# Patient Record
Sex: MALE | Race: WHITE | NOT HISPANIC OR LATINO | Employment: OTHER | URBAN - METROPOLITAN AREA
[De-identification: names, ages, dates, MRNs, and addresses within clinical notes are randomized per-mention and may not be internally consistent; named-entity substitution may affect disease eponyms.]

---

## 2017-01-24 DIAGNOSIS — Z79.01 LONG TERM (CURRENT) USE OF ANTICOAGULANTS: Primary | ICD-10-CM

## 2017-01-24 RX ORDER — WARFARIN SODIUM 2.5 MG/1
TABLET ORAL
Qty: 180 TABLET | Refills: 1 | Status: SHIPPED | OUTPATIENT
Start: 2017-01-24 | End: 2017-04-06

## 2017-01-24 NOTE — TELEPHONE ENCOUNTER
Anticoagulation Monitoring INR Value INR Value INR Goal Range Therapeutic?   Latest Ref Rng 2.0 - 3.0 0.86 - 1.14     12/29/2016 3.2 (A)  2.0-3.0 SUPRA     Anticoagulation Monitoring Last Week Total Next Week Total Weekly Dose   Latest Ref Rng      12/29/2016 25 mg 25 mg      Anticoagulation Monitoring Return Date Recheck   Latest Ref Rng     12/29/2016 1/26/2017 4 WEEKS     Anticoagulation Monitoring Instructions Instructions   Latest Ref Rng     12/29/2016 5 mg on Mon, Wed, Fri; 2.5 mg all other days      Anticoagulation Monitoring INR location Visit Report   Latest Ref Rng     12/29/2016 Clinic

## 2017-01-24 NOTE — TELEPHONE ENCOUNTER
warfarin (COUMADIN) 2.5 MG tablet    Last Written Prescription Date: 1/11/16  Last Fill Qty: 180, # refills: 3  Last Office Visit with G, P or [facility] prescribing provider: 4/6/16       Date and Result of Last PT/INR:   INR      3.2   12/29/2016  INR      2.1   11/17/2016  INR     2.30   12/28/2015  INR     1.90   10/16/2014

## 2017-01-26 ENCOUNTER — ANTICOAGULATION THERAPY VISIT (OUTPATIENT)
Dept: NURSING | Facility: CLINIC | Age: 82
End: 2017-01-26
Payer: COMMERCIAL

## 2017-01-26 DIAGNOSIS — Z79.01 LONG TERM CURRENT USE OF ANTICOAGULANT THERAPY: Primary | ICD-10-CM

## 2017-01-26 DIAGNOSIS — I48.91 ATRIAL FIBRILLATION, UNSPECIFIED TYPE (H): ICD-10-CM

## 2017-01-26 LAB — INR POINT OF CARE: 2.4 (ref 2–3)

## 2017-01-26 PROCEDURE — 36416 COLLJ CAPILLARY BLOOD SPEC: CPT

## 2017-01-26 PROCEDURE — 85610 PROTHROMBIN TIME: CPT | Mod: QW

## 2017-01-26 PROCEDURE — 99207 ZZC NO CHARGE NURSE ONLY: CPT

## 2017-01-26 NOTE — MR AVS SNAPSHOT
Israel Moyer   1/26/2017 9:15 AM   Anticoagulation Therapy Visit    Description:  81 year old male   Provider:  NE ANTI EDDY   Department:  Ne Nurse           INR as of 1/26/2017     Selected INR 2.4 (1/26/2017)      Anticoagulation Summary as of 1/26/2017     INR goal 2.0-3.0   Selected INR 2.4 (1/26/2017)   Full instructions 5 mg on Mon, Wed, Fri; 2.5 mg all other days   Next INR check 3/9/2017    Indications   Long term current use of anticoagulant therapy [Z79.01]  ATRIAL FIBRILLATION [I48.91]         Your next Anticoagulation Clinic appointment(s)     Mar 09, 2017  9:15 AM   Anticoagulation Visit with NE ANTI COAG   [facility] ([facility])    [address] [place] [id number] [phone]              Contact Numbers     Please call [phone] to cancel and/or reschedule your appointment.  Please call [phone] with any problems or questions regarding your therapy          January 2017 Details    Sun Mon Tue Wed Thu Fri Sat     1               2               3               4               5               6               7                 8               9               10               11               12               13               14                 15               16               17               18               19               20               21                 22               23               24               25               26      2.5 mg   See details      27      5 mg         28      2.5 mg           29      2.5 mg         30      5 mg         31      2.5 mg              Date Details   01/26 This INR check               How to take your warfarin dose     To take:  2.5 mg Take 1 of the 2.5 mg tablets.    To take:  5 mg Take 2 of the 2.5 mg tablets.           February 2017 Details    Sun Mon Tue Wed u Fri Sat        1      5 mg         2      2.5 mg         3      5 mg         4      2.5 mg           5      2.5 mg         6       5 mg         7      2.5 mg         8      5 mg         9      2.5 mg         10      5 mg         11      2.5 mg           12      2.5 mg         13      5 mg         14      2.5 mg         15      5 mg         16      2.5 mg         17      5 mg         18      2.5 mg           19      2.5 mg         20      5 mg         21      2.5 mg         22      5 mg         23      2.5 mg         24      5 mg         25      2.5 mg           26      2.5 mg         27      5 mg         28      2.5 mg              Date Details   No additional details            How to take your warfarin dose     To take:  2.5 mg Take 1 of the 2.5 mg tablets.    To take:  5 mg Take 2 of the 2.5 mg tablets.           March 2017 Details    Sun Mon Tue Wed Thu Fri Sat        1      5 mg         2      2.5 mg         3      5 mg         4      2.5 mg           5      2.5 mg         6      5 mg         7      2.5 mg         8      5 mg         9            10               11                 12               13               14               15               16               17               18                 19               20               21               22               23               24               25                 26               27               28               29               30               31                 Date Details   No additional details    Date of next INR:  3/9/2017         How to take your warfarin dose     To take:  2.5 mg Take 1 of the 2.5 mg tablets.    To take:  5 mg Take 2 of the 2.5 mg tablets.

## 2017-01-26 NOTE — PROGRESS NOTES
ANTICOAGULATION FOLLOW-UP CLINIC VISIT    Patient Name:  Israel Moyer  Date:  1/26/2017  Contact Type:  Face to Face    SUBJECTIVE:     Patient Findings     Positives No Problem Findings           OBJECTIVE    INR PROTIME   Date Value Ref Range Status   01/26/2017 2.4 2.0 - 3.0 Final       ASSESSMENT / PLAN  INR assessment THER    Recheck INR In: 6 WEEKS    INR Location Clinic      Anticoagulation Summary as of 1/26/2017     INR goal 2.0-3.0   Selected INR 2.4 (1/26/2017)   Maintenance plan 5 mg (2.5 mg x 2) on Mon, Wed, Fri; 2.5 mg (2.5 mg x 1) all other days   Full instructions 5 mg on Mon, Wed, Fri; 2.5 mg all other days   Weekly total 25 mg   No change documented Sarah Velasquez, SIXTO   Plan last modified Jamaica Christensen RN (8/6/2015)   Next INR check 3/9/2017   Target end date Indefinite    Indications   Long term current use of anticoagulant therapy [Z79.01]  ATRIAL FIBRILLATION [I48.91]         Anticoagulation Episode Summary     INR check location [facility]    Preferred lab     Send INR reminders to [facility]    Comments             See the Encounter Report to view Anticoagulation Flowsheet and Dosing Calendar (Go to Encounters tab in chart review, and find the Anticoagulation Therapy Visit)        Sarah Velasquez, RN

## 2017-03-09 ENCOUNTER — ANTICOAGULATION THERAPY VISIT (OUTPATIENT)
Dept: NURSING | Facility: CLINIC | Age: 82
End: 2017-03-09
Payer: COMMERCIAL

## 2017-03-09 DIAGNOSIS — I48.91 ATRIAL FIBRILLATION, UNSPECIFIED TYPE (H): ICD-10-CM

## 2017-03-09 DIAGNOSIS — Z79.01 LONG TERM CURRENT USE OF ANTICOAGULANT THERAPY: ICD-10-CM

## 2017-03-09 LAB — INR POINT OF CARE: 1.9 (ref 2–3)

## 2017-03-09 PROCEDURE — 85610 PROTHROMBIN TIME: CPT | Mod: QW

## 2017-03-09 PROCEDURE — 99207 ZZC NO CHARGE NURSE ONLY: CPT

## 2017-03-09 PROCEDURE — 36416 COLLJ CAPILLARY BLOOD SPEC: CPT

## 2017-03-09 NOTE — PROGRESS NOTES
ANTICOAGULATION FOLLOW-UP CLINIC VISIT    Patient Name:  Israel Moyer  Date:  3/9/2017  Contact Type:  Face to Face    SUBJECTIVE:     Patient Findings     Positives No Problem Findings           OBJECTIVE    INR Protime   Date Value Ref Range Status   03/09/2017 1.9 (A) 2.0 - 3.0 Final       ASSESSMENT / PLAN  INR assessment THER    Recheck INR In: 4 WEEKS    INR Location Clinic      Anticoagulation Summary as of 3/9/2017     INR goal 2.0-3.0   Today's INR 1.9!   Maintenance plan 5 mg (2.5 mg x 2) on Mon, Wed, Fri; 2.5 mg (2.5 mg x 1) all other days   Full instructions 3/9: 5 mg; Otherwise 5 mg on Mon, Wed, Fri; 2.5 mg all other days   Weekly total 25 mg   Plan last modified Jamaica Christensen RN (8/6/2015)   Next INR check 4/6/2017   Target end date Indefinite    Indications   Long term current use of anticoagulant therapy [Z79.01]  ATRIAL FIBRILLATION [I48.91]         Anticoagulation Episode Summary     INR check location [facility]    Preferred lab     Send INR reminders to [facility]    Comments             See the Encounter Report to view Anticoagulation Flowsheet and Dosing Calendar (Go to Encounters tab in chart review, and find the Anticoagulation Therapy Visit)    Dosage adjustment made based on physician directed care plan.    Sarah Velasquez RN

## 2017-03-09 NOTE — MR AVS SNAPSHOT
Israel Moyer   3/9/2017 9:15 AM   Anticoagulation Therapy Visit    Description:  81 year old male   Provider:  NE ANTI COAG   Department:  Ne Nurse           INR as of 3/9/2017     Today's INR 1.9!      Anticoagulation Summary as of 3/9/2017     INR goal 2.0-3.0   Today's INR 1.9!   Full instructions 3/9: 5 mg; Otherwise 5 mg on Mon, Wed, Fri; 2.5 mg all other days   Next INR check 4/6/2017    Indications   Long term current use of anticoagulant therapy [Z79.01]  ATRIAL FIBRILLATION [I48.91]         Your next Anticoagulation Clinic appointment(s)     Apr 06, 2017  9:15 AM CDT   [facility]    [address] [place] [id number] [phone]              Contact Numbers     Please call [phone] to cancel and/or reschedule your appointment.  Please call [phone] with any problems or questions regarding your therapy          March 2017 Details    Sun Mon Tue Wed Thu Fri Sat        1               2               3               4                 5               6               7               8               9      5 mg   See details      10      5 mg         11      2.5 mg           12      2.5 mg         13      5 mg         14      2.5 mg         15      5 mg         16      2.5 mg         17      5 mg         18      2.5 mg           19      2.5 mg         20      5 mg         21      2.5 mg         22      5 mg         23      2.5 mg         24      5 mg         25      2.5 mg           26      2.5 mg         27      5 mg         28      2.5 mg         29      5 mg         30      2.5 mg         31      5 mg           Date Details   03/09 This INR check               How to take your warfarin dose     To take:  2.5 mg Take 1 of the 2.5 mg tablets.    To take:  5 mg Take 2 of the 2.5 mg tablets.           April 2017 Details    Sun Mon Tue Wed Thu Fri Sat           1      2.5 mg           2       2.5 mg         3      5 mg         4      2.5 mg         5      5 mg         6            7               8                 9               10               11               12               13               14               15                 16               17               18               19               20               21               22                 23               24               25               26               27               28               29                 30                      Date Details   No additional details    Date of next INR:  4/6/2017         How to take your warfarin dose     To take:  2.5 mg Take 1 of the 2.5 mg tablets.    To take:  5 mg Take 2 of the 2.5 mg tablets.

## 2017-04-06 ENCOUNTER — ANTICOAGULATION THERAPY VISIT (OUTPATIENT)
Dept: NURSING | Facility: CLINIC | Age: 82
End: 2017-04-06
Payer: COMMERCIAL

## 2017-04-06 ENCOUNTER — OFFICE VISIT (OUTPATIENT)
Dept: FAMILY MEDICINE | Facility: CLINIC | Age: 82
End: 2017-04-06
Payer: COMMERCIAL

## 2017-04-06 VITALS
SYSTOLIC BLOOD PRESSURE: 128 MMHG | HEIGHT: 69 IN | TEMPERATURE: 97.9 F | DIASTOLIC BLOOD PRESSURE: 72 MMHG | BODY MASS INDEX: 35.55 KG/M2 | WEIGHT: 240 LBS | HEART RATE: 72 BPM

## 2017-04-06 DIAGNOSIS — Z23 NEED FOR DIPHTHERIA-TETANUS-PERTUSSIS (TDAP) VACCINE: ICD-10-CM

## 2017-04-06 DIAGNOSIS — M1A.0790 IDIOPATHIC CHRONIC GOUT OF FOOT WITHOUT TOPHUS, UNSPECIFIED LATERALITY: ICD-10-CM

## 2017-04-06 DIAGNOSIS — N18.30 CKD (CHRONIC KIDNEY DISEASE) STAGE 3, GFR 30-59 ML/MIN (H): ICD-10-CM

## 2017-04-06 DIAGNOSIS — I50.42 CHRONIC COMBINED SYSTOLIC AND DIASTOLIC CONGESTIVE HEART FAILURE (H): ICD-10-CM

## 2017-04-06 DIAGNOSIS — E78.5 HYPERLIPIDEMIA LDL GOAL <100: ICD-10-CM

## 2017-04-06 DIAGNOSIS — Z79.01 LONG TERM (CURRENT) USE OF ANTICOAGULANTS: ICD-10-CM

## 2017-04-06 DIAGNOSIS — I48.20 CHRONIC ATRIAL FIBRILLATION (H): Primary | ICD-10-CM

## 2017-04-06 DIAGNOSIS — Z79.01 LONG TERM CURRENT USE OF ANTICOAGULANT THERAPY: ICD-10-CM

## 2017-04-06 DIAGNOSIS — I48.91 ATRIAL FIBRILLATION, UNSPECIFIED TYPE (H): ICD-10-CM

## 2017-04-06 DIAGNOSIS — M1A.09X1 IDIOPATHIC CHRONIC GOUT OF MULTIPLE SITES WITH TOPHUS: ICD-10-CM

## 2017-04-06 DIAGNOSIS — E03.4 HYPOTHYROIDISM DUE TO ACQUIRED ATROPHY OF THYROID: ICD-10-CM

## 2017-04-06 DIAGNOSIS — I10 HYPERTENSION GOAL BP (BLOOD PRESSURE) < 140/90: ICD-10-CM

## 2017-04-06 LAB
ERYTHROCYTE [DISTWIDTH] IN BLOOD BY AUTOMATED COUNT: 13.9 % (ref 10–15)
HCT VFR BLD AUTO: 38.3 % (ref 40–53)
HGB BLD-MCNC: 12.4 G/DL (ref 13.3–17.7)
INR POINT OF CARE: 4.1 (ref 0.86–1.14)
MCH RBC QN AUTO: 31.6 PG (ref 26.5–33)
MCHC RBC AUTO-ENTMCNC: 32.4 G/DL (ref 31.5–36.5)
MCV RBC AUTO: 98 FL (ref 78–100)
PLATELET # BLD AUTO: 184 10E9/L (ref 150–450)
RBC # BLD AUTO: 3.93 10E12/L (ref 4.4–5.9)
WBC # BLD AUTO: 5.5 10E9/L (ref 4–11)

## 2017-04-06 PROCEDURE — 99207 ZZC NO CHARGE NURSE ONLY: CPT

## 2017-04-06 PROCEDURE — 80061 LIPID PANEL: CPT | Performed by: FAMILY MEDICINE

## 2017-04-06 PROCEDURE — 82043 UR ALBUMIN QUANTITATIVE: CPT | Performed by: FAMILY MEDICINE

## 2017-04-06 PROCEDURE — 99214 OFFICE O/P EST MOD 30 MIN: CPT | Mod: 25 | Performed by: FAMILY MEDICINE

## 2017-04-06 PROCEDURE — 80048 BASIC METABOLIC PNL TOTAL CA: CPT | Performed by: FAMILY MEDICINE

## 2017-04-06 PROCEDURE — 85610 PROTHROMBIN TIME: CPT | Mod: QW

## 2017-04-06 PROCEDURE — 90471 IMMUNIZATION ADMIN: CPT | Performed by: FAMILY MEDICINE

## 2017-04-06 PROCEDURE — 84460 ALANINE AMINO (ALT) (SGPT): CPT | Performed by: FAMILY MEDICINE

## 2017-04-06 PROCEDURE — 90715 TDAP VACCINE 7 YRS/> IM: CPT | Performed by: FAMILY MEDICINE

## 2017-04-06 PROCEDURE — 84443 ASSAY THYROID STIM HORMONE: CPT | Performed by: FAMILY MEDICINE

## 2017-04-06 PROCEDURE — 85027 COMPLETE CBC AUTOMATED: CPT | Performed by: FAMILY MEDICINE

## 2017-04-06 PROCEDURE — 36416 COLLJ CAPILLARY BLOOD SPEC: CPT

## 2017-04-06 RX ORDER — FUROSEMIDE 40 MG
40 TABLET ORAL DAILY
Qty: 90 TABLET | Refills: 3 | Status: SHIPPED | OUTPATIENT
Start: 2017-04-06 | End: 2018-04-16

## 2017-04-06 RX ORDER — PRAVASTATIN SODIUM 80 MG/1
80 TABLET ORAL DAILY
Qty: 90 TABLET | Refills: 3 | Status: SHIPPED | OUTPATIENT
Start: 2017-04-06 | End: 2018-04-28

## 2017-04-06 RX ORDER — INDOMETHACIN 25 MG/1
25 CAPSULE ORAL 2 TIMES DAILY WITH MEALS
Qty: 42 CAPSULE | Refills: 1 | Status: SHIPPED | OUTPATIENT
Start: 2017-04-06 | End: 2018-05-03

## 2017-04-06 RX ORDER — METOPROLOL SUCCINATE 100 MG/1
100 TABLET, EXTENDED RELEASE ORAL DAILY
Qty: 90 TABLET | Refills: 3 | Status: SHIPPED | OUTPATIENT
Start: 2017-04-06 | End: 2018-04-10

## 2017-04-06 RX ORDER — WARFARIN SODIUM 2.5 MG/1
TABLET ORAL
Qty: 180 TABLET | Refills: 1 | Status: SHIPPED | OUTPATIENT
Start: 2017-04-06 | End: 2018-01-25

## 2017-04-06 RX ORDER — LEVOTHYROXINE SODIUM 50 UG/1
50 TABLET ORAL DAILY
Qty: 90 TABLET | Refills: 3 | Status: SHIPPED | OUTPATIENT
Start: 2017-04-06 | End: 2018-05-03

## 2017-04-06 RX ORDER — ALLOPURINOL 300 MG/1
300 TABLET ORAL DAILY
Qty: 90 TABLET | Refills: 3 | Status: SHIPPED | OUTPATIENT
Start: 2017-04-06 | End: 2018-04-10

## 2017-04-06 RX ORDER — LISINOPRIL 10 MG/1
10 TABLET ORAL DAILY
Qty: 90 TABLET | Refills: 3 | Status: SHIPPED | OUTPATIENT
Start: 2017-04-06 | End: 2018-04-28

## 2017-04-06 NOTE — NURSING NOTE
Prior to injection verified patient identity using patient's name and date of birth.    Screening Questionnaire for Adult Immunization    Are you sick today?   No   Do you have allergies to medications, food, a vaccine component or latex?   No   Have you ever had a serious reaction after receiving a vaccination?   No   Do you have a long-term health problem with heart disease, lung disease, asthma, kidney disease, metabolic disease (e.g. diabetes), anemia, or other blood disorder?   No   Do you have cancer, leukemia, HIV/AIDS, or any other immune system problem?   No   In the past 3 months, have you taken medications that affect  your immune system, such as prednisone, other steroids, or anticancer drugs; drugs for the treatment of rheumatoid arthritis, Crohn s disease, or psoriasis; or have you had radiation treatments?   No   Have you had a seizure, or a brain or other nervous system problem?   No   During the past year, have you received a transfusion of blood or blood     products, or been given immune (gamma) globulin or antiviral drug?   No   For women: Are you pregnant or is there a chance you could become        pregnant during the next month?   No   Have you received any vaccinations in the past 4 weeks?   No     Immunization questionnaire answers were all negative.      MNVFC doesn't apply on this patient    Per orders of Dr. Shukla, injection of Tdap given by Ann Alas. Patient instructed to remain in clinic for 20 minutes afterwards, and to report any adverse reaction to me immediately.       Screening performed by Ann Alas on 4/6/2017 at 12:17 PM.

## 2017-04-06 NOTE — MR AVS SNAPSHOT
Israel Moyer   4/6/2017 11:15 AM   Anticoagulation Therapy Visit    Description:  81 year old male   Provider:  WESLY KAM   Department:  Ne Nurse           INR as of 4/6/2017     Today's INR 4.1!      Anticoagulation Summary as of 4/6/2017     INR goal 2.0-3.0   Today's INR 4.1!   Full instructions 4/7: Hold; Otherwise 5 mg on Mon, Wed; 2.5 mg all other days   Next INR check 4/20/2017    Indications   Long term current use of anticoagulant therapy [Z79.01]  ATRIAL FIBRILLATION [I48.91]         Your next Anticoagulation Clinic appointment(s)     Apr 20, 2017  8:30 AM CDT   Anticoagulation Visit with NE ANTI COAG   [facility]    [address] [place] [id number] [phone]              Contact Numbers     Please call [phone] to cancel and/or reschedule your appointment.  Please call [phone] with any problems or questions regarding your therapy          April 2017 Details    Sun Mon Tue Wed Thu Fri Sat           1                 2               3               4               5               6      2.5 mg   See details      7      Hold         8      2.5 mg           9      2.5 mg         10      5 mg         11      2.5 mg         12      5 mg         13      2.5 mg         14      2.5 mg         15      2.5 mg           16      2.5 mg         17      5 mg         18      2.5 mg         19      5 mg         20            21               22                 23               24               25               26               27               28               29                 30                      Date Details   04/06 This INR check       Date of next INR:  4/20/2017         How to take your warfarin dose     To take:  2.5 mg Take 1 of the 2.5 mg tablets.    To take:  5 mg Take 2 of the 2.5 mg tablets.    Hold Do not take your warfarin dose. See the Details table to the right for additional instructions.

## 2017-04-06 NOTE — PROGRESS NOTES
"  SUBJECTIVE:                                                    Israel Moyer is a 81 year old male who presents to clinic today for the following health issues:    CHF. Patient was diagnosed with CHF at the time of diagnosis of his AFib. He hasn't had any issues with this since then. Denies sudden weight gain or swollen feet.  He does report that he has been hearing \"gurgling\" in his epigastrium when he is on his side the last couple of nights. No dyspnea on exertion.     Labs. Reviewed blood workup done on 4/6/2016. TSH levels noted to be mildly high (4.79), as well as creatine levels (1.29).     Past/recent records reviewed and discussed for -- medications and immunizations.      Hyperlipidemia Follow-Up      Rate your low fat/cholesterol diet?: not monitoring fat    Taking statin?  Yes, no muscle aches from statin    Other lipid medications/supplements?:  none     Hypertension Follow-up      Outpatient blood pressures are not being checked.    Low Salt Diet: not monitoring salt       Amount of exercise or physical activity: Everyday- walking his dog    Problems taking medications regularly: No    Medication side effects: none    Diet: regular (no restrictions)      Problem list and histories reviewed & adjusted, as indicated.  Additional history: as documented    Labs reviewed in EPIC    Reviewed and updated as needed this visit by clinical staff       Reviewed and updated as needed this visit by Provider         ROS:  Constitutional, HEENT, cardiovascular, pulmonary, GI, , musculoskeletal, neuro, skin, endocrine and psych systems are negative, except as otherwise noted.    This document serves as a record of the services and decisions personally performed and made by Zechariah Shukla MD. It was created on their behalf by Titus Pritchett, a trained medical scribe. The creation of this document is based the provider's statements to the medical scribe.  Titus Pritchett April 6, 2017 11:52 AM         OBJECTIVE:            " "                                        /72 (BP Location: Right arm, Cuff Size: Adult Large)  Pulse 72  Temp 97.9  F (36.6  C) (Oral)  Ht 1.759 m (5' 9.25\")  Wt 108.9 kg (240 lb)  BMI 35.19 kg/m2  Body mass index is 35.19 kg/(m^2).  GENERAL: healthy, alert and no distress  HENT: ear canals and TM's normal, nose and mouth without ulcers or lesions  NECK: no adenopathy, no asymmetry, masses, or scars and thyroid normal to palpation  RESP: lungs clear to auscultation - no rales, rhonchi or wheezes  CV: regular rate and rhythm, normal S1 S2, no S3 or S4, no murmur, click or rub, no peripheral edema   ABDOMEN: soft, nontender, no hepatosplenomegaly, no masses and bowel sounds normal  SKIN: no suspicious lesions or rashes  PSYCH: mentation appears normal, affect normal/bright    Diagnostic Test Results:  Results for orders placed or performed in visit on 04/06/17 (from the past 24 hour(s))   INR point of care   Result Value Ref Range    INR Protime 4.1 (A) 0.86 - 1.14        ASSESSMENT/PLAN:                                                    (I48.2) Chronic atrial fibrillation (H)  (primary encounter diagnosis)  Comment:stable. INR was 4.1 today.   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 CHF diagnosed at the time his AFib was diagnosed. \"gurgling\" discomfort probably secondary to seasonal allergies and not pulmonary edema.   Plan: HEART FAILURE ACTION PLAN ORDER, ALT        Monitor for acute weight changes or dependent edema    (I10) Hypertension goal BP (blood pressure) < 140/90  Comment: BP was 128/72. Controlled. Tolerating medications well.   Plan: metoprolol (TOPROL XL) 100 MG 24 hr tablet,         furosemide (LASIX) 40 MG tablet, Basic         metabolic panel        Medications refilled, " continue present medications.     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  Comment: controlled  Plan: allopurinol (ZYLOPRIM) 300 MG tablet, Basic         metabolic panel        Medications refilled, continue present medications    (N18.3) CKD (chronic kidney disease) stage 3, GFR 30-59 ml/min  Comment: stable  Plan: lisinopril (PRINIVIL/ZESTRIL) 10 MG tablet,         Albumin Random Urine Quantitative, CBC with         platelets        Medications refilled, continue present medications        Follow up, pending results    (E78.5) Hyperlipidemia LDL goal <100  Comment: stable  Plan: pravastatin (PRAVACHOL) 80 MG tablet, Lipid         Profile with reflex to direct LDL        Medications refilled, continue present medications        Follow up, pending results    (M1A.0790) Idiopathic chronic gout of foot without tophus, unspecified laterality  Comment: patient using indomethacin occasionally.   Plan: indomethacin (INDOCIN) 25 MG capsule        Medications refilled, continue present medications    (Z79.01) Long term (current) use of anticoagulants, Chronic  Comment: INR was 4.1  Plan: warfarin (COUMADIN) 2.5 MG tablet        Continue present medications, medications refilled    (Z23) Need for diphtheria-tetanus-pertussis (Tdap) vaccine  Comment:   Plan: TDAP VACCINE (ADACEL)            Patient Instructions   -Continue present medications         Ry Shukla MD, MD  [facility]

## 2017-04-06 NOTE — LETTER
My Heart Failure Action Plan   Name: Israel Moyer    YOB: 1935   Date: 4/6/2017    My doctor: Ry Shukla     [address] [address] [phone]  My Diagnosis: Combined Heart Failure   My Ejection Fraction: Over 50%    My Exercise Goal: 30 minutes daily  .     My Weight Goal: 240    Wt Readings from Last 2 Encounters:   04/06/17 240 lb (108.9 kg)   04/06/16 239 lb 2 oz (108.5 kg)     Weigh yourself daily using the same scale. If you gain more than 2 pounds in 24 hours or 5 pounds in a week call the clinic    My Diet Goal: No added salt    Emergency Room Visits:    Our goal is to improve your quality of life and help you avoid a visit to the emergency room or hospital.  If we work together, we can achieve this goal. But, if you feel you need to call 911 or go to the emergency room, please do so.  If you go to the emergency room, please bring your list of medicines and your daily weight chart with you.       GREEN ZONE     Doing well today    Weight gained is no more than 2 pounds a day or 5 pounds a week.    No swelling in feet, ankles, legs or stomach.    No more swelling than usual.    No more trouble breathing than usual.    No change in my sleep.    No other problems. Actions:    I am doing fine.  I will take my medicine, follow my diet, see my doctor, exercise, and watch for symptoms.           YELLOW ZONE         Having a bad day or flare up    Weight gain of more than 2 pounds in one day or 5 pounds in one week.    New swelling in ankle, leg, knee or thigh.    Bloating in belly, pants feel tighter.    Swelling in hands or face.    Coughing or trouble breathing while walking or talking.    Harder to breathe last night.    Have trouble sleeping, wake up short of breath.    Much more tired than usual.    Not eating.    Pain in my chest or bad leg cramps.    Feel weak or dizzy. Actions:    I need to take action and call my  doctor or nurse today.                 RED ZONE         Need medical care now    Weight gain of 5 pounds overnight.    Chest pain or pressure that does not go away.    Feel less alert.    Wheezing or have trouble breathing when at rest.    Cannot sleep lying down.    Cannot take my water pill.    Pass out or faint. Actions:    I need to call my doctor or nurse now!    Call 911 if I have chest pain or cannot breathe.        Electronically signed by: Ry Shukla MD, April 6, 2017

## 2017-04-06 NOTE — MR AVS SNAPSHOT
After Visit Summary   4/6/2017    Israel Moyer    MRN: [id number]           Patient Information     Date Of Birth          1935        Visit Information        Provider Department      4/6/2017 12:00 PM Ry Shukla MD [facility]        Today's Diagnoses     Chronic atrial fibrillation (H)    -  1    Chronic combined systolic and diastolic congestive heart failure (H)        Hypertension goal BP (blood pressure) < 140/90        Hypothyroidism due to acquired atrophy of thyroid        Idiopathic chronic gout of multiple sites with tophus        CKD (chronic kidney disease) stage 3, GFR 30-59 ml/min        Hyperlipidemia LDL goal <100        Idiopathic chronic gout of foot without tophus, unspecified laterality        Long term (current) use of anticoagulants        Need for diphtheria-tetanus-pertussis (Tdap) vaccine          Care Instructions    -Continue present medications     -Monitor for weight changes or swollen feet         Follow-ups after your visit        Follow-up notes from your care team     Return in about 6 months (around 10/6/2017).      Your next 10 appointments already scheduled     Apr 20, 2017  8:30 AM CDT   Anticoagulation Visit with NE ANTI COAG   [facility]    [address] [place] [id number] [phone]              Who to contact     If you have questions or need follow up information about today's clinic visit or your schedule please contact [facility] directly at [phone].  Normal or non-critical lab and imaging results will be communicated to you by MyChart, letter or phone within 4 business days after the clinic has received the results. If you do not hear from us within 7 days, please contact the clinic through MyChart or phone. If you have a critical or abnormal lab result, we will notify you by phone as soon as possible.  Submit refill  "requests through [employer] or call your pharmacy and they will forward the refill request to us. Please allow 3 business days for your refill to be completed.          Additional Information About Your Visit        Health Benefits [employer] Information     [employer] gives you secure access to your electronic health record. If you see a primary care provider, you can also send messages to your care team and make appointments. If you have questions, please call your primary care clinic.  If you do not have a primary care provider, please call [phone] and they will assist you.        Care EveryWhere ID     This is your Care EveryWhere ID. This could be used by other organizations to access your [facility] medical records  [id number]        Your Vitals Were     Pulse Temperature Height BMI (Body Mass Index)          72 97.9  F (36.6  C) (Oral) 5' 9.25\" (1.759 m) 35.19 kg/m2         Blood Pressure from Last 3 Encounters:   04/06/17 128/72   04/06/16 110/62   12/28/15 102/60    Weight from Last 3 Encounters:   04/06/17 240 lb (108.9 kg)   04/06/16 239 lb 2 oz (108.5 kg)   04/29/15 231 lb (104.8 kg)              We Performed the Following     Albumin Random Urine Quantitative     ALT     Basic metabolic panel     CBC with platelets     HEART FAILURE ACTION PLAN ORDER     Lipid Profile with reflex to direct LDL     TDAP VACCINE (ADACEL)     TSH with free T4 reflex          Where to get your medicines      These medications were sent to [facility] [place] - [address] [facility]  [address] [facility] [place] [address]     Phone:  [phone]     allopurinol 300 MG tablet    furosemide 40 MG tablet    indomethacin 25 MG capsule    levothyroxine 50 MCG tablet    lisinopril 10 MG tablet    metoprolol 100 MG 24 hr tablet    pravastatin 80 MG tablet    warfarin 2.5 MG tablet          Primary Care Provider Office Phone # Fax #    yR Shukla [phone]       [place] [address] " [place] [facility] [address]        Thank you!     Thank you for choosing [facility]  for your care. Our goal is always to provide you with excellent care. Hearing back from our patients is one way we can continue to improve our services. Please take a few minutes to complete the written survey that you may receive in the mail after your visit with us. Thank you!             Your Updated Medication List - Protect others around you: Learn how to safely use, store and throw away your medicines at www.disposemymeds.org.          This list is accurate as of: 4/6/17 12:12 PM.  Always use your most recent med list.                   Brand Name Dispense Instructions for use    allopurinol 300 MG tablet    ZYLOPRIM    90 tablet    Take 1 tablet (300 mg) by mouth daily       furosemide 40 MG tablet    LASIX    90 tablet    Take 1 tablet (40 mg) by mouth daily       indomethacin 25 MG capsule    INDOCIN    42 capsule    Take 1 capsule (25 mg) by mouth 2 times daily (with meals) Use sparingly for gout pain- 1-2 days       levothyroxine 50 MCG tablet    SYNTHROID/LEVOTHROID    90 tablet    Take 1 tablet (50 mcg) by mouth daily Overdue for office visit and labs       lisinopril 10 MG tablet    PRINIVIL/ZESTRIL    90 tablet    Take 1 tablet (10 mg) by mouth daily       metoprolol 100 MG 24 hr tablet    TOPROL XL    90 tablet    Take 1 tablet (100 mg) by mouth daily       pravastatin 80 MG tablet    PRAVACHOL    90 tablet    Take 1 tablet (80 mg) by mouth daily       warfarin 2.5 MG tablet    COUMADIN    180 tablet    Take 2 tablets (5mg) by mouth MWF & 1 tablet (2.5mg) all other days

## 2017-04-06 NOTE — NURSING NOTE
"Chief Complaint   Patient presents with     Chronic Disease Management       Initial /72 (BP Location: Right arm, Cuff Size: Adult Large)  Pulse 72  Temp 97.9  F (36.6  C) (Oral)  Ht 5' 9.25\" (1.759 m)  Wt 240 lb (108.9 kg)  BMI 35.19 kg/m2 Estimated body mass index is 35.19 kg/(m^2) as calculated from the following:    Height as of this encounter: 5' 9.25\" (1.759 m).    Weight as of this encounter: 240 lb (108.9 kg).  Medication Reconciliation: complete     Ann Alas MA     "

## 2017-04-06 NOTE — PROGRESS NOTES
ANTICOAGULATION FOLLOW-UP CLINIC VISIT    Patient Name:  Israel Moyer  Date:  4/6/2017  Contact Type:  Face to Face    SUBJECTIVE:     Patient Findings     Positives Unexplained INR or factor level change (hasn't been eating green veg. Has a drink every evening and 8 beers once a week.)           OBJECTIVE    INR Protime   Date Value Ref Range Status   04/06/2017 4.1 (A) 0.86 - 1.14 Final       ASSESSMENT / PLAN  INR assessment SUPRA    Recheck INR In: 2 WEEKS    INR Location Clinic      Anticoagulation Summary as of 4/6/2017     INR goal 2.0-3.0   Today's INR 4.1!   Maintenance plan 5 mg (2.5 mg x 2) on Mon, Wed; 2.5 mg (2.5 mg x 1) all other days   Full instructions 4/7: Hold; Otherwise 5 mg on Mon, Wed; 2.5 mg all other days   Weekly total 22.5 mg   Plan last modified Jamaica Christensen RN (4/6/2017)   Next INR check 4/20/2017   Target end date Indefinite    Indications   Long term current use of anticoagulant therapy [Z79.01]  ATRIAL FIBRILLATION [I48.91]         Anticoagulation Episode Summary     [facility]    Preferred lab     [facility]    Comments             See the Encounter Report to view Anticoagulation Flowsheet and Dosing Calendar (Go to Encounters tab in chart review, and find the Anticoagulation Therapy Visit)    Dosage adjustment made based on physician directed care plan.    Jamaica Christensen, RN

## 2017-04-07 LAB
ALT SERPL W P-5'-P-CCNC: 21 U/L (ref 0–70)
ANION GAP SERPL CALCULATED.3IONS-SCNC: 7 MMOL/L (ref 3–14)
BUN SERPL-MCNC: 24 MG/DL (ref 7–30)
CALCIUM SERPL-MCNC: 8.6 MG/DL (ref 8.5–10.1)
CHLORIDE SERPL-SCNC: 107 MMOL/L (ref 94–109)
CHOLEST SERPL-MCNC: 142 MG/DL
CO2 SERPL-SCNC: 28 MMOL/L (ref 20–32)
CREAT SERPL-MCNC: 1.12 MG/DL (ref 0.66–1.25)
CREAT UR-MCNC: 42 MG/DL
GFR SERPL CREATININE-BSD FRML MDRD: 63 ML/MIN/1.7M2
GLUCOSE SERPL-MCNC: 101 MG/DL (ref 70–99)
HDLC SERPL-MCNC: 36 MG/DL
LDLC SERPL CALC-MCNC: 70 MG/DL
MICROALBUMIN UR-MCNC: <5 MG/L
MICROALBUMIN/CREAT UR: NORMAL MG/G CR (ref 0–17)
NONHDLC SERPL-MCNC: 106 MG/DL
POTASSIUM SERPL-SCNC: 3.8 MMOL/L (ref 3.4–5.3)
SODIUM SERPL-SCNC: 142 MMOL/L (ref 133–144)
TRIGL SERPL-MCNC: 179 MG/DL
TSH SERPL DL<=0.005 MIU/L-ACNC: 3.01 MU/L (ref 0.4–4)

## 2017-04-20 ENCOUNTER — ANTICOAGULATION THERAPY VISIT (OUTPATIENT)
Dept: NURSING | Facility: CLINIC | Age: 82
End: 2017-04-20
Payer: COMMERCIAL

## 2017-04-20 DIAGNOSIS — Z79.01 LONG TERM CURRENT USE OF ANTICOAGULANT THERAPY: ICD-10-CM

## 2017-04-20 DIAGNOSIS — I48.91 ATRIAL FIBRILLATION, UNSPECIFIED TYPE (H): ICD-10-CM

## 2017-04-20 LAB — INR POINT OF CARE: 3.8 (ref 0.86–1.14)

## 2017-04-20 PROCEDURE — 36416 COLLJ CAPILLARY BLOOD SPEC: CPT

## 2017-04-20 PROCEDURE — 99207 ZZC NO CHARGE NURSE ONLY: CPT

## 2017-04-20 PROCEDURE — 85610 PROTHROMBIN TIME: CPT | Mod: QW

## 2017-04-20 NOTE — PROGRESS NOTES
ANTICOAGULATION FOLLOW-UP CLINIC VISIT    Patient Name:  Israel Moyer  Date:  4/20/2017  Contact Type:  Face to Face    SUBJECTIVE:     Patient Findings     Positives Unexplained INR or factor level change           OBJECTIVE    INR Protime   Date Value Ref Range Status   04/20/2017 3.8 (A) 0.86 - 1.14 Final       ASSESSMENT / PLAN  INR assessment THER    Recheck INR In: 2 WEEKS    INR Location Clinic      Anticoagulation Summary as of 4/20/2017     INR goal 2.0-3.0   Today's INR 3.8!   Maintenance plan 5 mg (2.5 mg x 2) on Mon, Wed; 2.5 mg (2.5 mg x 1) all other days   Full instructions 5 mg on Mon, Wed; 2.5 mg all other days   Weekly total 22.5 mg   No change documented Jamaica Christensen RN   Plan last modified Jamaica Christensen RN (4/6/2017)   Next INR check 5/4/2017   Target end date Indefinite    Indications   Long term current use of anticoagulant therapy [Z79.01]  ATRIAL FIBRILLATION [I48.91]         Anticoagulation Episode Summary     [facility]    Preferred lab     [facility]ers [facility]    Comments             See the Encounter Report to view Anticoagulation Flowsheet and Dosing Calendar (Go to Encounters tab in chart review, and find the Anticoagulation Therapy Visit)        Jamaica Christensen, SIXTO

## 2017-05-04 ENCOUNTER — ANTICOAGULATION THERAPY VISIT (OUTPATIENT)
Dept: NURSING | Facility: CLINIC | Age: 82
End: 2017-05-04
Payer: COMMERCIAL

## 2017-05-04 DIAGNOSIS — I48.91 ATRIAL FIBRILLATION, UNSPECIFIED TYPE (H): ICD-10-CM

## 2017-05-04 DIAGNOSIS — Z79.01 LONG TERM CURRENT USE OF ANTICOAGULANT THERAPY: ICD-10-CM

## 2017-05-04 LAB — INR POINT OF CARE: 2.7 (ref 0.86–1.14)

## 2017-05-04 PROCEDURE — 99207 ZZC NO CHARGE NURSE ONLY: CPT

## 2017-05-04 PROCEDURE — 85610 PROTHROMBIN TIME: CPT | Mod: QW

## 2017-05-04 PROCEDURE — 36416 COLLJ CAPILLARY BLOOD SPEC: CPT

## 2017-05-04 NOTE — MR AVS SNAPSHOT
Israel Moyer   5/4/2017 9:15 AM   Anticoagulation Therapy Visit    Description:  81 year old male   Provider:  NE ANTI COAG   Department:  Ne Nurse           INR as of 5/4/2017     Today's INR 2.7      Anticoagulation Summary as of 5/4/2017     INR goal 2.0-3.0   Today's INR 2.7   Full instructions 5 mg on Mon, Wed; 2.5 mg all other days   Next INR check 5/18/2017    Indications   Long term current use of anticoagulant therapy [Z79.01]  ATRIAL FIBRILLATION [I48.91]         Your next Anticoagulation Clinic appointment(s)     May 04, 2017  9:15 AM CDT   Anticoagulation Visit with NE ANTI COAG   [facility]    [address] [place] [id number] [phone]            May 18, 2017  9:30 AM CDT   Anticoagulation Visit with NE ANTI COAG   [facility] ([address] [id number] [phone]              Contact Numbers     Please call [phone] to cancel and/or reschedule your appointment.  Please call [phone] with any problems or questions regarding your therapy          May 2017 Details    Sun Mon Tue Wed Thu Fri Sat      1               2               3               4      2.5 mg   See details      5      2.5 mg         6      2.5 mg           7      2.5 mg         8      5 mg         9      2.5 mg         10      5 mg         11      2.5 mg         12      2.5 mg         13      2.5 mg           14      2.5 mg         15      5 mg         16      2.5 mg         17      5 mg         18            19               20                 21               22               23               24               25               26               27                 28               29               30               31                   Date Details   05/04 This INR check       Date of next INR:  5/18/2017         How to take your warfarin dose     To take:  2.5 mg Take 1 of  the 2.5 mg tablets.    To take:  5 mg Take 2 of the 2.5 mg tablets.

## 2017-05-04 NOTE — PROGRESS NOTES
ANTICOAGULATION FOLLOW-UP CLINIC VISIT    Patient Name:  Israel Moyer  Date:  5/4/2017  Contact Type:  Face to Face    SUBJECTIVE:     Patient Findings     Positives No Problem Findings           OBJECTIVE    INR Protime   Date Value Ref Range Status   05/04/2017 2.7 (A) 0.86 - 1.14 Final       ASSESSMENT / PLAN  INR assessment THER    Recheck INR In: 2 WEEKS    INR Location Clinic      Anticoagulation Summary as of 5/4/2017     INR goal 2.0-3.0   Today's INR 2.7   Maintenance plan 5 mg (2.5 mg x 2) on Mon, Wed; 2.5 mg (2.5 mg x 1) all other days   Full instructions 5 mg on Mon, Wed; 2.5 mg all other days   Weekly total 22.5 mg   No change documented Jamaica Christensen RN   Plan last modified Jamaica Christensen RN (4/6/2017)   Next INR check 5/18/2017   Target end date Indefinite    Indications   Long term current use of anticoagulant therapy [Z79.01]  ATRIAL FIBRILLATION [I48.91]         Anticoagulation Episode Summary     [facility]    Preferred lab     Send INR reminders to [facility]    Comments             See the Encounter Report to view Anticoagulation Flowsheet and Dosing Calendar (Go to Encounters tab in chart review, and find the Anticoagulation Therapy Visit)        Jamaica Christensen, SIXTO

## 2017-05-18 ENCOUNTER — ANTICOAGULATION THERAPY VISIT (OUTPATIENT)
Dept: NURSING | Facility: CLINIC | Age: 82
End: 2017-05-18
Payer: COMMERCIAL

## 2017-05-18 DIAGNOSIS — I48.91 ATRIAL FIBRILLATION, UNSPECIFIED TYPE (H): ICD-10-CM

## 2017-05-18 DIAGNOSIS — Z79.01 LONG TERM CURRENT USE OF ANTICOAGULANT THERAPY: ICD-10-CM

## 2017-05-18 LAB — INR POINT OF CARE: 3 (ref 0.86–1.14)

## 2017-05-18 PROCEDURE — 85610 PROTHROMBIN TIME: CPT | Mod: QW

## 2017-05-18 PROCEDURE — 36416 COLLJ CAPILLARY BLOOD SPEC: CPT

## 2017-05-18 PROCEDURE — 99207 ZZC NO CHARGE NURSE ONLY: CPT

## 2017-05-18 NOTE — MR AVS SNAPSHOT
Israel Moyer   5/18/2017 9:30 AM   Anticoagulation Therapy Visit    Description:  81 year old male   Provider:  NE ANTI EDDY   Department:  Ne Nurse           INR as of 5/18/2017     Today's INR 3.0      Anticoagulation Summary as of 5/18/2017     INR goal 2.0-3.0   Today's INR 3.0   Full instructions 5 mg on Mon, Wed; 2.5 mg all other days   Next INR check 6/15/2017    Indications   Long term current use of anticoagulant therapy [Z79.01]  ATRIAL FIBRILLATION [I48.91]         Your next Anticoagulation Clinic appointment(s)     Mark 15, 2017  9:45 AM CDT   Anticoagulation [facility] with NE ANTI COAG   [facility]    [address] [place] [id number] [phone]              Contact Numbers     Please call [phone] to cancel and/or reschedule your appointment.  Please call [phone] with any problems or questions regarding your therapy          May 2017 Details    Sun Mon Tue Wed Thu Fri Sat      1               2               3               4               5               6                 7               8               9               10               11               12               13                 14               15               16               17               18      2.5 mg   See details      19      2.5 mg         20      2.5 mg           21      2.5 mg         22      5 mg         23      2.5 mg         24      5 mg         25      2.5 mg         26      2.5 mg         27      2.5 mg           28      2.5 mg         29      5 mg         30      2.5 mg         31      5 mg             Date Details   05/18 This INR check               How to take your warfarin dose     To take:  2.5 mg Take 1 of the 2.5 mg tablets.    To take:  5 mg Take 2 of the 2.5 mg tablets.           June 2017 Details    Sun Mon Tue Wed Thu Fri Sat         1      2.5 mg         2      2.5 mg         3      2.5 mg           4      2.5 mg         5      5  mg         6      2.5 mg         7      5 mg         8      2.5 mg         9      2.5 mg         10      2.5 mg           11      2.5 mg         12      5 mg         13      2.5 mg         14      5 mg         15            16               17                 18               19               20               21               22               23               24                 25               26               27               28               29               30                 Date Details   No additional details    Date of next INR:  6/15/2017         How to take your warfarin dose     To take:  2.5 mg Take 1 of the 2.5 mg tablets.    To take:  5 mg Take 2 of the 2.5 mg tablets.

## 2017-05-18 NOTE — PROGRESS NOTES
ANTICOAGULATION FOLLOW-UP CLINIC VISIT    Patient Name:  Israel Moyer  Date:  5/18/2017  Contact Type:  Face to Face    SUBJECTIVE:     Patient Findings     Positives No Problem Findings           OBJECTIVE    INR Protime   Date Value Ref Range Status   05/18/2017 3.0 (A) 0.86 - 1.14 Final       ASSESSMENT / PLAN  INR assessment THER    Recheck INR In: 4 WEEKS    INR Location Clinic      Anticoagulation Summary as of 5/18/2017     INR goal 2.0-3.0   Today's INR 3.0   Maintenance plan 5 mg (2.5 mg x 2) on Mon, Wed; 2.5 mg (2.5 mg x 1) all other days   Full instructions 5 mg on Mon, Wed; 2.5 mg all other days   Weekly total 22.5 mg   No change documented Jamaica Christensen RN   Plan last modified Jamaica Christensen RN (4/6/2017)   Next INR check 6/15/2017   Target end date Indefinite    Indications   Long term current use of anticoagulant therapy [Z79.01]  ATRIAL FIBRILLATION [I48.91]         Anticoagulation Episode Summary     [facility]    Preferred lab     Send INR reminders to [facility]    Comments             See the Encounter Report to view Anticoagulation Flowsheet and Dosing Calendar (Go to Encounters tab in chart review, and find the Anticoagulation Therapy Visit)        Jamaica Christensen, SIXTO

## 2017-06-15 ENCOUNTER — ANTICOAGULATION THERAPY VISIT (OUTPATIENT)
Dept: NURSING | Facility: CLINIC | Age: 82
End: 2017-06-15
Payer: COMMERCIAL

## 2017-06-15 DIAGNOSIS — Z79.01 LONG TERM CURRENT USE OF ANTICOAGULANT THERAPY: ICD-10-CM

## 2017-06-15 DIAGNOSIS — I48.91 ATRIAL FIBRILLATION, UNSPECIFIED TYPE (H): ICD-10-CM

## 2017-06-15 LAB — INR POINT OF CARE: 3.2 (ref 0.86–1.14)

## 2017-06-15 PROCEDURE — 85610 PROTHROMBIN TIME: CPT | Mod: QW

## 2017-06-15 PROCEDURE — 36416 COLLJ CAPILLARY BLOOD SPEC: CPT

## 2017-06-15 PROCEDURE — 99207 ZZC NO CHARGE NURSE ONLY: CPT

## 2017-06-15 NOTE — MR AVS SNAPSHOT
Israel Moyer   6/15/2017 9:45 AM   Anticoagulation Therapy Visit    Description:  81 year old male   Provider:  NE ANTI EDDY   Department:  Ne Nurse           INR as of 6/15/2017     Today's INR 3.2!      Anticoagulation Summary as of 6/15/2017     INR goal 2.0-3.0   Today's INR 3.2!   Full instructions 5 mg on Mon, Wed; 2.5 mg all other days   Next INR check 7/13/2017    Indications   Long term current use of anticoagulant therapy [Z79.01]  ATRIAL FIBRILLATION [I48.91]         Instructions    Eat a few more green vegetables.         Your next Anticoagulation Clinic appointment(s)     Jul 13, 2017  9:30 AM CDT   [facility]agulation [facility] with NE ANTI COAG   [facility]    [address] [place] [id number] [phone]              Contact Numbers     Please call [phone] to cancel and/or reschedule your appointment.  Please call [phone] with any problems or questions regarding your therapy          June 2017 Details    Sun Mon Tue Wed Thu Fri Sat         1               2               3                 4               5               6               7               8               9               10                 11               12               13               14               15      2.5 mg   See details      16      2.5 mg         17      2.5 mg           18      2.5 mg         19      5 mg         20      2.5 mg         21      5 mg         22      2.5 mg         23      2.5 mg         24      2.5 mg           25      2.5 mg         26      5 mg         27      2.5 mg         28      5 mg         29      2.5 mg         30      2.5 mg           Date Details   06/15 This INR check               How to take your warfarin dose     To take:  2.5 mg Take 1 of the 2.5 mg tablets.    To take:  5 mg Take 2 of the 2.5 mg tablets.           July 2017 Details    Sun Mon Tue Wed Thu Fri Sat           1      2.5 mg           2      2.5 mg          3      5 mg         4      2.5 mg         5      5 mg         6      2.5 mg         7      2.5 mg         8      2.5 mg           9      2.5 mg         10      5 mg         11      2.5 mg         12      5 mg         13            14               15                 16               17               18               19               20               21               22                 23               24               25               26               27               28               29                 30               31                     Date Details   No additional details    Date of next INR:  7/13/2017         How to take your warfarin dose     To take:  2.5 mg Take 1 of the 2.5 mg tablets.    To take:  5 mg Take 2 of the 2.5 mg tablets.

## 2017-06-15 NOTE — PROGRESS NOTES
ANTICOAGULATION FOLLOW-UP CLINIC VISIT    Patient Name:  Israel Moyer  Date:  6/15/2017  Contact Type:  Face to Face    SUBJECTIVE:     Patient Findings     Positives No Problem Findings           OBJECTIVE    INR Protime   Date Value Ref Range Status   06/15/2017 3.2 (A) 0.86 - 1.14 Final       ASSESSMENT / PLAN  INR assessment THER    Recheck INR In: 4 WEEKS    INR Location Clinic      Anticoagulation Summary as of 6/15/2017     INR goal 2.0-3.0   Today's INR 3.2!   Maintenance plan 5 mg (2.5 mg x 2) on Mon, Wed; 2.5 mg (2.5 mg x 1) all other days   Full instructions 5 mg on Mon, Wed; 2.5 mg all other days   Weekly total 22.5 mg   No change documented Jamaica Christensen RN   Plan last modified Jamaica Christensen RN (4/6/2017)   Next INR check 7/13/2017   Target end date Indefinite    Indications   Long term current use of anticoagulant therapy [Z79.01]  ATRIAL FIBRILLATION [I48.91]         Anticoagulation Episode Summary     [facility]    Preferred lab     [facility]    Comments             See the Encounter Report to view Anticoagulation Flowsheet and Dosing Calendar (Go to Encounters tab in chart review, and find the Anticoagulation Therapy Visit)        Jamaica Christensen, SIXTO

## 2017-07-13 ENCOUNTER — ANTICOAGULATION THERAPY VISIT (OUTPATIENT)
Dept: NURSING | Facility: CLINIC | Age: 82
End: 2017-07-13
Payer: COMMERCIAL

## 2017-07-13 DIAGNOSIS — I48.91 ATRIAL FIBRILLATION, UNSPECIFIED TYPE (H): ICD-10-CM

## 2017-07-13 DIAGNOSIS — Z79.01 LONG TERM CURRENT USE OF ANTICOAGULANT THERAPY: ICD-10-CM

## 2017-07-13 LAB — INR POINT OF CARE: 2.1 (ref 0.86–1.14)

## 2017-07-13 PROCEDURE — 36416 COLLJ CAPILLARY BLOOD SPEC: CPT

## 2017-07-13 PROCEDURE — 99207 ZZC NO CHARGE NURSE ONLY: CPT

## 2017-07-13 PROCEDURE — 85610 PROTHROMBIN TIME: CPT | Mod: QW

## 2017-07-13 NOTE — MR AVS SNAPSHOT
Israel Moyer   7/13/2017 9:30 AM   Anticoagulation Therapy Visit    Description:  81 year old male   Provider:  NE ANTI EDDY   Department:  Ne Nurse           INR as of 7/13/2017     Today's INR 2.1      Anticoagulation Summary as of 7/13/2017     INR goal 2.0-3.0   Today's INR 2.1   Full instructions 5 mg on Mon, Wed; 2.5 mg all other days   Next INR check 8/10/2017    Indications   Long term current use of anticoagulant therapy [Z79.01]  ATRIAL FIBRILLATION [I48.91]         Your next Anticoagulation Clinic appointment(s)     Aug 10, 2017  9:30 AM CDT   Anticoagulation Visit with NE ANTI COAG   [facility]    [address] [place] [id number] [phone]              Contact Numbers     Please call [phone] to cancel and/or reschedule your appointment.  Please call [phone] with any problems or questions regarding your therapy          July 2017 Details    Sun Mon Tue Wed Thu Fri Sat           1                 2               3               4               5               6               7               8                 9               10               11               12               13      2.5 mg   See details      14      2.5 mg         15      2.5 mg           16      2.5 mg         17      5 mg         18      2.5 mg         19      5 mg         20      2.5 mg         21      2.5 mg         22      2.5 mg           23      2.5 mg         24      5 mg         25      2.5 mg         26      5 mg         27      2.5 mg         28      2.5 mg         29      2.5 mg           30      2.5 mg         31      5 mg               Date Details   07/13 This INR check               How to take your warfarin dose     To take:  2.5 mg Take 1 of the 2.5 mg tablets.    To take:  5 mg Take 2 of the 2.5 mg tablets.           August 2017 Details    Sun Mon Tue Wed Thu Fri Sat       1      2.5 mg         2      5 mg         3      2.5 mg          4      2.5 mg         5      2.5 mg           6      2.5 mg         7      5 mg         8      2.5 mg         9      5 mg         10            11               12                 13               14               15               16               17               18               19                 20               21               22               23               24               25               26                 27               28               29               30               31                  Date Details   No additional details    Date of next INR:  8/10/2017         How to take your warfarin dose     To take:  2.5 mg Take 1 of the 2.5 mg tablets.    To take:  5 mg Take 2 of the 2.5 mg tablets.

## 2017-07-13 NOTE — PROGRESS NOTES
ANTICOAGULATION FOLLOW-UP CLINIC VISIT    Patient Name:  Israel Moyer  Date:  7/13/2017  Contact Type:  Face to Face    SUBJECTIVE:     Patient Findings     Positives No Problem Findings           OBJECTIVE    INR Protime   Date Value Ref Range Status   07/13/2017 2.1 (A) 0.86 - 1.14 Final       ASSESSMENT / PLAN  INR assessment THER    Recheck INR In: 4 WEEKS    INR Location Clinic      Anticoagulation Summary as of 7/13/2017     INR goal 2.0-3.0   Today's INR 2.1   Maintenance plan 5 mg (2.5 mg x 2) on Mon, Wed; 2.5 mg (2.5 mg x 1) all other days   Full instructions 5 mg on Mon, Wed; 2.5 mg all other days   Weekly total 22.5 mg   No change documented Jamaica Christensen RN   Plan last modified Jamaica Christensen RN (4/6/2017)   Next INR check 8/10/2017   Target end date Indefinite    Indications   Long term current use of anticoagulant therapy [Z79.01]  ATRIAL FIBRILLATION [I48.91]         Anticoagulation Episode Summary     [facility] check [facility]    Preferred lab     Send INR reminders to [facility]    Comments             See the Encounter Report to view Anticoagulation Flowsheet and Dosing Calendar (Go to Encounters tab in chart review, and find the Anticoagulation Therapy Visit)        Jamaica Christensen, SIXTO

## 2017-08-10 ENCOUNTER — ANTICOAGULATION THERAPY VISIT (OUTPATIENT)
Dept: NURSING | Facility: CLINIC | Age: 82
End: 2017-08-10
Payer: COMMERCIAL

## 2017-08-10 DIAGNOSIS — I48.91 ATRIAL FIBRILLATION, UNSPECIFIED TYPE (H): ICD-10-CM

## 2017-08-10 DIAGNOSIS — Z79.01 LONG TERM CURRENT USE OF ANTICOAGULANT THERAPY: ICD-10-CM

## 2017-08-10 LAB — INR POINT OF CARE: 2.8 (ref 0.86–1.14)

## 2017-08-10 PROCEDURE — 85610 PROTHROMBIN TIME: CPT | Mod: QW

## 2017-08-10 PROCEDURE — 99207 ZZC NO CHARGE NURSE ONLY: CPT

## 2017-08-10 PROCEDURE — 36416 COLLJ CAPILLARY BLOOD SPEC: CPT

## 2017-08-10 NOTE — PROGRESS NOTES
ANTICOAGULATION FOLLOW-UP CLINIC VISIT    Patient Name:  Israel Moyer  Date:  8/10/2017  Contact Type:  Face to Face    SUBJECTIVE:     Patient Findings     Positives No Problem Findings           OBJECTIVE    INR Protime   Date Value Ref Range Status   08/10/2017 2.8 (A) 0.86 - 1.14 Final       ASSESSMENT / PLAN  INR assessment THER    Recheck INR In: 6 WEEKS    INR Location Clinic      Anticoagulation Summary as of 8/10/2017     INR goal 2.0-3.0   Today's INR 2.8   Maintenance plan 5 mg (2.5 mg x 2) on Mon, Wed; 2.5 mg (2.5 mg x 1) all other days   Full instructions 5 mg on Mon, Wed; 2.5 mg all other days   Weekly total 22.5 mg   No change documented Jamaica Christensen RN   Plan last modified Jamaica Christensen RN (4/6/2017)   Next INR check 9/21/2017   Target end date Indefinite    Indications   Long term current use of anticoagulant therapy [Z79.01]  ATRIAL FIBRILLATION [I48.91]         Anticoagulation Episode Summary     [facility]    Preferred lab     Send INR reminders to [facility]    Comments             See the Encounter Report to view Anticoagulation Flowsheet and Dosing Calendar (Go to Encounters tab in chart review, and find the Anticoagulation Therapy Visit)        Jamaica Christensen, SIXTO

## 2017-08-10 NOTE — MR AVS SNAPSHOT
Israel Moyer   8/10/2017 9:30 AM   Anticoagulation Therapy Visit    Description:  81 year old male   Provider:  NE ANTI COAG   Department:  Ne Nurse           INR as of 8/10/2017     Today's INR 2.8      Anticoagulation Summary as of 8/10/2017     INR goal 2.0-3.0   Today's INR 2.8   Full instructions 5 mg on Mon, Wed; 2.5 mg all other days   Next INR check 9/21/2017    Indications   Long term current use of anticoagulant therapy [Z79.01]  ATRIAL FIBRILLATION [I48.91]         Your next Anticoagulation Clinic appointment(s)     Sep 21, 2017  9:30 AM CDT   Anticoagulation Visit with NE ANTI EDDY   [facility]    [address] [place] [id number] [phone]              Contact Numbers     Please call [phone] to cancel and/or reschedule your appointment.  Please call [phone] with any problems or questions regarding your therapy          August 2017 Details    Sun Mon Tue Wed Thu Fri Sat       1               2               3               4               5                 6               7               8               9               10      2.5 mg   See details      11      2.5 mg         12      2.5 mg           13      2.5 mg         14      5 mg         15      2.5 mg         16      5 mg         17      2.5 mg         18      2.5 mg         19      2.5 mg           20      2.5 mg         21      5 mg         22      2.5 mg         23      5 mg         24      2.5 mg         25      2.5 mg         26      2.5 mg           27      2.5 mg         28      5 mg         29      2.5 mg         30      5 mg         31      2.5 mg            Date Details   08/10 This INR check               How to take your warfarin dose     To take:  2.5 mg Take 1 of the 2.5 mg tablets.    To take:  5 mg Take 2 of the 2.5 mg tablets.           September 2017 Details    Sun Mon Tue Wed Thu Fri Sat          1      2.5 mg         2      2.5 mg            3      2.5 mg         4      5 mg         5      2.5 mg         6      5 mg         7      2.5 mg         8      2.5 mg         9      2.5 mg           10      2.5 mg         11      5 mg         12      2.5 mg         13      5 mg         14      2.5 mg         15      2.5 mg         16      2.5 mg           17      2.5 mg         18      5 mg         19      2.5 mg         20      5 mg         21            22               23                 24               25               26               27               28               29               30                Date Details   No additional details    Date of next INR:  9/21/2017         How to take your warfarin dose     To take:  2.5 mg Take 1 of the 2.5 mg tablets.    To take:  5 mg Take 2 of the 2.5 mg tablets.

## 2017-09-21 ENCOUNTER — ANTICOAGULATION THERAPY VISIT (OUTPATIENT)
Dept: NURSING | Facility: CLINIC | Age: 82
End: 2017-09-21
Payer: COMMERCIAL

## 2017-09-21 DIAGNOSIS — Z79.01 LONG TERM CURRENT USE OF ANTICOAGULANT THERAPY: ICD-10-CM

## 2017-09-21 DIAGNOSIS — I48.91 ATRIAL FIBRILLATION, UNSPECIFIED TYPE (H): ICD-10-CM

## 2017-09-21 LAB — INR POINT OF CARE: 3.3 (ref 0.86–1.14)

## 2017-09-21 PROCEDURE — 99207 ZZC NO CHARGE NURSE ONLY: CPT

## 2017-09-21 PROCEDURE — 36416 COLLJ CAPILLARY BLOOD SPEC: CPT

## 2017-09-21 PROCEDURE — 85610 PROTHROMBIN TIME: CPT | Mod: QW

## 2017-09-21 NOTE — MR AVS SNAPSHOT
Israel Moyer   9/21/2017 9:30 AM   Anticoagulation Therapy Visit    Description:  81 year old male   Provider:  WESLY KAM   Department:  Ne Nurse           INR as of 9/21/2017     Today's INR 3.3!      Anticoagulation Summary as of 9/21/2017     INR goal 2.0-3.0   Today's INR 3.3!   Full instructions 5 mg on Mon, Wed; 2.5 mg all other days   Next INR check 10/5/2017    Indications   Long term current use of anticoagulant therapy [Z79.01]  ATRIAL FIBRILLATION [I48.91]         Instructions    Eat more green vegetables.         Your next Anticoagulation Clinic appointment(s)     Oct 05, 2017  9:30 AM CDT   Anticoagulation Visit with NE ANTI COAG   [facility]    [address] [place] [id number] [phone]              Contact Numbers     Please call [phone] to cancel and/or reschedule your appointment.  Please call [phone] with any problems or questions regarding your therapy          September 2017 Details    Sun Mon Tue Wed Thu Fri Sat          1               2                 3               4               5               6               7               8               9                 10               11               12               13               14               15               16                 17               18               19               20               21      2.5 mg   See details      22      2.5 mg         23      2.5 mg           24      2.5 mg         25      5 mg         26      2.5 mg         27      5 mg         28      2.5 mg         29      2.5 mg         30      2.5 mg          Date Details   09/21 This INR check               How to take your warfarin dose     To take:  2.5 mg Take 1 of the 2.5 mg tablets.    To take:  5 mg Take 2 of the 2.5 mg tablets.           October 2017 Details    Sun Mon Tue Wed Thu Fri Sat     1      2.5 mg         2      5 mg         3      2.5 mg         4      5 mg          5            6               7                 8               9               10               11               12               13               14                 15               16               17               18               19               20               21                 22               23               24               25               26               27               28                 29               30               31                    Date Details   No additional details    Date of next INR:  10/5/2017         How to take your warfarin dose     To take:  2.5 mg Take 1 of the 2.5 mg tablets.    To take:  5 mg Take 2 of the 2.5 mg tablets.

## 2017-09-21 NOTE — PROGRESS NOTES
ANTICOAGULATION FOLLOW-UP CLINIC VISIT    Patient Name:  Israel Moyer  Date:  9/21/2017  Contact Type:  Face to Face    SUBJECTIVE:     Patient Findings     Positives No Problem Findings           OBJECTIVE    INR Protime   Date Value Ref Range Status   09/21/2017 3.3 (A) 0.86 - 1.14 Final       ASSESSMENT / PLAN  INR assessment THER    Recheck INR In: 2 WEEKS    INR Location Clinic      Anticoagulation Summary as of 9/21/2017     INR goal 2.0-3.0   Today's INR 3.3!   Maintenance plan 5 mg (2.5 mg x 2) on Mon, Wed; 2.5 mg (2.5 mg x 1) all other days   Full instructions 5 mg on Mon, Wed; 2.5 mg all other days   Weekly total 22.5 mg   No change documented Jamaica Christensen RN   Plan last modified Jamaica Christensen RN (4/6/2017)   Next INR check 10/5/2017   Target end date Indefinite    Indications   Long term current use of anticoagulant therapy [Z79.01]  ATRIAL FIBRILLATION [I48.91]         Anticoagulation Episode Summary     [facility]    Preferred lab     [facility]    Comments             See the Encounter Report to view Anticoagulation Flowsheet and Dosing Calendar (Go to Encounters tab in chart review, and find the Anticoagulation Therapy Visit)        Jamaica Christensen, SIXTO

## 2017-10-05 ENCOUNTER — ANTICOAGULATION THERAPY VISIT (OUTPATIENT)
Dept: NURSING | Facility: CLINIC | Age: 82
End: 2017-10-05
Payer: COMMERCIAL

## 2017-10-05 DIAGNOSIS — Z79.01 LONG TERM CURRENT USE OF ANTICOAGULANT THERAPY: ICD-10-CM

## 2017-10-05 DIAGNOSIS — I48.91 ATRIAL FIBRILLATION, UNSPECIFIED TYPE (H): ICD-10-CM

## 2017-10-05 LAB — INR POINT OF CARE: 2.6 (ref 0.86–1.14)

## 2017-10-05 PROCEDURE — 99207 ZZC NO CHARGE NURSE ONLY: CPT

## 2017-10-05 PROCEDURE — 36416 COLLJ CAPILLARY BLOOD SPEC: CPT

## 2017-10-05 PROCEDURE — 85610 PROTHROMBIN TIME: CPT | Mod: QW

## 2017-10-05 NOTE — PROGRESS NOTES
ANTICOAGULATION FOLLOW-UP CLINIC VISIT    Patient Name:  Israel Moyer  Date:  10/5/2017  Contact Type:  Face to Face    SUBJECTIVE:     Patient Findings     Positives No Problem Findings           OBJECTIVE    INR Protime   Date Value Ref Range Status   10/05/2017 2.6 (A) 0.86 - 1.14 Final       ASSESSMENT / PLAN  INR assessment THER    Recheck INR In: 4 WEEKS    INR Location Clinic      Anticoagulation Summary as of 10/5/2017     INR goal 2.0-3.0   Today's INR 2.6   Maintenance plan 5 mg (2.5 mg x 2) on Mon, Wed; 2.5 mg (2.5 mg x 1) all other days   Full instructions 5 mg on Mon, Wed; 2.5 mg all other days   Weekly total 22.5 mg   No change documented Jamaica Christensen RN   Plan last modified Jamaica Christensen RN (4/6/2017)   Next INR check 11/2/2017   Target end date Indefinite    Indications   Long term current use of anticoagulant therapy [Z79.01]  ATRIAL FIBRILLATION [I48.91]         Anticoagulation Episode Summary     [facility] check [facility]    Preferred lab     Send INR reminders to [facility]    Comments             See the Encounter Report to view Anticoagulation Flowsheet and Dosing Calendar (Go to Encounters tab in chart review, and find the Anticoagulation Therapy Visit)        Jamaica Christensen, SIXTO

## 2017-11-02 ENCOUNTER — ANTICOAGULATION THERAPY VISIT (OUTPATIENT)
Dept: NURSING | Facility: CLINIC | Age: 82
End: 2017-11-02
Payer: COMMERCIAL

## 2017-11-02 DIAGNOSIS — Z79.01 LONG TERM CURRENT USE OF ANTICOAGULANT THERAPY: ICD-10-CM

## 2017-11-02 DIAGNOSIS — I48.91 ATRIAL FIBRILLATION, UNSPECIFIED TYPE (H): ICD-10-CM

## 2017-11-02 LAB — INR POINT OF CARE: 2.6 (ref 0.86–1.14)

## 2017-11-02 PROCEDURE — 99207 ZZC NO CHARGE NURSE ONLY: CPT

## 2017-11-02 PROCEDURE — 85610 PROTHROMBIN TIME: CPT | Mod: QW

## 2017-11-02 PROCEDURE — 36416 COLLJ CAPILLARY BLOOD SPEC: CPT

## 2017-11-02 NOTE — MR AVS SNAPSHOT
Israel Moyer   11/2/2017 9:30 AM   Anticoagulation Therapy Visit    Description:  82 year old male   Provider:  NE ANTI COAWILLIAM   Department:  Ne Nurse           INR as of 11/2/2017     Today's INR 2.6      Anticoagulation Summary as of 11/2/2017     INR goal 2.0-3.0   Today's INR 2.6   Full instructions 5 mg on Mon, Wed; 2.5 mg all other days   Next INR check 12/14/2017    Indications   Long term current use of anticoagulant therapy [Z79.01]  ATRIAL FIBRILLATION [I48.91]         Your next Anticoagulation Clinic appointment(s)     Dec 14, 2017  9:30 AM CST   Anticoagulation Visit with NE ANTI COAG   [facility]    [address] [place] [id number] [phone]              Contact Numbers     Please call [phone] to cancel and/or reschedule your appointment.  Please call [phone] with any problems or questions regarding your therapy          November 2017 Details    Sun Mon Tue Wed Thu Fri Sat        1               2      2.5 mg   See details      3      2.5 mg         4      2.5 mg           5      2.5 mg         6      5 mg         7      2.5 mg         8      5 mg         9      2.5 mg         10      2.5 mg         11      2.5 mg           12      2.5 mg         13      5 mg         14      2.5 mg         15      5 mg         16      2.5 mg         17      2.5 mg         18      2.5 mg           19      2.5 mg         20      5 mg         21      2.5 mg         22      5 mg         23      2.5 mg         24      2.5 mg         25      2.5 mg           26      2.5 mg         27      5 mg         28      2.5 mg         29      5 mg         30      2.5 mg            Date Details   11/02 This INR check               How to take your warfarin dose     To take:  2.5 mg Take 1 of the 2.5 mg tablets.    To take:  5 mg Take 2 of the 2.5 mg tablets.           December 2017 Details    Sun Mon Tue Wed Thu Fri Sat          1      2.5 mg          2      2.5 mg           3      2.5 mg         4      5 mg         5      2.5 mg         6      5 mg         7      2.5 mg         8      2.5 mg         9      2.5 mg           10      2.5 mg         11      5 mg         12      2.5 mg         13      5 mg         14            15               16                 17               18               19               20               21               22               23                 24               25               26               27               28               29               30                 31                      Date Details   No additional details    Date of next INR:  12/14/2017         How to take your warfarin dose     To take:  2.5 mg Take 1 of the 2.5 mg tablets.    To take:  5 mg Take 2 of the 2.5 mg tablets.

## 2017-11-02 NOTE — PROGRESS NOTES
ANTICOAGULATION FOLLOW-UP CLINIC VISIT    Patient Name:  Israel Moyer  Date:  11/2/2017  Contact Type:  Face to Face    SUBJECTIVE:     Patient Findings     Positives No Problem Findings           OBJECTIVE    INR Protime   Date Value Ref Range Status   11/02/2017 2.6 (A) 0.86 - 1.14 Final       ASSESSMENT / PLAN  INR assessment THER    Recheck INR In: 6 WEEKS    INR Location Clinic      Anticoagulation Summary as of 11/2/2017     INR goal 2.0-3.0   Today's INR 2.6   Maintenance plan 5 mg (2.5 mg x 2) on Mon, Wed; 2.5 mg (2.5 mg x 1) all other days   Full instructions 5 mg on Mon, Wed; 2.5 mg all other days   Weekly total 22.5 mg   No change documented Jamaica Christensen RN   Plan last modified Jamaica Christensen RN (4/6/2017)   Next INR check 12/14/2017   Target end date Indefinite    Indications   Long term current use of anticoagulant therapy [Z79.01]  ATRIAL FIBRILLATION [I48.91]         Anticoagulation Episode Summary     [facility] check location [facility]    Preferred lab     Send INR reminders to [facility]    Comments             See the Encounter Report to view Anticoagulation Flowsheet and Dosing Calendar (Go to Encounters tab in chart review, and find the Anticoagulation Therapy Visit)        Jamaica Christensen, SIXTO

## 2017-12-14 ENCOUNTER — ANTICOAGULATION THERAPY VISIT (OUTPATIENT)
Dept: NURSING | Facility: CLINIC | Age: 82
End: 2017-12-14
Payer: COMMERCIAL

## 2017-12-14 DIAGNOSIS — Z79.01 LONG TERM CURRENT USE OF ANTICOAGULANT THERAPY: ICD-10-CM

## 2017-12-14 LAB — INR POINT OF CARE: 2.9 (ref 0.86–1.14)

## 2017-12-14 PROCEDURE — 85610 PROTHROMBIN TIME: CPT | Mod: QW

## 2017-12-14 PROCEDURE — 99207 ZZC NO CHARGE NURSE ONLY: CPT

## 2017-12-14 PROCEDURE — 36416 COLLJ CAPILLARY BLOOD SPEC: CPT

## 2017-12-14 NOTE — MR AVS SNAPSHOT
Israel Moyer   12/14/2017 9:30 AM   Anticoagulation Therapy Visit    Description:  82 year old male   Provider:  NE ANTI EDDY   Department:  Ne Nurse           INR as of 12/14/2017     Today's INR 2.9      Anticoagulation Summary as of 12/14/2017     INR goal 2.0-3.0   Today's INR 2.9   Full instructions 5 mg on Mon, Wed; 2.5 mg all other days   Next INR check 12/21/2017    Indications   Long term current use of anticoagulant therapy [Z79.01]  ATRIAL FIBRILLATION [I48.91]         Your next Anticoagulation Clinic appointment(s)     Jan 25, 2018  9:30 AM CST   Anticoagulation Visit with NE ANTI COAG   [facility] ([facility])    [address] [place] [id number] [phone]              Contact Numbers     Please call [phone] to cancel and/or reschedule your appointment.  Please call [phone] with any problems or questions regarding your therapy          December 2017 Details    Sun Mon Tue Wed Thu Fri Sat          1               2                 3               4               5               6               7               8               9                 10               11               12               13               14      2.5 mg   See details      15      2.5 mg         16      2.5 mg           17      2.5 mg         18      5 mg         19      2.5 mg         20      5 mg         21            22               23                 24               25               26               27               28               29               30                 31                      Date Details   12/14 This INR check       Date of next INR:  12/21/2017         How to take your warfarin dose     To take:  2.5 mg Take 1 of the 2.5 mg tablets.    To take:  5 mg Take 2 of the 2.5 mg tablets.

## 2017-12-14 NOTE — PROGRESS NOTES
ANTICOAGULATION FOLLOW-UP CLINIC VISIT    Patient Name:  Israel Moyer  Date:  12/14/2017  Contact Type:  Face to Face    SUBJECTIVE:     Patient Findings     Positives No Problem Findings           OBJECTIVE    INR Protime   Date Value Ref Range Status   12/14/2017 2.9 (A) 0.86 - 1.14 Final       ASSESSMENT / PLAN  INR assessment THER    Recheck INR In: 6 WEEKS    INR Location Clinic      Anticoagulation Summary as of 12/14/2017     INR goal 2.0-3.0   Today's INR 2.9   Maintenance plan 5 mg (2.5 mg x 2) on Mon, Wed; 2.5 mg (2.5 mg x 1) all other days   Full instructions 5 mg on Mon, Wed; 2.5 mg all other days   Weekly total 22.5 mg   No change documented Mel Rasmussen RN   Plan last modified Jamaica Christensen RN (4/6/2017)   Next INR check 12/21/2017   Target end date Indefinite    Indications   Long term current use of anticoagulant therapy [Z79.01]  ATRIAL FIBRILLATION [I48.91]         Anticoagulation Episode Summary     [facility]    Preferred lab     [facility]    Comments             See the Encounter Report to view Anticoagulation Flowsheet and Dosing Calendar (Go to Encounters tab in chart review, and find the Anticoagulation Therapy Visit)    pt aware if signs of clotting (pain, tenderness, swelling, color change in leg or arm, SOB) and bleeding occur (blood in stool, urine, large bruising, bleeding gums, nosebleeds) to have INR check sooner. If sx severe report to ER or concerned for stroke call 911. If general questions or concerns arise, call clinic.         Mel Rasmussen RN

## 2018-01-25 ENCOUNTER — ANTICOAGULATION THERAPY VISIT (OUTPATIENT)
Dept: NURSING | Facility: CLINIC | Age: 83
End: 2018-01-25
Payer: COMMERCIAL

## 2018-01-25 DIAGNOSIS — Z79.01 LONG TERM CURRENT USE OF ANTICOAGULANT THERAPY: ICD-10-CM

## 2018-01-25 DIAGNOSIS — I48.20 CHRONIC ATRIAL FIBRILLATION (H): ICD-10-CM

## 2018-01-25 DIAGNOSIS — I48.91 ATRIAL FIBRILLATION (H): ICD-10-CM

## 2018-01-25 LAB — INR POINT OF CARE: 4.7 (ref 0.86–1.14)

## 2018-01-25 PROCEDURE — 99207 ZZC NO CHARGE NURSE ONLY: CPT

## 2018-01-25 PROCEDURE — 36416 COLLJ CAPILLARY BLOOD SPEC: CPT

## 2018-01-25 PROCEDURE — 85610 PROTHROMBIN TIME: CPT | Mod: QW

## 2018-01-25 NOTE — PROGRESS NOTES
ANTICOAGULATION FOLLOW-UP CLINIC VISIT    Patient Name:  Israel Moyer  Date:  1/25/2018  Contact Type:  Face to Face    SUBJECTIVE:     Patient Findings     Positives Unexplained INR or factor level change           OBJECTIVE    INR Protime   Date Value Ref Range Status   01/25/2018 4.7 (A) 0.86 - 1.14 Final       ASSESSMENT / PLAN  INR assessment SUPRA    Recheck INR In: 1 WEEK    INR Location Clinic      Anticoagulation Summary as of 1/25/2018     INR goal 2.0-3.0   Today's INR 4.7!   Maintenance plan 5 mg (2.5 mg x 2) on Mon, Wed; 2.5 mg (2.5 mg x 1) all other days   Full instructions 1/26: Hold; 1/27: Hold; Otherwise 5 mg on Mon, Wed; 2.5 mg all other days   Weekly total 22.5 mg   Plan last modified Jamaica Christensen, RN (4/6/2017)   Next INR check 2/1/2018   Target end date Indefinite    Indications   Long term current use of anticoagulant therapy [Z79.01]  ATRIAL FIBRILLATION [I48.91]         Anticoagulation Episode Summary     [facility]    Preferred lab     [facility]    Comments             See the Encounter Report to view Anticoagulation Flowsheet and Dosing Calendar (Go to Encounters tab in chart review, and find the Anticoagulation Therapy Visit)    Dosage adjustment made based on physician directed care plan.    Jamaica Christensen, RN

## 2018-01-25 NOTE — MR AVS SNAPSHOT
Israel Moyer   1/25/2018 9:30 AM   Anticoagulation Therapy Visit    Description:  82 year old male   Provider:  WESLY KAM   Department:  Ne Nurse           INR as of 1/25/2018     Today's INR 4.7!      Anticoagulation Summary as of 1/25/2018     INR goal 2.0-3.0   Today's INR 4.7!   Full instructions 1/26: Hold; 1/27: Hold; Otherwise 5 mg on Mon, Wed; 2.5 mg all other days   Next INR check 2/1/2018    Indications   Long term current use of anticoagulant therapy [Z79.01]  ATRIAL FIBRILLATION [I48.91]         Instructions    Eat some green vegetables today         Your next Anticoagulation Clinic appointment(s)     Feb 01, 2018  8:45 AM CST   Anticoagulation Visit with NE ANTI COAG   [facility]    [address] [place] [id number] [phone]              Contact Numbers     Please call [phone] to cancel and/or reschedule your appointment.  Please call [phone] with any problems or questions regarding your therapy          January 2018 Details    Sun Mon Tue Wed Thu Fri Sat      1               2               3               4               5               6                 7               8               9               10               11               12               13                 14               15               16               17               18               19               20                 21               22               23               24               25      2.5 mg   See details      26      Hold         27      Hold           28      2.5 mg         29      5 mg         30      2.5 mg         31      5 mg             Date Details   01/25 This INR check               How to take your warfarin dose     To take:  2.5 mg Take 1 of the 2.5 mg tablets.    To take:  5 mg Take 2 of the 2.5 mg tablets.    Hold Do not take your warfarin dose. See the Details table to the right for additional instructions.                 February 2018 Details    Sun Mon Tue Wed Thu Fri Sat         1            2               3                 4               5               6               7               8               9               10                 11               12               13               14               15               16               17                 18               19               20               21               22               23               24                 25               26               27               28                   Date Details   No additional details    Date of next INR:  2/1/2018         How to take your warfarin dose     To take:  2.5 mg Take 1 of the 2.5 mg tablets.

## 2018-01-26 NOTE — TELEPHONE ENCOUNTER
"Requested Prescriptions   Pending Prescriptions Disp Refills     warfarin (COUMADIN) 2.5 MG tablet [Pharmacy Med Name: WARFARIN 2.5MG      TAB]  Last Written Prescription Date:  4/6/2017  Last Fill Quantity: 180 tablet,  # refills: 1   Last Office Visit with [facility], [facility] or [facility] prescribing provider:  4/6/2017  Future Office Visit:      180 tablet 1     Sig: TAKE TWO TABLETS (5 MG) BY MOUTH ON MONDAY, WEDNESDAY, AND FRIDAY, AND TAKE ONE TABLET (2.5 MG) ALL OTHER DAYS OF THE WEEK    Vitamin K Antagonists Failed    1/25/2018  7:56 PM       Failed - INR is within goal in the past 6 weeks    Confirm INR is within goal in the past 6 weeks.     Recent Labs   Lab Test 01/25/18   INR  4.7*          Passed - Recent or future visit with authorizing provider's specialty    Patient had office visit in the last year or has a visit in the next 30 days with authorizing provider.  See \"Patient Info\" tab in inbasket, or \"Choose Columns\" in Meds & Orders section of the refill encounter.        Passed - Patient is 18 years of age or older          "

## 2018-01-30 RX ORDER — WARFARIN SODIUM 2.5 MG/1
TABLET ORAL
Qty: 180 TABLET | Refills: 1 | Status: SHIPPED | OUTPATIENT
Start: 2018-01-30 | End: 2018-10-11

## 2018-01-30 NOTE — TELEPHONE ENCOUNTER
Prescription approved per [facility] [place] Refill Protocol.  Mel Rasmussen,Clinic Rn  [place]

## 2018-02-01 ENCOUNTER — ANTICOAGULATION THERAPY VISIT (OUTPATIENT)
Dept: NURSING | Facility: CLINIC | Age: 83
End: 2018-02-01
Payer: COMMERCIAL

## 2018-02-01 DIAGNOSIS — I48.91 ATRIAL FIBRILLATION (H): ICD-10-CM

## 2018-02-01 DIAGNOSIS — Z79.01 LONG TERM CURRENT USE OF ANTICOAGULANT THERAPY: ICD-10-CM

## 2018-02-01 LAB — INR POINT OF CARE: 2.8 (ref 0.86–1.14)

## 2018-02-01 PROCEDURE — 36416 COLLJ CAPILLARY BLOOD SPEC: CPT

## 2018-02-01 PROCEDURE — 85610 PROTHROMBIN TIME: CPT | Mod: QW

## 2018-02-01 PROCEDURE — 99207 ZZC NO CHARGE NURSE ONLY: CPT

## 2018-02-15 ENCOUNTER — ANTICOAGULATION THERAPY VISIT (OUTPATIENT)
Dept: NURSING | Facility: CLINIC | Age: 83
End: 2018-02-15
Payer: COMMERCIAL

## 2018-02-15 DIAGNOSIS — Z79.01 LONG TERM CURRENT USE OF ANTICOAGULANT THERAPY: ICD-10-CM

## 2018-02-15 DIAGNOSIS — I48.91 ATRIAL FIBRILLATION (H): ICD-10-CM

## 2018-02-15 LAB — INR POINT OF CARE: 2.5 (ref 0.86–1.14)

## 2018-02-15 PROCEDURE — 36416 COLLJ CAPILLARY BLOOD SPEC: CPT

## 2018-02-15 PROCEDURE — 99207 ZZC NO CHARGE NURSE ONLY: CPT

## 2018-02-15 PROCEDURE — 85610 PROTHROMBIN TIME: CPT | Mod: QW

## 2018-02-15 NOTE — MR AVS SNAPSHOT
Israel Moyer   2/15/2018 8:45 AM   Anticoagulation Therapy Visit    Description:  82 year old male   Provider:  NE ANTI EDDY   Department:  [facility]           INR as of 2/15/2018     Today's INR 2.5      Anticoagulation Summary as of 2/15/2018     INR goal 2.0-3.0   Today's INR 2.5   Full instructions 5 mg on Mon; 2.5 mg all other days   Next INR check 3/15/2018    Indications   Long term current use of anticoagulant therapy [Z79.01]  ATRIAL FIBRILLATION [I48.91]         Your next Anticoagulation Clinic appointment(s)     Feb 15, 2018  8:45 AM CST   [facility]    [address] [place] [id number] [phone]            Mar 15, 2018  8:45 AM CDT   [facility]agulation [facility] with NE ANTI COAG   [facility]    [address] [place] [id number] [phone]              Contact Numbers     Please call [phone] to cancel and/or reschedule your appointment.  Please call [phone] with any problems or questions regarding your therapy          February 2018 Details    Sun Mon Tue Wed Thu Fri Sat         1               2               3                 4               5               6               7               8               9               10                 11               12               13               14               15      2.5 mg   See details      16      2.5 mg         17      2.5 mg           18      2.5 mg         19      5 mg         20      2.5 mg         21      2.5 mg         22      2.5 mg         23      2.5 mg         24      2.5 mg           25      2.5 mg         26      5 mg         27      2.5 mg         28      2.5 mg             Date Details   02/15 This INR check               How to take your warfarin dose     To take:  2.5 mg Take 1 of the 2.5 mg tablets.    To take:  5 mg Take 2 of the 2.5 mg  tablets.           March 2018 Details    Sun Mon Tue Wed Thu Fri Sat         1      2.5 mg         2      2.5 mg         3      2.5 mg           4      2.5 mg         5      5 mg         6      2.5 mg         7      2.5 mg         8      2.5 mg         9      2.5 mg         10      2.5 mg           11      2.5 mg         12      5 mg         13      2.5 mg         14      2.5 mg         15            16               17                 18               19               20               21               22               23               24                 25               26               27               28               29               30               31                Date Details   No additional details    Date of next INR:  3/15/2018         How to take your warfarin dose     To take:  2.5 mg Take 1 of the 2.5 mg tablets.    To take:  5 mg Take 2 of the 2.5 mg tablets.

## 2018-02-15 NOTE — PROGRESS NOTES
ANTICOAGULATION FOLLOW-UP CLINIC VISIT    Patient Name:  Israel Moyer  Date:  2/15/2018  Contact Type:  Face to Face    SUBJECTIVE:     Patient Findings     Positives No Problem Findings           OBJECTIVE    INR Protime   Date Value Ref Range Status   02/15/2018 2.5 (A) 0.86 - 1.14 Final       ASSESSMENT / PLAN  INR assessment THER    Recheck INR In: 4 WEEKS    INR Location Clinic      Anticoagulation Summary as of 2/15/2018     INR goal 2.0-3.0   Today's INR 2.5   Maintenance plan 5 mg (2.5 mg x 2) on Mon; 2.5 mg (2.5 mg x 1) all other days   Full instructions 5 mg on Mon; 2.5 mg all other days   Weekly total 20 mg   No change documented Jamaica Christensen RN   Plan last modified Jamaica Christensen RN (2/1/2018)   Next INR check 3/15/2018   Target end date Indefinite    Indications   Long term current use of anticoagulant therapy [Z79.01]  ATRIAL FIBRILLATION [I48.91]         Anticoagulation Episode Summary     [facility] check [facility]    Preferred lab     Send INR reminders to [facility]    Comments             See the Encounter Report to view Anticoagulation Flowsheet and Dosing Calendar (Go to Encounters tab in chart review, and find the Anticoagulation Therapy Visit)        Jamaica Christensen, SIXTO

## 2018-03-15 ENCOUNTER — ANTICOAGULATION THERAPY VISIT (OUTPATIENT)
Dept: NURSING | Facility: CLINIC | Age: 83
End: 2018-03-15
Payer: COMMERCIAL

## 2018-03-15 DIAGNOSIS — Z79.01 LONG TERM CURRENT USE OF ANTICOAGULANT THERAPY: ICD-10-CM

## 2018-03-15 DIAGNOSIS — I48.91 ATRIAL FIBRILLATION (H): ICD-10-CM

## 2018-03-15 LAB — INR POINT OF CARE: 2.7 (ref 0.86–1.14)

## 2018-03-15 PROCEDURE — 85610 PROTHROMBIN TIME: CPT | Mod: QW

## 2018-03-15 PROCEDURE — 99207 ZZC NO CHARGE NURSE ONLY: CPT

## 2018-03-15 PROCEDURE — 36416 COLLJ CAPILLARY BLOOD SPEC: CPT

## 2018-03-15 NOTE — PROGRESS NOTES
ANTICOAGULATION FOLLOW-UP CLINIC VISIT    Patient Name:  Israel Moyer  Date:  3/15/2018  Contact Type:  Face to Face    SUBJECTIVE:     Patient Findings     Positives No Problem Findings           OBJECTIVE    INR Protime   Date Value Ref Range Status   03/15/2018 2.7 (A) 0.86 - 1.14 Final       ASSESSMENT / PLAN  INR assessment THER    Recheck INR In: 6 WEEKS    INR Location Clinic      Anticoagulation Summary as of 3/15/2018     INR goal 2.0-3.0   Today's INR 2.7   Maintenance plan 5 mg (2.5 mg x 2) on Mon; 2.5 mg (2.5 mg x 1) all other days   Full instructions 5 mg on Mon; 2.5 mg all other days   Weekly total 20 mg   No change documented Jamaica Christensen RN   Plan last modified Jamaica Christensen RN (2/1/2018)   Next INR check 5/3/2018   Target end date Indefinite    Indications   Long term current use of anticoagulant therapy [Z79.01]  ATRIAL FIBRILLATION [I48.91]         Anticoagulation Episode Summary     [facility] check [facility]    Preferred lab     Send INR reminders to [facility]    Comments             See the Encounter Report to view Anticoagulation Flowsheet and Dosing Calendar (Go to Encounters tab in chart review, and find the Anticoagulation Therapy Visit)        Jamaica Christensen, SIXTO

## 2018-03-15 NOTE — MR AVS SNAPSHOT
Israel Moyer   3/15/2018 8:45 AM   Anticoagulation Therapy Visit    Description:  82 year old male   Provider:  NE ANTI EDDY   Department:  [facility] Nurse           INR as of 3/15/2018     Today's INR 2.7      Anticoagulation Summary as of 3/15/2018     INR goal 2.0-3.0   Today's INR 2.7   Full instructions 5 mg on Mon; 2.5 mg all other days   Next INR check 5/3/2018    Indications   Long term current use of anticoagulant therapy [Z79.01]  ATRIAL FIBRILLATION [I48.91]         Your next Anticoagulation Clinic appointment(s)     May 03, 2018 11:00 AM CDT   Anticoagulation Visit with NE ANTI COAG   [facility]    [address] [place] [id number] [phone]              Contact Numbers     Please call [phone] to cancel and/or reschedule your appointment.  Please call [phone] with any problems or questions regarding your therapy          March 2018 Details    Sun Mon Tue Wed Thu Fri Sat         1               2               3                 4               5               6               7               8               9               10                 11               12               13               14               15      2.5 mg   See details      16      2.5 mg         17      2.5 mg           18      2.5 mg         19      5 mg         20      2.5 mg         21      2.5 mg         22      2.5 mg         23      2.5 mg         24      2.5 mg           25      2.5 mg         26      5 mg         27      2.5 mg         28      2.5 mg         29      2.5 mg         30      2.5 mg         31      2.5 mg          Date Details   03/15 This INR check               How to take your warfarin dose     To take:  2.5 mg Take 1 of the 2.5 mg tablets.    To take:  5 mg Take 2 of the 2.5 mg tablets.           April 2018 Details    Sun Mon Tue Wed Thu Fri Sat     1      2.5 mg         2      5 mg         3      2.5 mg         4      2.5 mg          5      2.5 mg         6      2.5 mg         7      2.5 mg           8      2.5 mg         9      5 mg         10      2.5 mg         11      2.5 mg         12      2.5 mg         13      2.5 mg         14      2.5 mg           15      2.5 mg         16      5 mg         17      2.5 mg         18      2.5 mg         19      2.5 mg         20      2.5 mg         21      2.5 mg           22      2.5 mg         23      5 mg         24      2.5 mg         25      2.5 mg         26      2.5 mg         27      2.5 mg         28      2.5 mg           29      2.5 mg         30      5 mg               Date Details   No additional details            How to take your warfarin dose     To take:  2.5 mg Take 1 of the 2.5 mg tablets.    To take:  5 mg Take 2 of the 2.5 mg tablets.           May 2018 Details    Sun Mon Tue Wed Thu Fri Sat       1      2.5 mg         2      2.5 mg         3            4               5                 6               7               8               9               10               11               12                 13               14               15               16               17               18               19                 20               21               22               23               24               25               26                 27               28               29               30               31                  Date Details   No additional details    Date of next INR:  5/3/2018         How to take your warfarin dose     To take:  2.5 mg Take 1 of the 2.5 mg tablets.

## 2018-04-10 DIAGNOSIS — M1A.09X1 IDIOPATHIC CHRONIC GOUT OF MULTIPLE SITES WITH TOPHUS: ICD-10-CM

## 2018-04-10 DIAGNOSIS — I10 HYPERTENSION GOAL BP (BLOOD PRESSURE) < 140/90: ICD-10-CM

## 2018-04-10 DIAGNOSIS — I48.20 CHRONIC ATRIAL FIBRILLATION (H): ICD-10-CM

## 2018-04-10 NOTE — TELEPHONE ENCOUNTER
"Requested Prescriptions   Pending Prescriptions Disp Refills     allopurinol (ZYLOPRIM) 300 MG tablet [Pharmacy Med Name: ALLOPURINOL 300MG   TAB]  Last Written Prescription Date:  4/6/2017  Last Fill Quantity: 90 tablet,  # refills: 3   Last office visit: 4/6/2017 with prescribing provider:  PETER Shukla   Future Office Visit:   Next 5 appointments (look out 90 days)     May 03, 2018 11:20 AM CDT   [facility]   [facility]    [address] [place] [phone]   [phone]                  90 tablet 3     Sig: TAKE ONE TABLET BY MOUTH ONCE DAILY    Gout Agents Protocol Failed    4/10/2018  4:12 PM       Failed - CBC on file in past 12 months    Recent Labs   Lab Test  04/06/17   1222   WBC  5.5   RBC  3.93*   HGB  12.4*   HCT  38.3*   PLT  184            Failed - ALT on file in past 12 months    Recent Labs   Lab Test  04/06/17   1222   ALT  21            Failed - Uric acid greater than or equal to 6 on file in past 12 months    No lab results found.  If level is 6mg/dL or greater, ok to refill one time and refer to provider.          Failed - Normal serum creatinine on file in the past 12 months    Recent Labs   Lab Test  04/06/17   1222   CR  1.12            Passed - Recent (12 mo) or future (30 days) visit within the authorizing provider's specialty    Patient had office visit in the last 12 months or has a visit in the next 30 days with authorizing provider or within the authorizing provider's specialty.  See \"Patient Info\" tab in inbasket, or \"Choose Columns\" in Meds & Orders section of the refill encounter.           Passed - Patient is age 18 or older                  metoprolol succinate (TOPROL-XL) 100 MG 24 hr tablet [Pharmacy Med Name: METOPROLOL ER 100MG TAB]  Last Written Prescription Date:  4/6/2017  Last Fill Quantity: 90 tablet,  # refills: 3   Last office visit: 4/6/2017 with prescribing provider:  " "PETER Shukla   Future Office Visit:   Next 5 appointments (look out 90 days)     May 03, 2018 11:20 AM CDT   Office Visit with Ry Shukla MD   [facility]    [address] [place] [phone]   [phone]               90 tablet 3     Sig: TAKE ONE TABLET BY MOUTH ONCE DAILY    Beta-Blockers Protocol Failed    4/10/2018  4:12 PM       Failed - Blood pressure under 140/90 in past 12 months    BP Readings from Last 3 Encounters:   04/06/17 128/72   04/06/16 110/62   12/28/15 102/60          Passed - Patient is age 6 or older       Passed - Recent (12 mo) or future (30 days) visit within the authorizing provider's specialty    Patient had office visit in the last 12 months or has a visit in the next 30 days with authorizing provider or within the authorizing provider's specialty.  See \"Patient Info\" tab in inbasket, or \"Choose Columns\" in Meds & Orders section of the refill encounter.              "

## 2018-04-11 RX ORDER — ALLOPURINOL 300 MG/1
TABLET ORAL
Qty: 30 TABLET | Refills: 0 | Status: SHIPPED | OUTPATIENT
Start: 2018-04-11 | End: 2018-05-03

## 2018-04-11 RX ORDER — METOPROLOL SUCCINATE 100 MG/1
TABLET, EXTENDED RELEASE ORAL
Qty: 30 TABLET | Refills: 0 | Status: SHIPPED | OUTPATIENT
Start: 2018-04-11 | End: 2018-05-03

## 2018-04-16 ENCOUNTER — TELEPHONE (OUTPATIENT)
Dept: FAMILY MEDICINE | Facility: CLINIC | Age: 83
End: 2018-04-16

## 2018-04-16 DIAGNOSIS — I10 HYPERTENSION GOAL BP (BLOOD PRESSURE) < 140/90: ICD-10-CM

## 2018-04-16 RX ORDER — FUROSEMIDE 40 MG
TABLET ORAL
Qty: 60 TABLET | Refills: 0 | Status: SHIPPED | OUTPATIENT
Start: 2018-04-16 | End: 2018-05-03

## 2018-04-16 NOTE — TELEPHONE ENCOUNTER
Was going to give cate, patient has appt in May. When signing, this popped up-route to PCP    Drug-Drug: furosemide and indomethacin  Diuretic and antihypertensive effects of Loop Diuretics may be decreased by Indomethacin & Derivatives. Sodium retention and hypervolemia may occur    Shun Guerrero RN

## 2018-04-16 NOTE — TELEPHONE ENCOUNTER
Order sent. Uses Indomethacin sparingly.    Please have him make a follow up appt.     Zechariah Shukla MD      Orders Placed This Encounter     furosemide (LASIX) 40 MG tablet     Sig: TAKE ONE TABLET BY MOUTH ONCE DAILY     Dispense:  60 tablet     Refill:  0

## 2018-04-16 NOTE — TELEPHONE ENCOUNTER
"Requested Prescriptions   Pending Prescriptions Disp Refills     furosemide (LASIX) 40 MG tablet [Pharmacy Med Name: FUROSEMIDE 40MG     TAB]  Last Written Prescription Date:  4/6/2017  Last Fill Quantity: 90 tablet,  # refills: 3   Last office visit: 4/6/2017 with prescribing provider:  PETER Shukla   Future Office Visit:   Next 5 appointments (look out 90 days)     May 03, 2018 11:20 AM CDT   Office Visit with Ry Shukla MD   [facility]    [address] [place] [id number] [phone]               90 tablet 3     Sig: TAKE ONE TABLET BY MOUTH ONCE DAILY    Diuretics (Including Combos) Protocol Failed    4/16/2018 11:22 AM       Failed - Blood pressure under 140/90 in past 12 months    BP Readings from Last 3 Encounters:   04/06/17 128/72   04/06/16 110/62   12/28/15 102/60          Failed - Normal serum creatinine on file in past 12 months    Recent Labs   Lab Test  04/06/17   1222   CR  1.12             Failed - Normal serum potassium on file in past 12 months    Recent Labs   Lab Test  04/06/17   1222   POTASSIUM  3.8          Failed - Normal serum sodium on file in past 12 months    Recent Labs   Lab Test  04/06/17   1222   NA  142             Passed - Recent (12 mo) or future (30 days) visit within the authorizing provider's specialty    Patient had office visit in the last 12 months or has a visit in the next 30 days with authorizing provider or within the authorizing provider's specialty.  See \"Patient Info\" tab in inbasket, or \"Choose Columns\" in Meds & Orders section of the refill encounter.           Passed - Patient is age 18 or older          "

## 2018-04-28 DIAGNOSIS — E78.5 HYPERLIPIDEMIA LDL GOAL <100: ICD-10-CM

## 2018-04-28 DIAGNOSIS — N18.30 CKD (CHRONIC KIDNEY DISEASE) STAGE 3, GFR 30-59 ML/MIN (H): ICD-10-CM

## 2018-04-30 RX ORDER — LISINOPRIL 10 MG/1
TABLET ORAL
Qty: 90 TABLET | Refills: 3 | Status: SHIPPED | OUTPATIENT
Start: 2018-04-30 | End: 2019-04-18

## 2018-04-30 RX ORDER — PRAVASTATIN SODIUM 80 MG/1
TABLET ORAL
Qty: 30 TABLET | Refills: 0 | Status: SHIPPED | OUTPATIENT
Start: 2018-04-30 | End: 2018-05-03

## 2018-04-30 NOTE — TELEPHONE ENCOUNTER
"Requested Prescriptions   Pending Prescriptions Disp Refills     lisinopril (PRINIVIL/ZESTRIL) 10 MG tablet [Pharmacy Med Name: LISINOPRIL 10MG  TAB]  Last Written Prescription Date:  4/6/2017  Last Fill Quantity: 90 tablet,  # refills: 3   Last office visit: 4/6/2017 with prescribing provider:  PETER Shukla   Future Office Visit:   Next 5 appointments (look out 90 days)     May 03, 2018 11:20 AM CDT   Office Visit with Ry Shukla MD   [facility] ([address] [phone]               90 tablet 3     Sig: TAKE ONE TABLET BY MOUTH ONCE DAILY    ACE Inhibitors (Including Combos) Protocol Failed    4/28/2018 11:22 AM       Failed - Blood pressure under 140/90 in past 12 months    BP Readings from Last 3 Encounters:   04/06/17 128/72   04/06/16 110/62   12/28/15 102/60          Failed - Normal serum creatinine on file in past 12 months    Recent Labs   Lab Test  04/06/17   1222   CR  1.12          Failed - Normal serum potassium on file in past 12 months    Recent Labs   Lab Test  04/06/17   1222   POTASSIUM  3.8          Passed - Recent (12 mo) or future (30 days) visit within the authorizing provider's specialty    Patient had office visit in the last 12 months or has a visit in the next 30 days with authorizing provider or within the authorizing provider's specialty.  See \"Patient Info\" tab in inbasket, or \"Choose Columns\" in Meds & Orders section of the refill encounter.           Passed - Patient is age 18 or older                  pravastatin (PRAVACHOL) 80 MG tablet [Pharmacy Med Name: PRAVASTATIN 80MG    TAB]  Last Written Prescription Date:  4/6/2017  Last Fill Quantity: 90 tablet,  # refills: 3   Last office visit: 4/6/2017 with prescribing provider:  PETER Shukla   Future Office Visit:   Next 5 appointments (look out 90 days)     May 03, 2018 11:20 AM CDT   Office Visit with Ry Shukla MD   [facility]" "[facility] ([facility])    [address] [facility] [id number] [phone]                  90 tablet 3     Sig: TAKE ONE TABLET BY MOUTH ONCE DAILY    Statins Protocol Failed    4/28/2018 11:22 AM       Failed - LDL on file in past 12 months    Recent Labs   Lab Test  04/06/17   1222   LDL  70            Passed - No abnormal creatine kinase in past 12 months    No lab results found.            Passed - Recent (12 mo) or future (30 days) visit within the authorizing provider's specialty    Patient had office visit in the last 12 months or has a visit in the next 30 days with authorizing provider or within the authorizing provider's specialty.  See \"Patient Info\" tab in inbasket, or \"Choose Columns\" in Meds & Orders section of the refill encounter.           Passed - Patient is age 18 or older          "

## 2018-04-30 NOTE — TELEPHONE ENCOUNTER
Lindsay given x1 today. Patient has appt scheduled for May 3rd.    Route to PCP for drug interaction below.    Drug-Drug: indomethacin and lisinopril  Antihypertensive effects of ACE inhibitors may be decreased by Indomethacin and Derivatives Also, nephrotoxicity associated with ACE inhibitors or Indomethacin and Derivatives may be increased by this drug combination.     Thanks!  Shun Guerrero RN

## 2018-05-01 NOTE — TELEPHONE ENCOUNTER
Has appointment. Ok to refill    Orders Placed This Encounter     pravastatin (PRAVACHOL) 80 MG tablet     Sig: TAKE ONE TABLET BY MOUTH ONCE DAILY     Dispense:  30 tablet     Refill:  0

## 2018-05-03 ENCOUNTER — ANTICOAGULATION THERAPY VISIT (OUTPATIENT)
Dept: NURSING | Facility: CLINIC | Age: 83
End: 2018-05-03
Payer: COMMERCIAL

## 2018-05-03 ENCOUNTER — OFFICE VISIT (OUTPATIENT)
Dept: FAMILY MEDICINE | Facility: CLINIC | Age: 83
End: 2018-05-03
Payer: COMMERCIAL

## 2018-05-03 VITALS
HEART RATE: 64 BPM | SYSTOLIC BLOOD PRESSURE: 102 MMHG | TEMPERATURE: 97.9 F | DIASTOLIC BLOOD PRESSURE: 60 MMHG | HEIGHT: 69 IN | BODY MASS INDEX: 36.43 KG/M2 | OXYGEN SATURATION: 98 % | WEIGHT: 246 LBS

## 2018-05-03 DIAGNOSIS — I48.20 CHRONIC ATRIAL FIBRILLATION (H): ICD-10-CM

## 2018-05-03 DIAGNOSIS — I10 HYPERTENSION GOAL BP (BLOOD PRESSURE) < 140/90: ICD-10-CM

## 2018-05-03 DIAGNOSIS — I50.42 CHRONIC COMBINED SYSTOLIC AND DIASTOLIC CONGESTIVE HEART FAILURE (H): ICD-10-CM

## 2018-05-03 DIAGNOSIS — N18.30 CKD (CHRONIC KIDNEY DISEASE) STAGE 3, GFR 30-59 ML/MIN (H): ICD-10-CM

## 2018-05-03 DIAGNOSIS — I48.91 ATRIAL FIBRILLATION (H): ICD-10-CM

## 2018-05-03 DIAGNOSIS — Z00.00 ROUTINE HISTORY AND PHYSICAL EXAMINATION OF ADULT: Primary | ICD-10-CM

## 2018-05-03 DIAGNOSIS — E78.5 HYPERLIPIDEMIA LDL GOAL <100: ICD-10-CM

## 2018-05-03 DIAGNOSIS — M1A.09X1 IDIOPATHIC CHRONIC GOUT OF MULTIPLE SITES WITH TOPHUS: ICD-10-CM

## 2018-05-03 DIAGNOSIS — E03.4 HYPOTHYROIDISM DUE TO ACQUIRED ATROPHY OF THYROID: ICD-10-CM

## 2018-05-03 DIAGNOSIS — Z79.01 LONG TERM CURRENT USE OF ANTICOAGULANT THERAPY: ICD-10-CM

## 2018-05-03 LAB
HGB BLD-MCNC: 12.6 G/DL (ref 13.3–17.7)
INR POINT OF CARE: 2.8 (ref 0.86–1.14)

## 2018-05-03 PROCEDURE — 84460 ALANINE AMINO (ALT) (SGPT): CPT | Performed by: FAMILY MEDICINE

## 2018-05-03 PROCEDURE — 82043 UR ALBUMIN QUANTITATIVE: CPT | Performed by: FAMILY MEDICINE

## 2018-05-03 PROCEDURE — 36416 COLLJ CAPILLARY BLOOD SPEC: CPT

## 2018-05-03 PROCEDURE — 85018 HEMOGLOBIN: CPT | Performed by: FAMILY MEDICINE

## 2018-05-03 PROCEDURE — 84439 ASSAY OF FREE THYROXINE: CPT | Performed by: FAMILY MEDICINE

## 2018-05-03 PROCEDURE — 80061 LIPID PANEL: CPT | Performed by: FAMILY MEDICINE

## 2018-05-03 PROCEDURE — 80048 BASIC METABOLIC PNL TOTAL CA: CPT | Performed by: FAMILY MEDICINE

## 2018-05-03 PROCEDURE — 99397 PER PM REEVAL EST PAT 65+ YR: CPT | Mod: 25 | Performed by: FAMILY MEDICINE

## 2018-05-03 PROCEDURE — 85610 PROTHROMBIN TIME: CPT | Mod: QW

## 2018-05-03 PROCEDURE — 84443 ASSAY THYROID STIM HORMONE: CPT | Performed by: FAMILY MEDICINE

## 2018-05-03 PROCEDURE — 11055 PARING/CUTG B9 HYPRKER LES 1: CPT | Mod: RT | Performed by: FAMILY MEDICINE

## 2018-05-03 RX ORDER — METOPROLOL SUCCINATE 100 MG/1
100 TABLET, EXTENDED RELEASE ORAL DAILY
Qty: 90 TABLET | Refills: 3 | Status: SHIPPED | OUTPATIENT
Start: 2018-05-03 | End: 2019-05-01

## 2018-05-03 RX ORDER — PRAVASTATIN SODIUM 80 MG/1
80 TABLET ORAL DAILY
Qty: 90 TABLET | Refills: 3 | Status: SHIPPED | OUTPATIENT
Start: 2018-05-03 | End: 2019-01-17 | Stop reason: ALTCHOICE

## 2018-05-03 RX ORDER — LEVOTHYROXINE SODIUM 50 UG/1
50 TABLET ORAL DAILY
Qty: 90 TABLET | Refills: 3 | Status: SHIPPED | OUTPATIENT
Start: 2018-05-03 | End: 2019-05-01

## 2018-05-03 RX ORDER — INDOMETHACIN 25 MG/1
25 CAPSULE ORAL 2 TIMES DAILY WITH MEALS
Qty: 42 CAPSULE | Refills: 1 | Status: CANCELLED | OUTPATIENT
Start: 2018-05-03

## 2018-05-03 RX ORDER — ALLOPURINOL 300 MG/1
1 TABLET ORAL DAILY
Qty: 90 TABLET | Refills: 3 | Status: SHIPPED | OUTPATIENT
Start: 2018-05-03 | End: 2019-05-01

## 2018-05-03 RX ORDER — FUROSEMIDE 40 MG
40 TABLET ORAL DAILY
Qty: 90 TABLET | Refills: 3 | Status: SHIPPED | OUTPATIENT
Start: 2018-05-03 | End: 2019-05-01

## 2018-05-03 NOTE — PROGRESS NOTES
ANTICOAGULATION FOLLOW-UP CLINIC VISIT    Patient Name:  Israel Moyer  Date:  5/3/2018  Contact Type:  Face to Face    SUBJECTIVE:     Patient Findings     Positives No Problem Findings           OBJECTIVE    INR Protime   Date Value Ref Range Status   05/03/2018 2.8 (A) 0.86 - 1.14 Final       ASSESSMENT / PLAN  INR assessment THER    Recheck INR In: 6 WEEKS    INR Location Clinic      Anticoagulation Summary as of 5/3/2018     INR goal 2.0-3.0   Today's INR 2.8   Maintenance plan 5 mg (2.5 mg x 2) on Mon; 2.5 mg (2.5 mg x 1) all other days   Full instructions 5 mg on Mon; 2.5 mg all other days   Weekly total 20 mg   No change documented Mel Rasmussen RN   Plan last modified Jamaica Christensen RN (2/1/2018)   Next INR check 5/24/2018   Target end date Indefinite    Indications   Long term current use of anticoagulant therapy [Z79.01]  ATRIAL FIBRILLATION [I48.91]         Anticoagulation Episode Summary     [facility] location [facility]    Preferred lab     Send INR reminders to [facility]    Comments             See the Encounter Report to view Anticoagulation Flowsheet and Dosing Calendar (Go to Encounters tab in chart review, and find the Anticoagulation Therapy Visit)        Mel Rasmussen RN

## 2018-05-03 NOTE — PROGRESS NOTES
"  SUBJECTIVE:   Israel Moyer is a 82 year old male who presents for Preventive Visit.      Are you in the first 12 months of your Medicare Part B coverage?  No    Healthy Habits:    Do you get at least three servings of calcium containing foods daily (dairy, green leafy vegetables, etc.)? Not sure, taking calcium and/or vitamin D supplement: no    Amount of exercise or daily activities, outside of work: 5 day(s) per week    Problems taking medications regularly No    Medication side effects: No    Have you had an eye exam in the past two years? no    Do you see a dentist twice per year? no    Do you have sleep apnea, excessive snoring or daytime drowsiness?no      Ability to successfully perform activities of daily living: Yes, no assistance needed    Home safety:  none identified     Hearing impairment: No    Fall risk:  Medical Reason(s) for Not Completing Screen:: Patient is now using walker        COGNITIVE SCREEN  1) Repeat 3 items (Banana, Sunrise, Chair)    2) Clock draw: NORMAL, patient has a difficult time holding pen  3) 3 item recall: Recalls 3 objects  Results: 3 items recalled: COGNITIVE IMPAIRMENT LESS LIKELY    Mini-CogTM Copyright S Michelle. Licensed by the author for use in [facility]; reprinted with permission (ora@[facility]). All rights reserved.        Foot problems. Endorses insidious onset of focal left heel pain without traumas or injuries. He also notes intermittent cramping of his left second toe. Primarily symptomatic at night when he's \"not doing anything\".  He has reduced feeling in his feet bilaterally, and tingling sensation when taking his shoes off. 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 He has some capsaicin at home he used to use for joint pains, but hasn't tried it for neuropathies. Has been applying extra virgin oil which seems to work.     Gout. Has been taking allopurinol 300 mg QD as instructed. He doesn't report any gout attacks, thus, hasn't had to use indomethacin. "     Patient denies shortness of breath, chest pain, diarrhea, constipation, abdominal pain, vomiting, headaches, lightheadedness, or other constitutional symptoms.     Health care directive. He has had conversations with the family, and notes that his daughter usually takes care of health-related decisions. However, he's not aware of any official documentation.       Past/recent records reviewed and discussed for -- immunizations.      Reviewed and updated as needed this visit by clinical staff  Tobacco  Allergies  Meds  Med Hx  Surg Hx  Fam Hx  Soc Hx        Reviewed and updated as needed this visit by Provider        Social History   Substance Use Topics     Smoking status: Never Smoker     Smokeless tobacco: Never Used     Alcohol use Yes      Comment: not much       If you drink alcohol do you typically have >3 drinks per day or >7 drinks per week? No                        Today's PHQ-2 Score:   PHQ-2 ( 1999 [employer]) 5/3/2018 12/28/2015   Q1: Little interest or pleasure in doing things 0 0   Q2: Feeling down, depressed or hopeless 0 0   PHQ-2 Score 0 0       Do you feel safe in your environment - Yes    Do you have a Health Care Directive?: No: Advance care planning was reviewed with patient; patient declined at this time.    Current providers sharing in care for this patient include:   Patient Care Team:  Ry Shukla MD as PCP - General (Family Practice)    The following health maintenance items are reviewed in Epic and correct as of today:  Health Maintenance   Topic Date Due     FALL RISK ASSESSMENT  04/29/2016     ADVANCE DIRECTIVE PLANNING Q5 YRS  09/26/2016     BMP Q6 MOS  10/06/2017     TSH Q1 YEAR  04/06/2018     ALT Q1 YR  04/06/2018     BMP Q1 YR  04/06/2018     HEMOGLOBIN Q1 YR  04/06/2018     LIPID MONITORING Q1 YEAR  04/06/2018     MICROALBUMIN Q1 YEAR  04/06/2018     CBC Q1 YR  04/06/2018     OP ANNUAL INR REFERRAL  03/09/2018     INFLUENZA VACCINE (Season Ended) 09/01/2018     HF  ACTION PLAN Q3 YR  04/06/2020     TETANUS IMMUNIZATION (SYSTEM ASSIGNED)  04/06/2027     PNEUMOCOCCAL  Completed     Labs reviewed in EPIC  BP Readings from Last 3 Encounters:   05/03/18 102/60   04/06/17 128/72   04/06/16 110/62    Wt Readings from Last 3 Encounters:   05/03/18 111.6 kg (246 lb)   04/06/17 108.9 kg (240 lb)   04/06/16 108.5 kg (239 lb 2 oz)                  Patient Active Problem List   Diagnosis     ATRIAL FIBRILLATION     Congestive heart failure (H)     Generalized osteoarthrosis, unspecified site     Anemia     Hypothyroidism     Anal fissure     VENTRAL HERNIA     Hyperlipidemia LDL goal <100     CKD (chronic kidney disease) stage 3, GFR 30-59 ml/min     Advanced directives, counseling/discussion     Gout     Hypertension goal BP (blood pressure) < 140/90     Long term current use of anticoagulant therapy     Hypothyroidism due to acquired atrophy of thyroid     Past Surgical History:   Procedure Laterality Date     C APPENDECTOMY       C TOTAL KNEE ARTHROPLASTY  2000    right     CARPAL TUNNEL RELEASE RT/LT  2006    RIGHT     HC COLONOSCOPY THRU STOMA, DIAGNOSTIC  1977    no symptoms; worked at [employer] at the time.     JOINT REPLACEMTN, KNEE RT/LT  ~2005    Joint Replacement knee LT       Social History   Substance Use Topics     Smoking status: Never Smoker     Smokeless tobacco: Never Used     Alcohol use Yes      Comment: not much     Family History   Problem Relation Age of Onset     Hypertension Mother      C.A.D. Father      DIABETES Father      Hypertension Father      Hypertension Sister      CEREBROVASCULAR DISEASE No family hx of      Cancer - colorectal No family hx of      Prostate Cancer No family hx of          Current Outpatient Prescriptions   Medication Sig Dispense Refill     allopurinol (ZYLOPRIM) 300 MG tablet TAKE ONE TABLET BY MOUTH ONCE DAILY 30 tablet 0     furosemide (LASIX) 40 MG tablet TAKE ONE TABLET BY MOUTH ONCE DAILY 60 tablet 0     levothyroxine  (SYNTHROID/LEVOTHROID) 50 MCG tablet Take 1 tablet (50 mcg) by mouth daily Overdue for office visit and labs 90 tablet 3     lisinopril (PRINIVIL/ZESTRIL) 10 MG tablet TAKE ONE TABLET BY MOUTH ONCE DAILY 90 tablet 3     metoprolol succinate (TOPROL-XL) 100 MG 24 hr tablet TAKE ONE TABLET BY MOUTH ONCE DAILY 30 tablet 0     pravastatin (PRAVACHOL) 80 MG tablet TAKE ONE TABLET BY MOUTH ONCE DAILY 30 tablet 0     warfarin (COUMADIN) 2.5 MG tablet TAKE TWO TABLETS (5 MG) BY MOUTH ON MONDAY, WEDNESDAY, AND FRIDAY, AND TAKE ONE TABLET (2.5 MG) ALL OTHER DAYS OF THE WEEK 180 tablet 1     Allergies   Allergen Reactions     Oxycodone      nausea     Simvastatin Cramps     Recent Labs   Lab Test  04/06/17   1222  04/06/16   1309  12/28/15   1450  04/29/15   1211  03/06/14   1508   07/20/11   0926   11/10/10   0948   A1C   --    --    --    --    --    --   5.7   --   5.8   LDL  70  88   --   97   --    < >  91   < >  122   HDL  36*  43   --   43   --    < >  41   < >  44   TRIG  179*  191*   --   152*   --    < >  127   < >  184*   ALT  21   --   21   --   25   < >  23   --   20   CR  1.12  1.29*  1.38*  1.17  1.15   < >  1.31*   < >  1.29*   GFRESTIMATED  63  54*  50*  60*  62   < >  54*   < >  55*   GFRESTBLACK  76  65  60*  73  75   < >  65   < >  66   POTASSIUM  3.8  4.9  4.7  4.2  4.4   < >  4.4   < >  4.8   TSH  3.01  4.79*   --   3.16  3.41   < >  4.65   --   5.47*    < > = values in this interval not displayed.            ROS:  Constitutional, HEENT, cardiovascular, pulmonary, GI, , musculoskeletal, neuro, skin, endocrine and psych systems are negative, except as otherwise noted.    This document serves as a record of the services and decisions personally performed and made by Zechariah Shukla MD. It was created on their behalf by Titus Pritchett, a trained medical scribe. The creation of this document is based the provider's statements to the medical scribe.  Titus Pritchett May 3, 2018 11:44 AM       OBJECTIVE:   /60  "(Cuff Size: Adult Large)  Pulse 64  Temp 97.9  F (36.6  C) (Oral)  Ht 1.759 m (5' 9.25\")  Wt 111.6 kg (246 lb)  SpO2 98%  BMI 36.07 kg/m2 Estimated body mass index is 35.19 kg/(m^2) as calculated from the following:    Height as of 4/6/17: 5' 9.25\" (1.759 m).    Weight as of 4/6/17: 240 lb (108.9 kg).  EXAM:   GENERAL: healthy, alert and no distress  EYES: Eyes grossly normal to inspection, PERRL and conjunctivae and sclerae normal  HENT: ear canals and TM's normal, nose and mouth without ulcers or lesions  NECK: no adenopathy, no asymmetry, masses, or scars and thyroid normal to palpation  RESP: lungs clear to auscultation - no rales, rhonchi or wheezes  CV: regular rate and rhythm, normal S1 S2, no S3 or S4, no murmur, click or rub, no peripheral edema and peripheral pulses strong  ABDOMEN: soft, nontender, no hepatosplenomegaly, no masses and bowel sounds normal  MS: no gross musculoskeletal defects noted -- Significant arthritic changes of hands, knees, and feet bilaterally due to gout  SKIN: no suspicious lesions or rashes  NEURO: Normal strength and tone, mentation intact and speech normal  PSYCH: mentation appears normal, affect normal/bright  LYMPH: no cervical, supraclavicular, axillary, or inguinal adenopathy  Foot exam: 2 cm callus on plantar surface of right foot, trimmed with 15 blade, no signs of underlying infection, no sensation to light touch bilaterally, significantly diminished pulses bilaterally    ASSESSMENT / PLAN:   (Z00.00) Routine history and physical examination of adult  (primary encounter diagnosis)  Comment: screening recommendation and lifestyle modifications related to health maintenance were reviewed. Immunizations up to date with the exception of his shingles vaccine. Advised patient to get new shingles vaccine, which he can get at any pharmacy. I recommended capsaicin cream for his neuropathies. Pt understands that cream should be handled with care given caustic nature of " ointment, and should avoid getting any in his eyes or other sensitive areas.   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  Comment: controlled  Plan: allopurinol (ZYLOPRIM) 300 MG tablet, Basic         metabolic panel        -medications refilled, continue present medications    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 due to acquired atrophy of thyroid  Comment: controlled  Plan: levothyroxine (SYNTHROID/LEVOTHROID) 50 MCG         tablet, TSH with free T4 reflex        -medications refilled, continue present medications    (I48.2) Chronic atrial fibrillation (H)  Comment: stable  Plan: metoprolol succinate (TOPROL-XL) 100 MG 24 hr         tablet, Basic metabolic panel        -medications refilled, continue present medications    (E78.5) Hyperlipidemia LDL goal <100  Comment: controlled  Plan: pravastatin (PRAVACHOL) 80 MG tablet, Lipid         panel reflex to direct LDL Fasting        -medications refilled, continue present medications    (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  I have verified the patient's ablity to prepare an advanced directive/make health care decisions.  Literature was provided to assist patient in preparing an advanced directive.    COUNSELING:  Reviewed preventive health counseling, as reflected in patient instructions       Regular exercise       Healthy diet/nutrition        Estimated body mass  "index is 35.19 kg/(m^2) as calculated from the following:    Height as of 4/6/17: 5' 9.25\" (1.759 m).    Weight as of 4/6/17: 240 lb (108.9 kg).  Weight management plan: Discussed healthy diet and exercise guidelines and patient will follow up in 6 months in clinic to re-evaluate.     reports that he has never smoked. He has never used smokeless tobacco.      Appropriate preventive services were discussed with this patient, including applicable screening as appropriate for cardiovascular disease, diabetes, osteopenia/osteoporosis, and glaucoma.  As appropriate for age/gender, discussed screening for colorectal cancer, prostate cancer, breast cancer, and cervical cancer. Checklist reviewing preventive services available has been given to the patient.    Reviewed patients plan of care and provided an AVS. The Basic Care Plan (routine screening as documented in Health Maintenance) for [place] meets the Care Plan requirement. This Care Plan has been established and reviewed with the Patient.    Counseling Resources:  ATP IV Guidelines  Pooled Cohorts Equation Calculator  Breast Cancer Risk Calculator  FRAX Risk Assessment  ICSI Preventive Guidelines  Dietary Guidelines for Americans, 2010  USDA's MyPlate  ASA Prophylaxis  Lung CA Screening    The information in this document, created by the medical scribe for me, accurately reflects the services I personally performed and the decisions made by me. I have reviewed and approved this document for accuracy prior to leaving the patient care area.    Ry Shukla MD, MD  [facility]  "

## 2018-05-03 NOTE — PATIENT INSTRUCTIONS
Preventive Health Recommendations:       Male Ages 65 and over    Yearly exam:             See your health care provider every year in order to  o   Review health changes.   o   Discuss preventive care.    o   Review your medicines if your doctor has prescribed any.    Talk with your health care provider about whether you should have a test to screen for prostate cancer (PSA).    Every 3 years, have a diabetes test (fasting glucose). If you are at risk for diabetes, you should have this test more often.    Every 5 years, have a cholesterol test. Have this test more often if you are at risk for high cholesterol or heart disease.     Every 10 years, have a colonoscopy. Or, have a yearly FIT test (stool test). These exams will check for colon cancer.    Talk to with your health care provider about screening for Abdominal Aortic Aneurysm if you have a family history of AAA or have a history of smoking.  Shots:     Get a flu shot each year.     Get a tetanus shot every 10 years.     Talk to your doctor about your pneumonia vaccines. There are now two you should receive - Pneumovax (PPSV 23) and Prevnar (PCV 13).    Talk to your doctor about a shingles vaccine.     Talk to your doctor about the hepatitis B vaccine.  Nutrition:     Eat at least 5 servings of fruits and vegetables each day.     Eat whole-grain bread, whole-wheat pasta and brown rice instead of white grains and rice.     Talk to your doctor about Calcium and Vitamin D.   Lifestyle    Exercise for at least 150 minutes a week (30 minutes a day, 5 days a week). This will help you control your weight and prevent disease.     Limit alcohol to one drink per day.     No smoking.     Wear sunscreen to prevent skin cancer.     See your dentist every six months for an exam and cleaning.     See your eye doctor every 1 to 2 years to screen for conditions such as glaucoma, macular degeneration and cataracts.    -consider new shingles vaccine, which you can get at our  pharmacy. Check to see if it's covered by your insurance.    -capsaicin for your neuropathies     Orders Placed This Encounter     Basic metabolic panel     Lipid panel reflex to direct LDL Fasting     Last Lab Result: LDL Cholesterol Calculated (mg/dL)       Date                     Value                 04/06/2017               70               ----------     Order Specific Question:   Perform labs while fasting or non-fasting?     Answer:   Fasting     TSH with free T4 reflex     Last Lab Result: TSH (mU/L)       Date                     Value                 04/06/2017               3.01             ----------     ALT     Last Lab Result: ALT (U/L)       Date                     Value                 04/06/2017               21               ----------     Hemoglobin     Last Lab Result: Hemoglobin (g/dL)       Date                     Value                 04/06/2017               12.4 (L)         ----------     Albumin Random Urine Quantitative with Creat Ratio     This test includes a Creatinine Ratio.  Do not order [id number] (Creatinine Random Urine)  Last Lab Result: Albumin Urine mg/g Cr (mg/g Cr)       Date                     Value                 04/06/2017                                 Unable to calculate due to low value  ----------  Creatinine Urine (mg/dL)       Date                     Value                 04/06/2017               42               ----------     Order Specific Question:   Includes     Answer:   with Creat Ratio     allopurinol (ZYLOPRIM) 300 MG tablet     Sig: Take 1 tablet (300 mg) by mouth daily     Dispense:  90 tablet     Refill:  3     furosemide (LASIX) 40 MG tablet     Sig: Take 1 tablet (40 mg) by mouth daily     Dispense:  90 tablet     Refill:  3     levothyroxine (SYNTHROID/LEVOTHROID) 50 MCG tablet     Sig: Take 1 tablet (50 mcg) by mouth daily     Dispense:  90 tablet     Refill:  3     metoprolol succinate (TOPROL-XL) 100 MG 24 hr tablet     Sig: Take 1 tablet (100  mg) by mouth daily     Dispense:  90 tablet     Refill:  3     pravastatin (PRAVACHOL) 80 MG tablet     Sig: Take 1 tablet (80 mg) by mouth daily     Dispense:  90 tablet     Refill:  3     Follow up 6 months for recheck

## 2018-05-03 NOTE — MR AVS SNAPSHOT
After Visit Summary   5/3/2018    Israel Moyer    MRN: [id number]           Patient Information     Date Of Birth          1935        Visit Information        Provider Department      5/3/2018 11:20 AM Ry Shukla MD [facility]        Today's Diagnoses     Routine history and physical examination of adult    -  1    Hypertension goal BP (blood pressure) < 140/90        Idiopathic chronic gout of multiple sites with tophus        Hypothyroidism due to acquired atrophy of thyroid        Chronic atrial fibrillation (H)        Hyperlipidemia LDL goal <100        Chronic combined systolic and diastolic congestive heart failure (H)        CKD (chronic kidney disease) stage 3, GFR 30-59 ml/min          Care Instructions      Preventive Health Recommendations:       Male Ages 65 and over    Yearly exam:             See your health care provider every year in order to  o   Review health changes.   o   Discuss preventive care.    o   Review your medicines if your doctor has prescribed any.    Talk with your health care provider about whether you should have a test to screen for prostate cancer (PSA).    Every 3 years, have a diabetes test (fasting glucose). If you are at risk for diabetes, you should have this test more often.    Every 5 years, have a cholesterol test. Have this test more often if you are at risk for high cholesterol or heart disease.     Every 10 years, have a colonoscopy. Or, have a yearly FIT test (stool test). These exams will check for colon cancer.    Talk to with your health care provider about screening for Abdominal Aortic Aneurysm if you have a family history of AAA or have a history of smoking.  Shots:     Get a flu shot each year.     Get a tetanus shot every 10 years.     Talk to your doctor about your pneumonia vaccines. There are now two you should receive - Pneumovax (PPSV 23) and Prevnar (PCV 13).    Talk to your doctor about a shingles  vaccine.     Talk to your doctor about the hepatitis B vaccine.  Nutrition:     Eat at least 5 servings of fruits and vegetables each day.     Eat whole-grain bread, whole-wheat pasta and brown rice instead of white grains and rice.     Talk to your doctor about Calcium and Vitamin D.   Lifestyle    Exercise for at least 150 minutes a week (30 minutes a day, 5 days a week). This will help you control your weight and prevent disease.     Limit alcohol to one drink per day.     No smoking.     Wear sunscreen to prevent skin cancer.     See your dentist every six months for an exam and cleaning.     See your eye doctor every 1 to 2 years to screen for conditions such as glaucoma, macular degeneration and cataracts.    -consider new shingles vaccine, which you can get at our pharmacy. Check to see if it's covered by your insurance.    -capsaicin for your neuropathies     Orders Placed This Encounter     Basic metabolic panel     Lipid panel reflex to direct LDL Fasting     Last Lab Result: LDL Cholesterol Calculated (mg/dL)       Date                     Value                 04/06/2017               70               ----------     Order Specific Question:   Perform labs while fasting or non-fasting?     Answer:   Fasting     TSH with free T4 reflex     Last Lab Result: TSH (mU/L)       Date                     Value                 04/06/2017               3.01             ----------     ALT     Last Lab Result: ALT (U/L)       Date                     Value                 04/06/2017               21               ----------     Hemoglobin     Last Lab Result: Hemoglobin (g/dL)       Date                     Value                 04/06/2017               12.4 (L)         ----------     Albumin Random Urine Quantitative with Creat Ratio     This test includes a Creatinine Ratio.  Do not order [id number] (Creatinine Random Urine)  Last Lab Result: Albumin Urine mg/g Cr (mg/g Cr)       Date                     Value                  04/06/2017                                 Unable to calculate due to low value  ----------  Creatinine Urine (mg/dL)       Date                     Value                 04/06/2017               42               ----------     Order Specific Question:   Includes     Answer:   with Creat Ratio     allopurinol (ZYLOPRIM) 300 MG tablet     Sig: Take 1 tablet (300 mg) by mouth daily     Dispense:  90 tablet     Refill:  3     furosemide (LASIX) 40 MG tablet     Sig: Take 1 tablet (40 mg) by mouth daily     Dispense:  90 tablet     Refill:  3     levothyroxine (SYNTHROID/LEVOTHROID) 50 MCG tablet     Sig: Take 1 tablet (50 mcg) by mouth daily     Dispense:  90 tablet     Refill:  3     metoprolol succinate (TOPROL-XL) 100 MG 24 hr tablet     Sig: Take 1 tablet (100 mg) by mouth daily     Dispense:  90 tablet     Refill:  3     pravastatin (PRAVACHOL) 80 MG tablet     Sig: Take 1 tablet (80 mg) by mouth daily     Dispense:  90 tablet     Refill:  3     Follow up 6 months for recheck          Follow-ups after your visit        Your next 10 appointments already scheduled     May 24, 2018  8:45 AM CDT   Anticoagulation Visit with NE ANTI COAG   [facility]    [address] [place] [id number] [phone]              Who to contact     If you have questions or need follow up information about today's clinic visit or your schedule please contact [facility] directly at [phone].  Normal or non-critical lab and imaging results will be communicated to you by MyChart, letter or phone within 4 business days after the clinic has received the results. If you do not hear from us within 7 days, please contact the clinic through MyChart or phone. If you have a critical or abnormal lab result, we will notify you by phone as soon as possible.  Submit refill requests through [facility] or call your pharmacy and they will forward the refill  "request to us. Please allow 3 business days for your refill to be completed.          Additional Information About Your Visit        Gura Gearhart Information     [facility] gives you secure access to your electronic health record. If you see a primary care provider, you can also send messages to your care team and make appointments. If you have questions, please call your primary care clinic.  If you do not have a primary care provider, please call [phone] and they will assist you.        Care EveryWhere ID     This is your Care EveryWhere ID. This could be used by other organizations to access your [facility] medical records  [id number]        Your Vitals Were     Pulse Temperature Height Pulse Oximetry BMI (Body Mass Index)       64 97.9  F (36.6  C) (Oral) 5' 9.25\" (1.759 m) 98% 36.07 kg/m2        Blood Pressure from Last 3 Encounters:   05/03/18 102/60   04/06/17 128/72   04/06/16 110/62    Weight from Last 3 Encounters:   05/03/18 246 lb (111.6 kg)   04/06/17 240 lb (108.9 kg)   04/06/16 239 lb 2 oz (108.5 kg)              We Performed the Following     Albumin Random Urine Quantitative with Creat Ratio     ALT     Basic metabolic panel     Hemoglobin     Lipid panel reflex to direct LDL Fasting     TSH with free T4 reflex          Today's Medication Changes          These changes are accurate as of 5/3/18 11:50 AM.  If you have any questions, ask your nurse or doctor.               These medicines have changed or have updated prescriptions.        Dose/Directions    allopurinol 300 MG tablet   Commonly known as:  ZYLOPRIM   This may have changed:  See the new instructions.   Used for:  Idiopathic chronic gout of multiple sites with tophus   Changed by:  Ry Shukla MD        Dose:  1 tablet   Take 1 tablet (300 mg) by mouth daily   Quantity:  90 tablet   Refills:  3       furosemide 40 MG tablet   Commonly known as:  LASIX   This may have changed:  See the new instructions.   Used for:  Hypertension " goal BP (blood pressure) < 140/90   Changed by:  Ry Shukla MD        Dose:  40 mg   Take 1 tablet (40 mg) by mouth daily   Quantity:  90 tablet   Refills:  3       levothyroxine 50 MCG tablet   Commonly known as:  SYNTHROID/LEVOTHROID   This may have changed:  additional instructions   Used for:  Hypothyroidism due to acquired atrophy of thyroid   Changed by:  Ry Shukla MD        Dose:  50 mcg   Take 1 tablet (50 mcg) by mouth daily   Quantity:  90 tablet   Refills:  3       metoprolol succinate 100 MG 24 hr tablet   Commonly known as:  TOPROL-XL   This may have changed:  See the new instructions.   Used for:  Chronic atrial fibrillation (H), Hypertension goal BP (blood pressure) < 140/90   Changed by:  Ry Shukla MD        Dose:  100 mg   Take 1 tablet (100 mg) by mouth daily   Quantity:  90 tablet   Refills:  3       pravastatin 80 MG tablet   Commonly known as:  PRAVACHOL   This may have changed:  See the new instructions.   Used for:  Hyperlipidemia LDL goal <100   Changed by:  Ry Shukla MD        Dose:  80 mg   Take 1 tablet (80 mg) by mouth daily   Quantity:  90 tablet   Refills:  3            Where to get your medicines      These medications were sent to [facility] [address] [place], [place] [address]     Phone:  [phone]     allopurinol 300 MG tablet    furosemide 40 MG tablet    levothyroxine 50 MCG tablet    metoprolol succinate 100 MG 24 hr tablet    pravastatin 80 MG tablet                Primary Care Provider Office Phone # Fax #    Ry Shukla [phone]       [facility] [address]        Equal Access to Services     [facility]: Hadii pilar walker hadasho Socodie, waaxda luqadaha, qaybta kaalzoe cope. So [facility] [phone].    ATENCIÓN: Si habla español, tiene a roldan disposición servicios  crista de asistencia lingüística. Maureen harris [phone].    We comply with applicable federal civil rights laws and [place] laws. We do not discriminate on the basis of race, color, national origin, age, disability, sex, sexual orientation, or gender identity.            Thank you!     Thank you for choosing [facility]  for your care. Our goal is always to provide you with excellent care. Hearing back from our patients is one way we can continue to improve our services. Please take a few minutes to complete the written survey that you may receive in the mail after your visit with us. Thank you!             Your Updated Medication List - Protect others around you: Learn how to safely use, store and throw away your medicines at www.disposemymeds.org.          This list is accurate as of 5/3/18 11:50 AM.  Always use your most recent med list.                   Brand Name Dispense Instructions for use Diagnosis    allopurinol 300 MG tablet    ZYLOPRIM    90 tablet    Take 1 tablet (300 mg) by mouth daily    Idiopathic chronic gout of multiple sites with tophus       furosemide 40 MG tablet    LASIX    90 tablet    Take 1 tablet (40 mg) by mouth daily    Hypertension goal BP (blood pressure) < 140/90       levothyroxine 50 MCG tablet    SYNTHROID/LEVOTHROID    90 tablet    Take 1 tablet (50 mcg) by mouth daily    Hypothyroidism due to acquired atrophy of thyroid       lisinopril 10 MG tablet    PRINIVIL/ZESTRIL    90 tablet    TAKE ONE TABLET BY MOUTH ONCE DAILY    CKD (chronic kidney disease) stage 3, GFR 30-59 ml/min       metoprolol succinate 100 MG 24 hr tablet    TOPROL-XL    90 tablet    Take 1 tablet (100 mg) by mouth daily    Chronic atrial fibrillation (H), Hypertension goal BP (blood pressure) < 140/90       pravastatin 80 MG tablet    PRAVACHOL    90 tablet    Take 1 tablet (80 mg) by mouth daily    Hyperlipidemia LDL goal <100       warfarin 2.5 MG tablet    COUMADIN    180 tablet     TAKE TWO TABLETS (5 MG) BY MOUTH ON MONDAY, WEDNESDAY, AND FRIDAY, AND TAKE ONE TABLET (2.5 MG) ALL OTHER DAYS OF THE WEEK    Long term current use of anticoagulant therapy, Chronic atrial fibrillation (H)

## 2018-05-03 NOTE — MR AVS SNAPSHOT
Israel Moyer   5/3/2018 11:00 AM   Anticoagulation Therapy Visit    Description:  82 year old male   Provider:  NE ANTI EDDY   Department:  Ne Nurse           INR as of 5/3/2018     Today's INR 2.8      Anticoagulation Summary as of 5/3/2018     INR goal 2.0-3.0   Today's INR 2.8   Full instructions 5 mg on Mon; 2.5 mg all other days   Next INR check 5/24/2018    Indications   Long term current use of anticoagulant therapy [Z79.01]  ATRIAL FIBRILLATION [I48.91]         Your next Anticoagulation Clinic appointment(s)     May 24, 2018  8:45 AM CDT   Anticoagulation Visit with NE ANTI COAG   [facility] ([facility])    [address] [place] [id number] [phone]              Contact Numbers     Please call [phone] to cancel and/or reschedule your appointment.  Please call [phone] with any problems or questions regarding your therapy          May 2018 Details    Sun Mon Tue Wed Thu Fri Sat       1               2               3      2.5 mg   See details      4      2.5 mg         5      2.5 mg           6      2.5 mg         7      5 mg         8      2.5 mg         9      2.5 mg         10      2.5 mg         11      2.5 mg         12      2.5 mg           13      2.5 mg         14      5 mg         15      2.5 mg         16      2.5 mg         17      2.5 mg         18      2.5 mg         19      2.5 mg           20      2.5 mg         21      5 mg         22      2.5 mg         23      2.5 mg         24            25               26                 27               28               29               30               31                  Date Details   05/03 This INR check       Date of next INR:  5/24/2018         How to take your warfarin dose     To take:  2.5 mg Take 1 of the 2.5 mg tablets.    To take:  5 mg Take 2 of the 2.5 mg tablets.

## 2018-05-04 LAB
ALT SERPL W P-5'-P-CCNC: 19 U/L (ref 0–70)
ANION GAP SERPL CALCULATED.3IONS-SCNC: 10 MMOL/L (ref 3–14)
BUN SERPL-MCNC: 25 MG/DL (ref 7–30)
CALCIUM SERPL-MCNC: 8.6 MG/DL (ref 8.5–10.1)
CHLORIDE SERPL-SCNC: 105 MMOL/L (ref 94–109)
CHOLEST SERPL-MCNC: 162 MG/DL
CO2 SERPL-SCNC: 23 MMOL/L (ref 20–32)
CREAT SERPL-MCNC: 1.21 MG/DL (ref 0.66–1.25)
CREAT UR-MCNC: 29 MG/DL
GFR SERPL CREATININE-BSD FRML MDRD: 58 ML/MIN/1.7M2
GLUCOSE SERPL-MCNC: 93 MG/DL (ref 70–99)
HDLC SERPL-MCNC: 38 MG/DL
LDLC SERPL CALC-MCNC: 90 MG/DL
MICROALBUMIN UR-MCNC: <5 MG/L
MICROALBUMIN/CREAT UR: NORMAL MG/G CR (ref 0–17)
NONHDLC SERPL-MCNC: 124 MG/DL
POTASSIUM SERPL-SCNC: 4.1 MMOL/L (ref 3.4–5.3)
SODIUM SERPL-SCNC: 138 MMOL/L (ref 133–144)
T4 FREE SERPL-MCNC: 0.78 NG/DL (ref 0.76–1.46)
TRIGL SERPL-MCNC: 169 MG/DL
TSH SERPL DL<=0.005 MIU/L-ACNC: 4.32 MU/L (ref 0.4–4)

## 2018-05-24 ENCOUNTER — ANTICOAGULATION THERAPY VISIT (OUTPATIENT)
Dept: NURSING | Facility: CLINIC | Age: 83
End: 2018-05-24
Payer: COMMERCIAL

## 2018-05-24 DIAGNOSIS — I48.91 ATRIAL FIBRILLATION (H): ICD-10-CM

## 2018-05-24 DIAGNOSIS — Z79.01 LONG TERM CURRENT USE OF ANTICOAGULANT THERAPY: ICD-10-CM

## 2018-05-24 LAB — INR POINT OF CARE: 2.6 (ref 0.86–1.14)

## 2018-05-24 PROCEDURE — 85610 PROTHROMBIN TIME: CPT | Mod: QW

## 2018-05-24 PROCEDURE — 99207 ZZC NO CHARGE NURSE ONLY: CPT

## 2018-05-24 PROCEDURE — 36416 COLLJ CAPILLARY BLOOD SPEC: CPT

## 2018-05-24 NOTE — PROGRESS NOTES
ANTICOAGULATION FOLLOW-UP CLINIC VISIT    Patient Name:  Israel Moyer  Date:  5/24/2018  Contact Type:  Face to Face    SUBJECTIVE:     Patient Findings     Positives No Problem Findings           OBJECTIVE    INR Protime   Date Value Ref Range Status   05/24/2018 2.6 (A) 0.86 - 1.14 Final       ASSESSMENT / PLAN  INR assessment THER    Recheck INR In: 6 WEEKS    INR Location Clinic      Anticoagulation Summary as of 5/24/2018     INR goal 2.0-3.0   Today's INR 2.6   Warfarin maintenance plan 5 mg (2.5 mg x 2) on Mon; 2.5 mg (2.5 mg x 1) all other days   Full warfarin instructions 5 mg on Mon; 2.5 mg all other days   Weekly warfarin total 20 mg   No change documented Jamaica Christensen RN   Plan last modified Jamaica Christensen RN (2/1/2018)   Next INR check 7/5/2018   Target end date Indefinite    Indications   Long term current use of anticoagulant therapy [Z79.01]  ATRIAL FIBRILLATION [I48.91]         Anticoagulation Episode Summary     [facility]R check [facility]    Preferred lab     Send INR reminders to [facility]    Comments             See the Encounter Report to view Anticoagulation Flowsheet and Dosing Calendar (Go to Encounters tab in chart review, and find the Anticoagulation Therapy Visit)        Jamaica Christensen, SIXTO

## 2018-05-24 NOTE — MR AVS SNAPSHOT
Israel Moyer   5/24/2018 8:45 AM   Anticoagulation Therapy Visit    Description:  82 year old male   Provider:  NE ANTI EDDY   Department:  Ne Nurse           INR as of 5/24/2018     Today's INR 2.6      Anticoagulation Summary as of 5/24/2018     INR goal 2.0-3.0   Today's INR 2.6   Full warfarin instructions 5 mg on Mon; 2.5 mg all other days   Next INR check 7/5/2018    Indications   Long term current use of anticoagulant therapy [Z79.01]  ATRIAL FIBRILLATION [I48.91]         Your next Anticoagulation Clinic appointment(s)     Jul 05, 2018  9:15 AM CDT   Anticoagulation Visit with NE ANTI COAG   [facility] ([facility])    [address] [place] [id number] [phone]              Contact Numbers     Please call [phone] to cancel and/or reschedule your appointment.  Please call [phone] with any problems or questions regarding your therapy          May 2018 Details    Sun Mon Tue Wed Thu Fri Sat       1               2               3               4               5                 6               7               8               9               10               11               12                 13               14               15               16               17               18               19                 20               21               22               23               24      2.5 mg   See details      25      2.5 mg         26      2.5 mg           27      2.5 mg         28      5 mg         29      2.5 mg         30      2.5 mg         31      2.5 mg            Date Details   05/24 This INR check               How to take your warfarin dose     To take:  2.5 mg Take 1 of the 2.5 mg tablets.    To take:  5 mg Take 2 of the 2.5 mg tablets.           June 2018 Details    Sun Mon Tue Wed Thu Fri Sat          1      2.5 mg         2      2.5 mg           3      2.5 mg         4      5 mg         5      2.5 mg         6      2.5 mg          7      2.5 mg         8      2.5 mg         9      2.5 mg           10      2.5 mg         11      5 mg         12      2.5 mg         13      2.5 mg         14      2.5 mg         15      2.5 mg         16      2.5 mg           17      2.5 mg         18      5 mg         19      2.5 mg         20      2.5 mg         21      2.5 mg         22      2.5 mg         23      2.5 mg           24      2.5 mg         25      5 mg         26      2.5 mg         27      2.5 mg         28      2.5 mg         29      2.5 mg         30      2.5 mg          Date Details   No additional details            How to take your warfarin dose     To take:  2.5 mg Take 1 of the 2.5 mg tablets.    To take:  5 mg Take 2 of the 2.5 mg tablets.           July 2018 Details    Sun Mon Tue Wed Thu Fri Sat     1      2.5 mg         2      5 mg         3      2.5 mg         4      2.5 mg         5            6               7                 8               9               10               11               12               13               14                 15               16               17               18               19               20               21                 22               23               24               25               26               27               28                 29               30               31                    Date Details   No additional details    Date of next INR:  7/5/2018         How to take your warfarin dose     To take:  2.5 mg Take 1 of the 2.5 mg tablets.    To take:  5 mg Take 2 of the 2.5 mg tablets.

## 2018-07-09 ENCOUNTER — ANTICOAGULATION THERAPY VISIT (OUTPATIENT)
Dept: NURSING | Facility: CLINIC | Age: 83
End: 2018-07-09
Payer: COMMERCIAL

## 2018-07-09 DIAGNOSIS — Z79.01 LONG TERM CURRENT USE OF ANTICOAGULANT THERAPY: ICD-10-CM

## 2018-07-09 DIAGNOSIS — I48.91 ATRIAL FIBRILLATION (H): ICD-10-CM

## 2018-07-09 LAB — INR POINT OF CARE: 1.7 (ref 0.86–1.14)

## 2018-07-09 PROCEDURE — 99207 ZZC NO CHARGE NURSE ONLY: CPT

## 2018-07-09 PROCEDURE — 36416 COLLJ CAPILLARY BLOOD SPEC: CPT

## 2018-07-09 PROCEDURE — 85610 PROTHROMBIN TIME: CPT | Mod: QW

## 2018-07-09 NOTE — PROGRESS NOTES
ANTICOAGULATION FOLLOW-UP CLINIC VISIT    Patient Name:  Israel Moyer  Date:  7/9/2018  Contact Type:  Face to Face    SUBJECTIVE:     Patient Findings     Positives No Problem Findings           OBJECTIVE    INR Protime   Date Value Ref Range Status   07/09/2018 1.7 (A) 0.86 - 1.14 Final       ASSESSMENT / PLAN  INR assessment SUB    Recheck INR In: 6 WEEKS    INR Location Clinic      Anticoagulation Summary as of 7/9/2018     INR goal 2.0-3.0   Today's INR 1.7!   Warfarin maintenance plan 5 mg (2.5 mg x 2) on Mon; 2.5 mg (2.5 mg x 1) all other days   Full warfarin instructions 5 mg on Mon; 2.5 mg all other days   Weekly warfarin total 20 mg   No change documented Mel Rasmussen RN   Plan last modified Jamaica Christensen RN (2/1/2018)   Next INR check 7/16/2018   Target end date Indefinite    Indications   Long term current use of anticoagulant therapy [Z79.01]  ATRIAL FIBRILLATION [I48.91]         Anticoagulation Episode Summary     IN[facility] check [facility]    Preferred lab     Send INR reminders to [facility]    Comments             See the Encounter Report to view Anticoagulation Flowsheet and Dosing Calendar (Go to Encounters tab in chart review, and find the Anticoagulation Therapy Visit)        Mel Rasmussen RN

## 2018-07-09 NOTE — MR AVS SNAPSHOT
Israel Moyer   7/9/2018 1:45 PM   Anticoagulation Therapy Visit    Description:  82 year old male   Provider:  NE ANTI EDDY   Department:  Ne Nurse           INR as of 7/9/2018     Today's INR 1.7!      Anticoagulation Summary as of 7/9/2018     INR goal 2.0-3.0   Today's INR 1.7!   Full warfarin instructions 5 mg on Mon; 2.5 mg all other days   Next INR check 7/16/2018    Indications   Long term current use of anticoagulant therapy [Z79.01]  ATRIAL FIBRILLATION [I48.91]         Your next Anticoagulation Clinic appointment(s)     Jul 09, 2018  1:45 PM CDT   Anticoagulation Visit with NE ANTI COAG   [facility]    [address] [place] [id number] [phone]            Aug 23, 2018  9:15 AM CDT   Anticoagulation Visit with NE ANTI COAG   [facility] ([address] [id number] [phone]              Contact Numbers     Please call [phone] to cancel and/or reschedule your appointment.  Please call [phone] with any problems or questions regarding your therapy          July 2018 Details    Sun Mon Tue Wed Thu Fri Sat     1               2               3               4               5               6               7                 8               9      5 mg   See details      10      2.5 mg         11      2.5 mg         12      2.5 mg         13      2.5 mg         14      2.5 mg           15      2.5 mg         16            17               18               19               20               21                 22               23               24               25               26               27               28                 29               30               31                    Date Details   07/09 This INR check       Date of next INR:  7/16/2018         How to take your warfarin dose     To take:  2.5 mg Take 1 of the 2.5 mg tablets.    To  take:  5 mg Take 2 of the 2.5 mg tablets.

## 2018-07-31 ENCOUNTER — TELEPHONE (OUTPATIENT)
Dept: FAMILY MEDICINE | Facility: CLINIC | Age: 83
End: 2018-07-31

## 2018-07-31 NOTE — TELEPHONE ENCOUNTER
Patient/family was instructed to return call to [facility] RN directly on the RN Call back line at [phone].     Holly Orantes RN

## 2018-07-31 NOTE — TELEPHONE ENCOUNTER
Monday appointment is OK  If you can get the results that would be good but not absolutely necessary  Zechariah Shukla MD

## 2018-07-31 NOTE — TELEPHONE ENCOUNTER
Reason for Call:  Other     Detailed comments:  [employer] company did a home assessment on 7/27/18.  They did a Quanti flow test for PAD / the test was abnormal.  [employer] is just notifying the PCP.    Phone Number Patient can be reached at: Other phone number:  Josy/ Dariel/ [phone]    Best Time: any    Can we leave a detailed message on this number? YES    Call taken on 7/31/2018 at 11:27 AM by Rut Cole

## 2018-07-31 NOTE — TELEPHONE ENCOUNTER
Route update to PCP. Spoke with patient and scheduled for Monday, 8/6/18 to review QuantaFlo results.   PCP had availability this Thursday, but patient did not want a late afternoon appointment. Do you need the actual screening results of the QuantaFlo test?  I contacted Dariel and am awaiting a return call.      RN reached out to Dariel via phone number below to request a copy of the results for PCP to review prior to appointment next week.  Representative states that he does not have access to this, will route a message through to RN.    Holly Orantes, RN

## 2018-07-31 NOTE — TELEPHONE ENCOUNTER
Call patient and have him follow up for review of testing done by [facility].   Zechariah Shukla MD

## 2018-07-31 NOTE — TELEPHONE ENCOUNTER
Provider response noted and will await return call from [facility].    Holly Orantes RN

## 2018-08-06 ENCOUNTER — OFFICE VISIT (OUTPATIENT)
Dept: FAMILY MEDICINE | Facility: CLINIC | Age: 83
End: 2018-08-06
Payer: COMMERCIAL

## 2018-08-06 VITALS
SYSTOLIC BLOOD PRESSURE: 94 MMHG | TEMPERATURE: 97.9 F | DIASTOLIC BLOOD PRESSURE: 64 MMHG | WEIGHT: 239.4 LBS | HEART RATE: 79 BPM | BODY MASS INDEX: 35.1 KG/M2

## 2018-08-06 DIAGNOSIS — I87.8 VENOUS STASIS: Primary | ICD-10-CM

## 2018-08-06 DIAGNOSIS — N47.1 PHIMOSIS: ICD-10-CM

## 2018-08-06 DIAGNOSIS — R42 VERTIGO: ICD-10-CM

## 2018-08-06 PROCEDURE — 99214 OFFICE O/P EST MOD 30 MIN: CPT | Performed by: FAMILY MEDICINE

## 2018-08-06 RX ORDER — MECLIZINE HYDROCHLORIDE 25 MG/1
25 TABLET ORAL 3 TIMES DAILY PRN
Qty: 30 TABLET | Refills: 1 | Status: SHIPPED | OUTPATIENT
Start: 2018-08-06 | End: 2019-05-01

## 2018-08-06 NOTE — PATIENT INSTRUCTIONS
Orders Placed This Encounter     meclizine (ANTIVERT) 25 MG tablet     Sig: Take 1 tablet (25 mg) by mouth 3 times daily as needed for dizziness     Dispense:  30 tablet     Refill:  1     [facility]   Discharged by : Janell VILLA CMA ([facility])    Paper scripts provided to patient : none    If you have any questions regarding to your visit please contact your care team:     Team Silver              Clinic Hours Telephone Number     Dr. Zechariah Hanson PA-C   7am-7pm  Monday - Thursday   7am-5pm  Fridays  [phone]   (Appointment scheduling available 24/7)     RN Line  [phone] option 2     Urgent Care - [place] - 11am-9pm Monday-Friday Saturday-Sunday- 9am-5pm     [place] -   5pm-9pm Monday-Friday Saturday-Sunday- 9am-5pm    [phone] - [place]    [phone] - [place]     For a Price Quote for your services, please call our Consumer Price Line at [phone].     What options do I have for visits at the clinic other than the traditional office visit?     To expand how we care for you, many of our providers are utilizing electronic visits (e-visits) and telephone visits, when medically appropriate, for interactions with their patients rather than a visit in the clinic. We also offer nurse visits for many medical concerns. Just like any other service, we will bill your insurance company for this type of visit based on time spent on the phone with your provider. Not all insurance companies cover these visits. Please check with your medical insurance if this type of visit is covered. You will be responsible for any charges that are not paid by your insurance.   E-visits via Catapult International: generally incur a $35.00 fee.     Telephone visits:   Time spent on the phone: *charged based on time that is spent on the phone in increments of 10 minutes. Estimated cost:   5-10 mins $30.00   11-20 mins. $59.00    21-30 mins. $85.00     Use Democracy.com (secure email communication and access to your chart) to send your primary care provider a message or make an appointment. Ask someone on your Team how to sign up for Democracy.com.     As always, Thank you for trusting us with your health care needs!      [facility]:    Scheduling Appointments  [facility]  Call: [phone]    [facility]  Call: [phone]    [facility]  Call: [phone]    For Gastroenterology referrals   [facility] Gastroenterology   Clinics and Surgery Center, 4th Floor   [address] [place] [address]   Appointments: [phone]    WHERE TO GO FOR CARE?  Clinic    Make an appointment if you:       Are sick (cold, cough, flu, sore throat, earache or in pain).       Have a small injury (sprain, small cut, burn or broken bone).       Need a physical exam, Pap smear, vaccine or prescription refill.       Have questions about your health or medicines.    To reach us:      Call [phone] ([phone]). Open 24 hours every day. (For counseling services, call [phone].)    Log into Democracy.com at [email or web address]. (Visit [email or web address] to create an account.) Hospital emergency room    An emergency is a serious or life- threatening problem that must be treated right away.    Call 956 or get to the hospital if you have:      Very bad or sudden:            - Chest pain or pressure         - Bleeding         - Head or belly pain         - Dizziness or trouble seeing, walking or                          Speaking      Problems breathing      Blood in your vomit or you are coughing up blood      A major injury (knocked out, loss of a finger or limb, rape, broken bone protruding from skin)    A mental health crisis. (Or call the Mental Health Crisis line at 1-602.180.6673 or Suicide Prevention Hotline at 1-483.100.8865.)    Open 24 hours every day. You  don't need an appointment.     Urgent care    Visit urgent care for sickness or small injuries when the clinic is closed. You don't need an appointment. To check hours or find an urgent care near you, visit [email or web address]. Online care    Get online care from [facility] for more than 70 common problems, like colds, allergies and infections. Open 24 hours every day at:   [email or web address]   Need help deciding?    For advice about where to be seen, you may call your clinic and ask to speak with a nurse. We're here for you 24 hours every day.         If you are deaf or hard of hearing, please let us know. We provide many free services including sign language interpreters, oral interpreters, TTYs, telephone amplifiers, note takers and written materials.

## 2018-08-06 NOTE — MR AVS SNAPSHOT
After Visit Summary   8/6/2018    Israel Moyer    MRN: [id number]           Patient Information     Date Of Birth          1935        Visit Information        Provider Department      8/6/2018 10:20 AM Ry Shukla MD [facility]        Today's Diagnoses     Vertigo    -  1    Phimosis          Care Instructions    Orders Placed This Encounter     meclizine (ANTIVERT) 25 MG tablet     Sig: Take 1 tablet (25 mg) by mouth 3 times daily as needed for dizziness     Dispense:  30 tablet     Refill:  1     [facility]   Discharged by : Janell VILLA CMA ([facility])    Paper scripts provided to patient : none    If you have any questions regarding to your visit please contact your care team:     Team Silver              Clinic Hours Telephone Number     Dr. Zechariah Hanson PA-C   7am-7pm  Monday - Thursday   7am-5pm  Fridays  [phone]   (Appointment scheduling available 24/7)     RN Line  [phone] option 2     [facility] [place] [facility] - 11am-9pm Monday-Friday Saturday-Sunday- 9am-5pm     [place] -   5pm-9pm Monday-Friday Saturday-Sunday- 9am-5pm    [phone] - [place]    [phone] - [place]     For a Price Quote for your services, please call our Consumer Price Line at [phone].     What options do I have for visits at the clinic other than the traditional office visit?     To expand how we care for you, many of our providers are utilizing electronic visits (e-visits) and telephone visits, when medically appropriate, for interactions with their patients rather than a visit in the clinic. We also offer nurse visits for many medical concerns. Just like any other service, we will bill your insurance company for this type of visit based on time spent on the phone with your provider. Not all insurance companies cover these visits. Please check with your  medical insurance if this type of visit is covered. You will be responsible for any charges that are not paid by your insurance.   E-visits via Savvy Cellar Wineshart: generally incur a $35.00 fee.     Telephone visits:   Time spent on the phone: *charged based on time that is spent on the phone in increments of 10 minutes. Estimated cost:   5-10 mins $30.00   11-20 mins. $59.00   21-30 mins. $85.00     Use Taamkru (secure email communication and access to your chart) to send your primary care provider a message or make an appointment. Ask someone on your Team how to sign up for Taamkru.     As always, Thank you for trusting us with your health care needs!      [facility]:    Scheduling Appointments  [place]  Call: [phone]    [facility]  Call: [phone]    [facility]  Call: [phone]    For Gastroenterology referrals   [facility] Gastroenterology   Clinics and Surgery Center, 4th Floor   [address] [place] [address]   Appointments: [phone]    WHERE TO GO FOR CARE?  Clinic    Make an appointment if you:       Are sick (cold, cough, flu, sore throat, earache or in pain).       Have a small injury (sprain, small cut, burn or broken bone).       Need a physical exam, Pap smear, vaccine or prescription refill.       Have questions about your health or medicines.    To reach us:      Call [phone] ([phone]). Open 24 hours every day. (For counseling services, call [phone].)    Log into Taamkru at Reevoo.org. (Visit [email or web address] to create an account.) Hospital emergency room    An emergency is a serious or life- threatening problem that must be treated right away.    Call 803 or get to the hospital if you have:      Very bad or sudden:            - Chest pain or pressure         - Bleeding         - Head or belly pain         - Dizziness or trouble seeing, walking or                           Speaking      Problems breathing      Blood in your vomit or you are coughing up blood      A major injury (knocked out, loss of a finger or limb, rape, broken bone protruding from skin)    A mental health crisis. (Or call the Mental Health Crisis line at 1-420.387.4868 or Suicide Prevention Hotline at 1-471.576.3731.)    Open 24 hours every day. You don't need an appointment.     Urgent care    Visit urgent care for sickness or small injuries when the clinic is closed. You don't need an appointment. To check hours or find an urgent care near you, visit [email or web address]. Online care    Get online care from [facility] for more than 70 common problems, like colds, allergies and infections. Open 24 hours every day at:   [email or web address]   Need help deciding?    For advice about where to be seen, you may call your clinic and ask to speak with a nurse. We're here for you 24 hours every day.         If you are deaf or hard of hearing, please let us know. We provide many free services including sign language interpreters, oral interpreters, TTYs, telephone amplifiers, note takers and written materials.               Follow-ups after your visit        Additional Services     UROLOGY ADULT REFERRAL       Your provider has referred you to: [facility]: [facility] [phone]   [email or web address]    Please be aware that coverage of these services is subject to the terms and limitations of your health insurance plan.  Call member services at your health plan with any benefit or coverage questions.      Please bring the following with you to your appointment:    (1) Any X-Rays, CTs or MRIs which have been performed.  Contact the facility where they were done to arrange for  prior to your scheduled appointment.    (2) List of current medications  (3) This referral request   (4) Any documents/labs given to you for this referral                  Your next 10  appointments already scheduled     Aug 23, 2018  9:15 AM CDT   [facility] with NE ANTI COAG   [facility]    [address] [facility] [address] [phone]              Who to contact     If you have questions or need follow up information about today's clinic visit or your schedule please contact [facility] directly at [phone].  Normal or non-critical lab and imaging results will be communicated to you by Next Generation Systemshart, letter or phone within 4 business days after the clinic has received the results. If you do not hear from us within 7 days, please contact the clinic through Alsyon Technologiest or phone. If you have a critical or abnormal lab result, we will notify you by phone as soon as possible.  Submit refill requests through Versus or call your pharmacy and they will forward the refill request to us. Please allow 3 business days for your refill to be completed.          Additional Information About Your Visit        Next Generation SystemsharNumerex Information     Versus gives you secure access to your electronic health record. If you see a primary care provider, you can also send messages to your care team and make appointments. If you have questions, please call your primary care clinic.  If you do not have a primary care provider, please call [phone] and they will assist you.        Care EveryWhere ID     This is your Care EveryWhere ID. This could be used by other organizations to access your [facility] medical records  [id number]        Your Vitals Were     Pulse Temperature BMI (Body Mass Index)             79 97.9  F (36.6  C) (Oral) 35.1 kg/m2          Blood Pressure from Last 3 Encounters:   08/06/18 94/64   05/03/18 102/60   04/06/17 128/72    Weight from Last 3 Encounters:   08/06/18 239 lb 6.4 oz (108.6 kg)   05/03/18 246 lb (111.6 kg)   04/06/17 240 lb (108.9 kg)              We Performed the Following     UROLOGY ADULT REFERRAL           Today's Medication Changes          These changes are accurate as of 8/6/18 11:13 AM.  If you have any questions, ask your nurse or doctor.               Start taking these medicines.        Dose/Directions    meclizine 25 MG tablet   Commonly known as:  ANTIVERT   Used for:  Vertigo   Started by:  Ry Shukla MD        Dose:  25 mg   Take 1 tablet (25 mg) by mouth 3 times daily as needed for dizziness   Quantity:  30 tablet   Refills:  1            Where to get your medicines      These medications were sent to [facility] [address] [place] [address] [address] [facility]  [address] [facility] [address]     Phone:  [phone]     meclizine 25 MG tablet                Primary Care Provider Office Phone # Fax #    Ry Shukla [phone]       [address] [place] [address]        Equal Access to Services     [facility]: Hadii pilar walker hadasho Soomaali, waaxda luqadaha, qaybta kaalmada adeegyada, zoe luxin haycayla tejada . So [facility] [place] [facility] [phone].    ATENCIÓN: Si habla español, tiene a roldan disposición servicios gratuitos de asistencia lingüística. Llame al [phone].    We comply with applicable federal civil rights laws and [place] laws. We do not discriminate on the basis of race, color, national origin, age, disability, sex, sexual orientation, or gender identity.            Thank you!     Thank you for choosing [facility]  for your care. Our goal is always to provide you with excellent care. Hearing back from our patients is one way we can continue to improve our services. Please take a few minutes to complete the written survey that you may receive in the mail after your visit with us. Thank you!             Your Updated Medication List - Protect others around you: Learn how to safely use, store and throw away your medicines at www.disposemymeds.org.          This list is accurate as of 8/6/18 11:13  AM.  Always use your most recent med list.                   Brand Name Dispense Instructions for use Diagnosis    allopurinol 300 MG tablet    ZYLOPRIM    90 tablet    Take 1 tablet (300 mg) by mouth daily    Idiopathic chronic gout of multiple sites with tophus       furosemide 40 MG tablet    LASIX    90 tablet    Take 1 tablet (40 mg) by mouth daily    Hypertension goal BP (blood pressure) < 140/90       levothyroxine 50 MCG tablet    SYNTHROID/LEVOTHROID    90 tablet    Take 1 tablet (50 mcg) by mouth daily    Hypothyroidism due to acquired atrophy of thyroid       lisinopril 10 MG tablet    PRINIVIL/ZESTRIL    90 tablet    TAKE ONE TABLET BY MOUTH ONCE DAILY    CKD (chronic kidney disease) stage 3, GFR 30-59 ml/min       meclizine 25 MG tablet    ANTIVERT    30 tablet    Take 1 tablet (25 mg) by mouth 3 times daily as needed for dizziness    Vertigo       metoprolol succinate 100 MG 24 hr tablet    TOPROL-XL    90 tablet    Take 1 tablet (100 mg) by mouth daily    Chronic atrial fibrillation (H), Hypertension goal BP (blood pressure) < 140/90       pravastatin 80 MG tablet    PRAVACHOL    90 tablet    Take 1 tablet (80 mg) by mouth daily    Hyperlipidemia LDL goal <100       warfarin 2.5 MG tablet    COUMADIN    180 tablet    TAKE TWO TABLETS (5 MG) BY MOUTH ON MONDAY, WEDNESDAY, AND FRIDAY, AND TAKE ONE TABLET (2.5 MG) ALL OTHER DAYS OF THE WEEK    Long term current use of anticoagulant therapy, Chronic atrial fibrillation (H)

## 2018-08-06 NOTE — PROGRESS NOTES
SUBJECTIVE:   Israel Moyer is a 82 year old male who presents to clinic today for the following health issues:      Detailed comments:  [employer] company did a home assessment on 7/27/18.  They did a Quanti flow test for PAD / the test was abnormal.  [employer] is just notifying the PCP  Patient is here for a follow up on results.        Problem list and histories reviewed & adjusted, as indicated.  Additional history: as documented    BP Readings from Last 3 Encounters:   08/06/18 94/64   05/03/18 102/60   04/06/17 128/72    Wt Readings from Last 3 Encounters:   08/06/18 239 lb 6.4 oz (108.6 kg)   05/03/18 246 lb (111.6 kg)   04/06/17 240 lb (108.9 kg)                    Reviewed and updated as needed this visit by clinical staff       Reviewed and updated as needed this visit by Provider         ROS:  CONSTITUTIONAL: NEGATIVE for fever, chills, change in weight  ENT/MOUTH: NEGATIVE for ear, mouth and throat problems  RESP: NEGATIVE for significant cough or SOB  CV: NEGATIVE for chest pain, palpitations or peripheral edema  CV: denies pain with walking. Walks 1 mile with dog  MUSCULOSKELETAL: chronic arthritic changes not related to PVD  : concerned about tightness of foreskin. No pain.   Neuro: chronic dizziness. State his sister used meclozine and it helps. Reviewed risk and side effects of medication.   OBJECTIVE:     BP 94/64 (BP Location: Right arm, Patient Position: Sitting, Cuff Size: Adult Large)  Pulse 79  Temp 97.9  F (36.6  C) (Oral)  Wt 239 lb 6.4 oz (108.6 kg)  BMI 35.1 kg/m2  Body mass index is 35.1 kg/(m^2).   GENERAL: alert and no distress  NECK: no adenopathy, no asymmetry, masses, or scars and thyroid normal to palpation  RESP: lungs clear to auscultation - no rales, rhonchi or wheezes  CV: regular rate and rhythm, normal S1 S2, no S3 or S4, no murmur, click or rub, no peripheral edema and peripheral pulsesdetected by doptone. Venous stasis changes noted and decreased sensation of  feet with out ulcerations  ABDOMEN: soft, nontender, no hepatosplenomegaly, no masses and bowel sounds normal   (male): testicles normal without atrophy or masses, no hernias, penis normal without urethral discharge and resricted foreskin noted. No inflammation. Non tender  MS: arthritis of multiple joints. ankles hands knees    Diagnostic Test Results:  none     ASSESSMENT:       PLAN:     (I87.8) Venous stasis  (primary encounter diagnosis)  Comment: no symptoms of claudication and detected pulse with doptone  Plan: monitor for changes. He checks feet daily for potential sores due to neuropathy    (R42) Vertigo  Comment:   Plan: meclizine (ANTIVERT) 25 MG tablet        Review[facility] risks of medication and [facility]    (N47.1) Phimosis  Comment:   Plan: UROLOGY ADULT REFERRAL                        Patient Instructions     Orders Placed This Encounter     meclizine (ANTIVERT) 25 MG tablet     Sig: Take 1 tablet (25 mg) by mouth 3 times daily as needed for dizziness     Dispense:  30 tablet     Refill:  1     [facility]   Discharged by : Janell VILLA CMA ([facility])    Paper scripts provided to patient : none    If you have any questions regarding to your visit please contact your care team:     Team Silver              Clinic Hours Telephone Number     Dr. Zechariah Hanson PA-C   7am-7pm  Monday - Thursday   7am-5pm  Fridays  [phone]   (Appointment scheduling available 24/7)     RN Line  [phone] option 2     Urgent Care - [place] - 11am-9pm Monday-Friday Saturday-Sunday- 9am-5pm     [place] -   5pm-9pm Monday-Friday Saturday-Sunday- 9am-5pm    [phone] - [place]    [phone] - [place]     For a Price Quote for your services, please call our Consumer Price Line at [phone].     What options do I have for visits at the clinic other than the traditional office visit?     To  expand how we care for you, many of our providers are utilizing electronic visits (e-visits) and telephone visits, when medically appropriate, for interactions with their patients rather than a visit in the clinic. We also offer nurse visits for many medical concerns. Just like any other service, we will bill your insurance company for this type of visit based on time spent on the phone with your provider. Not all insurance companies cover these visits. Please check with your medical insurance if this type of visit is covered. You will be responsible for any charges that are not paid by your insurance.   E-visits via Here On Bizhart: generally incur a $35.00 fee.     Telephone visits:   Time spent on the phone: *charged based on time that is spent on the phone in increments of 10 minutes. Estimated cost:   5-10 mins $30.00   11-20 mins. $59.00   21-30 mins. $85.00     Use VasoGenixt (secure email communication and access to your chart) to send your primary care provider a message or make an appointment. Ask someone on your Team how to sign up for VasoGenixt.     As always, Thank you for trusting us with your health care needs!      [facility]:    Scheduling Appointments  [place] [facility]  Call: [phone]    [facility]  Call: [phone]    [facility]  Call: [phone]    For Gastroenterology referrals   [facility] Gastroenterology   Clinics and Surgery Center, 4th Floor   [address] [place] [address]   Appointments: [phone]    WHERE TO GO FOR CARE?  Clinic    Make an appointment if you:       Are sick (cold, cough, flu, sore throat, earache or in pain).       Have a small injury (sprain, small cut, burn or broken bone).       Need a physical exam, Pap smear, vaccine or prescription refill.       Have questions about your health or medicines.    To reach us:      Call [phone] ([phone]). Open 24 hours every  day. (For counseling services, call [phone].)    Log into Yecuris at [email or web address]. (Visit [email or web address] to create an account.) Hospital emergency room    An emergency is a serious or life- threatening problem that must be treated right away.    Call 911 or get to the hospital if you have:      Very bad or sudden:            - Chest pain or pressure         - Bleeding         - Head or belly pain         - Dizziness or trouble seeing, walking or                          Speaking      Problems breathing      Blood in your vomit or you are coughing up blood      A major injury (knocked out, loss of a finger or limb, rape, broken bone protruding from skin)    A mental health crisis. (Or call the Mental Health Crisis line at 1-390.387.3849 or Suicide Prevention Hotline at 1-784.744.8918.)    Open 24 hours every day. You don't need an appointment.     Urgent care    Visit urgent care for sickness or small injuries when the clinic is closed. You don't need an appointment. To check hours or find an urgent care near you, visit [email or web address]. Online care    Get online care from [facility] for more than 70 common problems, like colds, allergies and infections. Open 24 hours every day at:   [email or web address]   Need help deciding?    For advice about where to be seen, you may call your clinic and ask to speak with a nurse. We're here for you 24 hours every day.         If you are deaf or hard of hearing, please let us know. We provide many free services including sign language interpreters, oral interpreters, TTYs, telephone amplifiers, note takers and written materials.           Ry Shukla MD, MD  [facility]

## 2018-08-09 ENCOUNTER — TELEPHONE (OUTPATIENT)
Dept: FAMILY MEDICINE | Facility: CLINIC | Age: 83
End: 2018-08-09

## 2018-08-09 NOTE — TELEPHONE ENCOUNTER
Patient seen in clinic. Unsure what the call was for. It may have been from the INR clinic so I will forward to them     Ok to close if no further contact needed    Zechariah Shukla MD

## 2018-08-09 NOTE — TELEPHONE ENCOUNTER
Reason for Call:  Other returning call    Detailed comments: Patient states that he just missed a call from us. Writer did not see any documentation of a call being made. Please call back if necessary    Phone Number Patient can be reached at: Home number on file [phone] (home)    Best Time: anytime    Can we leave a detailed message on this number? YES    Call taken on 8/9/2018 at 9:25 AM by Shun De La Garza

## 2018-08-09 NOTE — TELEPHONE ENCOUNTER
I also do not see record of anyone trying to reach this patient.  Will forward to PCP in case he was attempting to call patient.  Otherwise, okay to close encounter.    Devon Sánchez RN

## 2018-08-27 ENCOUNTER — ANTICOAGULATION THERAPY VISIT (OUTPATIENT)
Dept: NURSING | Facility: CLINIC | Age: 83
End: 2018-08-27
Payer: COMMERCIAL

## 2018-08-27 DIAGNOSIS — I48.91 ATRIAL FIBRILLATION (H): ICD-10-CM

## 2018-08-27 DIAGNOSIS — Z79.01 LONG TERM CURRENT USE OF ANTICOAGULANT THERAPY: ICD-10-CM

## 2018-08-27 LAB — INR POINT OF CARE: 3.1 (ref 0.86–1.14)

## 2018-08-27 PROCEDURE — 85610 PROTHROMBIN TIME: CPT | Mod: QW

## 2018-08-27 PROCEDURE — 99207 ZZC NO CHARGE NURSE ONLY: CPT

## 2018-08-27 PROCEDURE — 36416 COLLJ CAPILLARY BLOOD SPEC: CPT

## 2018-08-27 NOTE — PROGRESS NOTES
ANTICOAGULATION FOLLOW-UP CLINIC VISIT    Patient Name:  Israel Moyer  Date:  8/27/2018  Contact Type:  Face to Face    SUBJECTIVE:     Patient Findings     Positives No Problem Findings           OBJECTIVE    INR Protime   Date Value Ref Range Status   08/27/2018 3.1 (A) 0.86 - 1.14 Final       ASSESSMENT / PLAN  INR assessment THER    Recheck INR In: 4 WEEKS    INR Location Clinic      Anticoagulation Summary as of 8/27/2018     INR goal 2.0-3.0   Today's INR 3.1!   Warfarin maintenance plan 5 mg (2.5 mg x 2) on Mon; 2.5 mg (2.5 mg x 1) all other days   Full warfarin instructions 5 mg on Mon; 2.5 mg all other days   Weekly warfarin total 20 mg   No change documented Mel Rasmussen RN   Plan last modified Jamaica Christensen RN (2/1/2018)   Next INR check 9/27/2018   Target end date Indefinite    Indications   Long term current use of anticoagulant therapy [Z79.01]  ATRIAL FIBRILLATION [I48.91]         Anticoagulation Episode Summary     [facility] check [facility]    Preferred lab     Send [facility] reminders to [facility]    Comments             See the Encounter Report to view Anticoagulation Flowsheet and Dosing Calendar (Go to Encounters tab in chart review, and find the Anticoagulation Therapy Visit)    Dosage adjustment made based on physician directed care plan.    Mel Rasmussen RN

## 2018-08-27 NOTE — MR AVS SNAPSHOT
Israel Moyer   8/27/2018 10:45 AM   Anticoagulation Therapy Visit    Description:  82 year old male   Provider:  WESLY KAM   Department:  [facility]           INR as of 8/27/2018     Today's INR 3.1!      Anticoagulation Summary as of 8/27/2018     INR goal 2.0-3.0   Today's INR 3.1!   Full warfarin instructions 5 mg on Mon; 2.5 mg all other days   Next INR check 9/27/2018    Indications   Long term current use of anticoagulant therapy [Z79.01]  ATRIAL FIBRILLATION [I48.91]         Your next Anticoagulation Clinic appointment(s)     Sep 27, 2018  9:15 AM CDT   [facility]    [address] [place] [id number] [phone]              Contact Numbers     Please call [phone] to cancel and/or reschedule your appointment.  Please call [phone] with any problems or questions regarding your therapy          August 2018 Details    Sun Mon Tue Wed Thu Fri Sat        1               2               3               4                 5               6               7               8               9               10               11                 12               13               14               15               16               17               18                 19               20               21               22               23               24               25                 26               27      5 mg   See details      28      2.5 mg         29      2.5 mg         30      2.5 mg         31      2.5 mg           Date Details   08/27 This INR check               How to take your warfarin dose     To take:  2.5 mg Take 1 of the 2.5 mg tablets.    To take:  5 mg Take 2 of the 2.5 mg tablets.           September 2018 Details    Sun Mon Tue Wed Thu Fri Sat           1      2.5 mg           2      2.5 mg         3      5 mg         4      2.5 mg         5      2.5 mg         6      2.5 mg          7      2.5 mg         8      2.5 mg           9      2.5 mg         10      5 mg         11      2.5 mg         12      2.5 mg         13      2.5 mg         14      2.5 mg         15      2.5 mg           16      2.5 mg         17      5 mg         18      2.5 mg         19      2.5 mg         20      2.5 mg         21      2.5 mg         22      2.5 mg           23      2.5 mg         24      5 mg         25      2.5 mg         26      2.5 mg         27            28               29                 30                      Date Details   No additional details    Date of next INR:  9/27/2018         How to take your warfarin dose     To take:  2.5 mg Take 1 of the 2.5 mg tablets.    To take:  5 mg Take 2 of the 2.5 mg tablets.

## 2018-09-27 ENCOUNTER — ANTICOAGULATION THERAPY VISIT (OUTPATIENT)
Dept: NURSING | Facility: CLINIC | Age: 83
End: 2018-09-27
Payer: COMMERCIAL

## 2018-09-27 DIAGNOSIS — Z79.01 LONG TERM CURRENT USE OF ANTICOAGULANT THERAPY: ICD-10-CM

## 2018-09-27 DIAGNOSIS — I48.91 ATRIAL FIBRILLATION (H): ICD-10-CM

## 2018-09-27 LAB — INR POINT OF CARE: 2.6 (ref 0.86–1.14)

## 2018-09-27 PROCEDURE — 99207 ZZC NO CHARGE NURSE ONLY: CPT

## 2018-09-27 PROCEDURE — 85610 PROTHROMBIN TIME: CPT | Mod: QW

## 2018-09-27 PROCEDURE — 36416 COLLJ CAPILLARY BLOOD SPEC: CPT

## 2018-09-27 NOTE — PROGRESS NOTES
ANTICOAGULATION FOLLOW-UP CLINIC VISIT    Patient Name:  Israel Moyer  Date:  9/27/2018  Contact Type:  Face to Face    SUBJECTIVE:     Patient Findings     Positives No Problem Findings           OBJECTIVE    INR Protime   Date Value Ref Range Status   09/27/2018 2.6 (A) 0.86 - 1.14 Final       ASSESSMENT / PLAN  INR assessment THER    Recheck INR In: 4 WEEKS    INR Location Clinic      Anticoagulation Summary as of 9/27/2018     INR goal 2.0-3.0   Today's INR 2.6   Warfarin maintenance plan 5 mg (2.5 mg x 2) on Mon; 2.5 mg (2.5 mg x 1) all other days   Full warfarin instructions 5 mg on Mon; 2.5 mg all other days   Weekly warfarin total 20 mg   No change documented Jamaica Christensen RN   Plan last modified Jamaica Christensen RN (2/1/2018)   Next INR check 10/25/2018   Target end date Indefinite    Indications   Long term current use of anticoagulant therapy [Z79.01]  ATRIAL FIBRILLATION [I48.91]         Anticoagulation Episode Summary     [facility] check [facility]    Preferred lab     Send INR reminders to [facility]    Comments             See the Encounter Report to view Anticoagulation Flowsheet and Dosing Calendar (Go to Encounters tab in chart review, and find the Anticoagulation Therapy Visit)        Jamaica Christensen, SIXTO

## 2018-09-27 NOTE — MR AVS SNAPSHOT
Israel Moeyr   9/27/2018 9:15 AM   Anticoagulation Therapy Visit    Description:  82 year old male   Provider:  WESLY KAM   Department:  [facility]           INR as of 9/27/2018     Today's INR 2.6      Anticoagulation Summary as of 9/27/2018     INR goal 2.0-3.0   Today's INR 2.6   Full warfarin instructions 5 mg on Mon; 2.5 mg all other days   Next INR check 10/25/2018    Indications   Long term current use of anticoagulant therapy [Z79.01]  ATRIAL FIBRILLATION [I48.91]         Your next Anticoagulation Clinic appointment(s)     Oct 25, 2018 11:00 AM CDT   [facility]    [address] [place] [id number] [phone]              Contact Numbers     Please call [phone] to cancel and/or reschedule your appointment.  Please call [phone] with any problems or questions regarding your therapy          September 2018 Details    Sun Mon Tue Wed Thu Fri Sat           1                 2               3               4               5               6               7               8                 9               10               11               12               13               14               15                 16               17               18               19               20               21               22                 23               24               25               26               27      2.5 mg   See details      28      2.5 mg         29      2.5 mg           30      2.5 mg                Date Details   09/27 This INR check               How to take your warfarin dose     To take:  2.5 mg Take 1 of the 2.5 mg tablets.           October 2018 Details    Sun Mon Tue Wed Thu Fri Sat      1      5 mg         2      2.5 mg         3      2.5 mg         4      2.5 mg         5      2.5 mg         6      2.5 mg           7      2.5 mg         8      5 mg         9      2.5 mg          10      2.5 mg         11      2.5 mg         12      2.5 mg         13      2.5 mg           14      2.5 mg         15      5 mg         16      2.5 mg         17      2.5 mg         18      2.5 mg         19      2.5 mg         20      2.5 mg           21      2.5 mg         22      5 mg         23      2.5 mg         24      2.5 mg         25            26               27                 28               29               30               31                   Date Details   No additional details    Date of next INR:  10/25/2018         How to take your warfarin dose     To take:  2.5 mg Take 1 of the 2.5 mg tablets.    To take:  5 mg Take 2 of the 2.5 mg tablets.

## 2018-10-11 ENCOUNTER — OFFICE VISIT (OUTPATIENT)
Dept: FAMILY MEDICINE | Facility: CLINIC | Age: 83
End: 2018-10-11
Payer: COMMERCIAL

## 2018-10-11 VITALS
BODY MASS INDEX: 35.46 KG/M2 | DIASTOLIC BLOOD PRESSURE: 56 MMHG | HEART RATE: 60 BPM | WEIGHT: 239.4 LBS | HEIGHT: 69 IN | TEMPERATURE: 98.2 F | SYSTOLIC BLOOD PRESSURE: 124 MMHG

## 2018-10-11 DIAGNOSIS — N18.30 CKD (CHRONIC KIDNEY DISEASE) STAGE 3, GFR 30-59 ML/MIN (H): ICD-10-CM

## 2018-10-11 DIAGNOSIS — I48.20 CHRONIC ATRIAL FIBRILLATION (H): ICD-10-CM

## 2018-10-11 DIAGNOSIS — Z01.818 PREOP GENERAL PHYSICAL EXAM: Primary | ICD-10-CM

## 2018-10-11 DIAGNOSIS — Z79.01 LONG TERM CURRENT USE OF ANTICOAGULANT THERAPY: ICD-10-CM

## 2018-10-11 DIAGNOSIS — I50.9 CONGESTIVE HEART FAILURE, UNSPECIFIED HF CHRONICITY, UNSPECIFIED HEART FAILURE TYPE (H): ICD-10-CM

## 2018-10-11 DIAGNOSIS — I10 HYPERTENSION GOAL BP (BLOOD PRESSURE) < 140/90: ICD-10-CM

## 2018-10-11 PROCEDURE — 99207 ZZC FOR CODING REVIEW: CPT | Performed by: FAMILY MEDICINE

## 2018-10-11 PROCEDURE — 99215 OFFICE O/P EST HI 40 MIN: CPT | Performed by: FAMILY MEDICINE

## 2018-10-11 RX ORDER — WARFARIN SODIUM 2.5 MG/1
TABLET ORAL
Qty: 180 TABLET | Refills: 3 | Status: SHIPPED | OUTPATIENT
Start: 2018-10-11 | End: 2019-01-17 | Stop reason: ALTCHOICE

## 2018-10-11 NOTE — PATIENT INSTRUCTIONS
Before Your Surgery      Call your surgeon if there is any change in your health. This includes signs of a cold or flu (such as a sore throat, runny nose, cough, rash or fever).    Do not smoke, drink alcohol or take over the counter medicine (unless your surgeon or primary care doctor tells you to) for the 24 hours before and after surgery.    If you take prescribed drugs: Follow your doctor s orders about which medicines to take and which to stop until after surgery.    Eating and drinking prior to surgery: follow the instructions from your surgeon    Take a shower or bath the night before surgery. Use the soap your surgeon gave you to gently clean your skin. If you do not have soap from your surgeon, use your regular soap. Do not shave or scrub the surgery site.  Wear clean pajamas and have clean sheets on your bed.     Consider new shingles vaccine, which you can get at any pharmacy including ours. Pharmacist can check insurance coverage. This is a series of two vaccines.     -Hold lisinopril on the morning of surgery. You can resume medication after surgery. Okay to take rest of medications.     -Let us know if you need assistance with medications.

## 2018-10-11 NOTE — MR AVS SNAPSHOT
After Visit Summary   10/11/2018    Israel Moyer    MRN: [id number]           Patient Information     Date Of Birth          1935        Visit Information        Provider Department      10/11/2018 3:20 PM Ry Shukla MD [facility]        Today's Diagnoses     Preop general physical exam    -  1    CKD (chronic kidney disease) stage 3, GFR 30-59 ml/min (H)        Hypertension goal BP (blood pressure) < 140/90        Chronic atrial fibrillation (H)        Congestive heart failure, unspecified HF chronicity, unspecified heart failure type (H)        Long term current use of anticoagulant therapy          Care Instructions      Before Your Surgery      Call your surgeon if there is any change in your health. This includes signs of a cold or flu (such as a sore throat, runny nose, cough, rash or fever).    Do not smoke, drink alcohol or take over the counter medicine (unless your surgeon or primary care doctor tells you to) for the 24 hours before and after surgery.    If you take prescribed drugs: Follow your doctor s orders about which medicines to take and which to stop until after surgery.    Eating and drinking prior to surgery: follow the instructions from your surgeon    Take a shower or bath the night before surgery. Use the soap your surgeon gave you to gently clean your skin. If you do not have soap from your surgeon, use your regular soap. Do not shave or scrub the surgery site.  Wear clean pajamas and have clean sheets on your bed.     Consider new shingles vaccine, which you can get at any pharmacy including ours. Pharmacist can check insurance coverage. This is a series of two vaccines.     -Hold lisinopril on the morning of surgery. You can resume medication after surgery. Okay to take rest of medications.     -Let us know if you need assistance with medications.           Follow-ups after your visit        Your next 10 appointments already scheduled   "   Oct 25, 2018 11:00 AM CDT   [facility]   [facility]    [address] [id number] [phone]              Future tests that were ordered for you today     Open Future Orders        Priority Expected Expires Ordered    **CBC with platelets FUTURE anytime Routine 10/11/2018 10/11/2019 10/11/2018    **Basic metabolic panel FUTURE anytime Routine 10/11/2018 10/11/2019 10/11/2018            Who to contact     If you have questions or need follow up information about today's clinic visit or your schedule please contact [facility] directly at [phone].  Normal or non-critical lab and imaging results will be communicated to you by [employer], letter or phone within 4 business days after the clinic has received the results. If you do not hear from us within 7 days, please contact the clinic through [facility] or phone. If you have a critical or abnormal lab result, we will notify you by phone as soon as possible.  Submit refill requests through [facility] or call your pharmacy and they will forward the refill request to us. Please allow 3 business days for your refill to be completed.          Additional Information About Your Visit        [facility] Information     [facility] gives you secure access to your electronic health record. If you see a primary care provider, you can also send messages to your care team and make appointments. If you have questions, please call your primary care clinic.  If you do not have a primary care provider, please call [phone] and they will assist you.        Care EveryWhere ID     This is your Care EveryWhere ID. This could be used by other organizations to access your [facility] medical records  [id number]        Your Vitals Were     Pulse Temperature Height BMI (Body Mass Index)          60 98.2  F (36.8  C) (Oral) 5' 9.25\" (1.759 m) 35.1 kg/m2         Blood Pressure " from Last 3 Encounters:   10/11/18 124/56   08/06/18 94/64   05/03/18 102/60    Weight from Last 3 Encounters:   10/11/18 239 lb 6.4 oz (108.6 kg)   08/06/18 239 lb 6.4 oz (108.6 kg)   05/03/18 246 lb (111.6 kg)                 Where to get your medicines      These medications were sent to [facility]  [place], [place] - [address]  [address] [facility] [place] [address]     Phone:  [phone]     warfarin 2.5 MG tablet          Primary Care Provider Office Phone # Fax #    Ry Shukla [phone]       [address] [place] [address]        Equal Access to Services     SEAN HAN : Hadii pilar walker hadasho Soomaali, waaxda luqadaha, qaybta kaalmada adeegyada, zoe tejada . So [facility] [phone].    ATENCIÓN: Si habla español, tiene a roldan disposición servicios gratuitos de asistencia lingüística. Llame al [phone].    We comply with applicable federal civil rights laws and [place] laws. We do not discriminate on the basis of race, color, national origin, age, disability, sex, sexual orientation, or gender identity.            Thank you!     Thank you for choosing [facility]  for your care. Our goal is always to provide you with excellent care. Hearing back from our patients is one way we can continue to improve our services. Please take a few minutes to complete the written survey that you may receive in the mail after your visit with us. Thank you!             Your Updated Medication List - Protect others around you: Learn how to safely use, store and throw away your medicines at www.disposemymeds.org.          This list is accurate as of 10/11/18  3:54 PM.  Always use your most recent med list.                   Brand Name Dispense Instructions for use Diagnosis    allopurinol 300 MG tablet    ZYLOPRIM    90 tablet    Take 1 tablet (300 mg) by mouth daily    Idiopathic chronic gout of multiple sites  with tophus       furosemide 40 MG tablet    LASIX    90 tablet    Take 1 tablet (40 mg) by mouth daily    Hypertension goal BP (blood pressure) < 140/90       levothyroxine 50 MCG tablet    SYNTHROID/LEVOTHROID    90 tablet    Take 1 tablet (50 mcg) by mouth daily    Hypothyroidism due to acquired atrophy of thyroid       lisinopril 10 MG tablet    PRINIVIL/ZESTRIL    90 tablet    TAKE ONE TABLET BY MOUTH ONCE DAILY    CKD (chronic kidney disease) stage 3, GFR 30-59 ml/min (H)       meclizine 25 MG tablet    ANTIVERT    30 tablet    Take 1 tablet (25 mg) by mouth 3 times daily as needed for dizziness    Vertigo       metoprolol succinate 100 MG 24 hr tablet    TOPROL-XL    90 tablet    Take 1 tablet (100 mg) by mouth daily    Chronic atrial fibrillation (H), Hypertension goal BP (blood pressure) < 140/90       pravastatin 80 MG tablet    PRAVACHOL    90 tablet    Take 1 tablet (80 mg) by mouth daily    Hyperlipidemia LDL goal <100       warfarin 2.5 MG tablet    COUMADIN    180 tablet    TAKE TWO TABLETS (5 MG) BY MOUTH ON MONDAY, WEDNESDAY, AND FRIDAY, AND TAKE ONE TABLET (2.5 MG) ALL OTHER DAYS OF THE WEEK    Long term current use of anticoagulant therapy, Chronic atrial fibrillation (H)

## 2018-10-11 NOTE — PROGRESS NOTES
[address] [id number] [phone]  Dept: [phone]    PRE-OP EVALUATION:  Today's date: 10/11/2018    Israel Moyer (: 1935) presents for pre-operative evaluation assessment as requested by Dr. Gilbert.  He requires evaluation and anesthesia risk assessment prior to undergoing surgery/procedure for treatment of Cataracts .    Fax number for surgical facility: [phone]  Primary Physician: Ry Shukla  Type of Anesthesia Anticipated: to be determined    Patient has a Health Care Directive or Living Will:  NO    Preop Questions 10/11/2018   Who is doing your surgery? dr gilbert   What are you having done? catarac surgerthy   Date of Surgery/Procedure: 10 15 18   Facility or Hospital where procedure/surgery will be performed: [facility]   1.  Do you have a history of Heart attack, stroke, stent, coronary bypass surgery, or other heart surgery? No   2.  Do you ever have any pain or discomfort in your chest? No   3.  Do you have a history of  Heart Failure? YES - CHF in 2004, no recent hospitalizations, overall stable   4.   Are you troubled by shortness of breath when:  walking on a level surface, or up a slight hill, or at night? No   5.  Do you currently have a cold, bronchitis or other respiratory infection? No   6.  Do you have a cough, shortness of breath, or wheezing? No   7.  Do you sometimes get pains in the calves of your legs when you walk? No   8. Do you or anyone in your family have previous history of blood clots? No   9.  Do you or does anyone in your family have a serious bleeding problem such as prolonged bleeding following surgeries or cuts? No   10. Have you ever had problems with anemia or been told to take iron pills? No   11. Have you had any abnormal blood loss such as black, tarry or bloody stools? No   12. Have you ever had a blood transfusion? No   13. Have you or any of your relatives ever had problems  with anesthesia? No   14. Do you have sleep apnea, excessive snoring or daytime drowsiness? No   15. Do you have any prosthetic heart valves? No   16. Do you have prosthetic joints? YES - total right knee arthroplasty         HPI:     HPI related to upcoming procedure: progressive loss of visual acuity. Symptoms include difficulty seeing in the night and seeing halos around lights.     Past/recent records reviewed and discussed for -- immunizations. Recommended shingrix vaccine - patient to investigate coverage.    See problem list for active medical problems.  Problems all longstanding and stable, except as noted/documented.  See ROS for pertinent symptoms related to these conditions.                                                                                                                                                          .    MEDICAL HISTORY:     Patient Active Problem List    Diagnosis Date Noted     Advanced directives, counseling/discussion 09/26/2011     Priority: High     Advance Directive Problem List Overview:   Name Relationship Phone    Primary Health Care Agent            Alternative Health Care Agent          Discussed advance care planning with patient; information given to patient to review. 9/26/2011          Hypothyroidism due to acquired atrophy of thyroid 04/06/2016     Priority: Medium     Long term current use of anticoagulant therapy 08/06/2015     Priority: Medium     Problem list name updated by automated process. Provider to review       Hypertension goal BP (blood pressure) < 140/90 04/29/2015     Priority: Medium     Gout 10/20/2011     Priority: Medium     CKD (chronic kidney disease) stage 3, GFR 30-59 ml/min (H) 02/28/2011     Priority: Medium     Hyperlipidemia LDL goal <100 02/10/2010     Priority: Medium     VENTRAL HERNIA 01/04/2010     Priority: Medium     large; reducible; otherwise asymptomatic       Anal fissure 11/30/2007     Priority: Medium     Hypothyroidism  12/29/2005     Priority: Medium     Problem list name updated by automated process. Provider to review       Anemia 02/17/2005     Priority: Medium     Problem list name updated by automated process. Provider to review       Generalized osteoarthrosis, unspecified site 01/27/2005     Priority: Medium     ATRIAL FIBRILLATION 06/17/2004     Priority: Medium     medically managed       Congestive heart failure (H) 06/17/2004     Priority: Medium     Problem list name updated by automated process. Provider to review        Past Medical History:   Diagnosis Date     Atrial fibrillation (H) 2004    medically managed     Congestive heart failure, unspecified 2004    Dr. Wolfe     Essential hypertension, benign      Generalized osteoarthrosis, unspecified site      Gout, unspecified     followed by Dr. Lomax - [facility]     Pure hypercholesterolemia ~2004     Unspecified disorder resulting from impaired renal function      Unspecified hypothyroidism ~2004     Past Surgical History:   Procedure Laterality Date     C APPENDECTOMY       C TOTAL KNEE ARTHROPLASTY  2000    right     CARPAL TUNNEL RELEASE RT/LT  2006    RIGHT     HC COLONOSCOPY THRU STOMA, DIAGNOSTIC  1977    no symptoms; worked at [employer] at the time.     JOINT REPLACEMTN, KNEE RT/LT  ~2005    Joint Replacement knee LT     Current Outpatient Prescriptions   Medication Sig Dispense Refill     allopurinol (ZYLOPRIM) 300 MG tablet Take 1 tablet (300 mg) by mouth daily 90 tablet 3     furosemide (LASIX) 40 MG tablet Take 1 tablet (40 mg) by mouth daily 90 tablet 3     levothyroxine (SYNTHROID/LEVOTHROID) 50 MCG tablet Take 1 tablet (50 mcg) by mouth daily 90 tablet 3     lisinopril (PRINIVIL/ZESTRIL) 10 MG tablet TAKE ONE TABLET BY MOUTH ONCE DAILY 90 tablet 3     meclizine (ANTIVERT) 25 MG tablet Take 1 tablet (25 mg) by mouth 3 times daily as needed for dizziness 30 tablet 1     metoprolol succinate (TOPROL-XL) 100 MG 24 hr tablet Take 1 tablet (100 mg) by  "mouth daily 90 tablet 3     pravastatin (PRAVACHOL) 80 MG tablet Take 1 tablet (80 mg) by mouth daily 90 tablet 3     warfarin (COUMADIN) 2.5 MG tablet TAKE TWO TABLETS (5 MG) BY MOUTH ON MONDAY, WEDNESDAY, AND FRIDAY, AND TAKE ONE TABLET (2.5 MG) ALL OTHER DAYS OF THE WEEK 180 tablet 1     OTC products: None, except as noted above    Allergies   Allergen Reactions     Oxycodone      nausea     Simvastatin Cramps      Latex Allergy: NO    Social History   Substance Use Topics     Smoking status: Never Smoker     Smokeless tobacco: Never Used     Alcohol use Yes      Comment: not much     History   Drug Use No       REVIEW OF SYSTEMS:   Constitutional, neuro, ENT, endocrine, pulmonary, cardiac, gastrointestinal, genitourinary, musculoskeletal, integument and psychiatric systems are negative, except as otherwise noted.    This document serves as a record of the services and decisions personally performed and made by Zechariah Shukla MD. It was created on their behalf by Titus Pritchett, a trained medical scribe. The creation of this document is based the provider's statements to the medical scribe.  Titus Pritchett October 11, 2018 3:38 PM       EXAM:   /56 (BP Location: Right arm, Patient Position: Chair, Cuff Size: Adult Large)  Pulse 60  Temp 98.2  F (36.8  C) (Oral)  Ht 1.759 m (5' 9.25\")  Wt 108.6 kg (239 lb 6.4 oz)  BMI 35.1 kg/m2    GENERAL APPEARANCE: healthy, alert and no distress     EYES: EOMI,  PERRL     HENT: ear canals and TM's normal and nose and mouth without ulcers or lesions     NECK: no adenopathy, no asymmetry, masses, or scars and thyroid normal to palpation     RESP: lungs clear to auscultation - no rales, rhonchi or wheezes     CV: regular rates and rhythm, normal S1 S2, no S3 or S4 and no murmur, click or rub     ABDOMEN:  soft, nontender, no HSM or masses and bowel sounds normal     MS: extremities normal- no gross deformities noted, no evidence of inflammation in joints, FROM in all " extremities.     SKIN: no suspicious lesions or rashes     NEURO: Normal strength and tone, sensory exam grossly normal, mentation intact and speech normal     PSYCH: mentation appears normal. and affect normal/bright     LYMPHATICS: No cervical adenopathy    DIAGNOSTICS:   No labs or EKG required for low risk surgery (cataract, skin procedure, breast biopsy, etc)    Recent Labs   Lab Test 09/27/18 08/27/18 05/03/18   1156   04/06/17   1222   04/06/16   1309   07/20/11   0926   11/10/10   0948   HGB   --    --    --   12.6*   --   12.4*   --   13.5   < >   --    --   14.4   PLT   --    --    --    --    --   184   --   167   < >   --    --    --    INR  2.6*  3.1*   < >   --    < >   --    < >   --    < >   --    < >   --    NA   --    --    --   138   --   142   --   142   < >  142   < >  142   POTASSIUM   --    --    --   4.1   --   3.8   --   4.9   < >  4.4   < >  4.8   CR   --    --    --   1.21   --   1.12   --   1.29*   < >  1.31*   < >  1.29*   A1C   --    --    --    --    --    --    --    --    --   5.7   --   5.8    < > = values in this interval not displayed.        IMPRESSION:   Reason for surgery/procedure: cataracts  Diagnosis/reason for consult: clearance    The proposed surgical procedure is considered LOW risk.    REVISED CARDIAC RISK INDEX  The patient has the following serious cardiovascular risks for perioperative complications such as (MI, PE, VFib and 3  AV Block):  Congestive Heart Failure (pulmonary edema, PND, s3 mathieu, CXR with pulmonary congestion, basilar rales)  INTERPRETATION: no active CHF at this time/hx of afib stable 0 risks: Class I (very low risk - 0.4% complication rate)    The patient has the following additional risks for perioperative complications:  No identified additional risks      ICD-10-CM    1. Preop general physical exam Z01.818 CANCELED: Basic metabolic panel   2. CKD (chronic kidney disease) stage 3, GFR 30-59 ml/min (H) N18.3 **CBC with platelets FUTURE anytime      **Basic metabolic panel FUTURE anytime     CANCELED: Basic metabolic panel     CANCELED: CBC with platelets   3. Hypertension goal BP (blood pressure) < 140/90 I10 **CBC with platelets FUTURE anytime     **Basic metabolic panel FUTURE anytime   4. Chronic atrial fibrillation (H) I48.2 warfarin (COUMADIN) 2.5 MG tablet     **CBC with platelets FUTURE anytime     **Basic metabolic panel FUTURE anytime     CANCELED: Basic metabolic panel     CANCELED: CBC with platelets   5. Congestive heart failure, unspecified HF chronicity, unspecified heart failure type (H) I50.9 **CBC with platelets FUTURE anytime     **Basic metabolic panel FUTURE anytime     CANCELED: Basic metabolic panel     CANCELED: CBC with platelets   6. Long term current use of anticoagulant therapyChronic Z79.01 warfarin (COUMADIN) 2.5 MG tablet     **CBC with platelets FUTURE anytime     Preop. Medication list reviewed. Medication list reviewed and will have patient hold lisinopril on day of surgery to prevent hypotensive episodes. He can resume medication afterwards.     HTN. Controlled.     Chronic atrial fibrillation. Anticoagulated on coumadin. There's no need to hold coumadin for surgery in the absence of vasculature in the lens.     CHF. Afib-induced fluid overload in 2004, no difficulties since. Afib has been stable on coumadin.       RECOMMENDATIONS:     --Consult hospital rounder / IM to assist post-op medical management    --Patient is to take all scheduled medications on the day of surgery EXCEPT for modifications listed below.    -Patient to hold lisinopril on day of surgery. He can resume medication after the surgery.     APPROVAL GIVEN to proceed with proposed procedure, without further diagnostic evaluation       Signed Electronically by: Ry Shukla MD, MD    Copy of this evaluation report is provided to requesting physician.    [facility] Preop Guidelines    Revised Cardiac Risk Index

## 2018-10-29 ENCOUNTER — ANTICOAGULATION THERAPY VISIT (OUTPATIENT)
Dept: NURSING | Facility: CLINIC | Age: 83
End: 2018-10-29
Payer: COMMERCIAL

## 2018-10-29 DIAGNOSIS — I48.91 ATRIAL FIBRILLATION (H): ICD-10-CM

## 2018-10-29 DIAGNOSIS — Z79.01 LONG TERM CURRENT USE OF ANTICOAGULANT THERAPY: ICD-10-CM

## 2018-10-29 LAB — INR POINT OF CARE: 2.9 (ref 0.86–1.14)

## 2018-10-29 PROCEDURE — 36416 COLLJ CAPILLARY BLOOD SPEC: CPT

## 2018-10-29 PROCEDURE — 99207 ZZC NO CHARGE NURSE ONLY: CPT

## 2018-10-29 PROCEDURE — 85610 PROTHROMBIN TIME: CPT | Mod: QW

## 2018-10-29 NOTE — MR AVS SNAPSHOT
Israel Moyer   10/29/2018 9:20 AM   Anticoagulation Therapy Visit    Description:  83 year old male   Provider:  WESLY KAM   Department:  [facility]           INR as of 10/29/2018     Today's INR 2.9      Anticoagulation Summary as of 10/29/2018     INR goal 2.0-3.0   Today's INR 2.9   Full warfarin instructions 5 mg on Mon; 2.5 mg all other days   Next INR check 12/6/2018    Indications   Long term current use of anticoagulant therapy [Z79.01]  ATRIAL FIBRILLATION [I48.91]         Your next Anticoagulation Clinic appointment(s)     Dec 06, 2018  9:40 AM CST   Anticoagulation Visit with [facility]   [facility] ([facility])    [address] [place] [id number] [phone]              Contact Numbers     Please call [phone] to cancel and/or reschedule your appointment.  Please call [phone] with any problems or questions regarding your therapy          October 2018 Details    Sun Mon Tue Wed Thu Fri Sat      1               2               3               4               5               6                 7               8               9               10               11               12               13                 14               15               16               17               18               19               20                 21               22               23               24               25               26               27                 28               29      5 mg   See details      30      2.5 mg         31      2.5 mg             Date Details   10/29 This INR check               How to take your warfarin dose     To take:  2.5 mg Take 1 of the 2.5 mg tablets.    To take:  5 mg Take 2 of the 2.5 mg tablets.           November 2018 Details    Sun Mon Tue Wed Thu Fri Sat         1      2.5 mg         2      2.5 mg         3      2.5 mg           4      2.5 mg         5      5 mg         6      2.5 mg         7      2.5  mg         8      2.5 mg         9      2.5 mg         10      2.5 mg           11      2.5 mg         12      5 mg         13      2.5 mg         14      2.5 mg         15      2.5 mg         16      2.5 mg         17      2.5 mg           18      2.5 mg         19      5 mg         20      2.5 mg         21      2.5 mg         22      2.5 mg         23      2.5 mg         24      2.5 mg           25      2.5 mg         26      5 mg         27      2.5 mg         28      2.5 mg         29      2.5 mg         30      2.5 mg           Date Details   No additional details            How to take your warfarin dose     To take:  2.5 mg Take 1 of the 2.5 mg tablets.    To take:  5 mg Take 2 of the 2.5 mg tablets.           December 2018 Details    Sun Mon Tue Wed Thu Fri Sat           1      2.5 mg           2      2.5 mg         3      5 mg         4      2.5 mg         5      2.5 mg         6            7               8                 9               10               11               12               13               14               15                 16               17               18               19               20               21               22                 23               24               25               26               27               28               29                 30               31                     Date Details   No additional details    Date of next INR:  12/6/2018         How to take your warfarin dose     To take:  2.5 mg Take 1 of the 2.5 mg tablets.    To take:  5 mg Take 2 of the 2.5 mg tablets.

## 2018-10-29 NOTE — PROGRESS NOTES
ANTICOAGULATION FOLLOW-UP CLINIC VISIT    Patient Name:  Israel Moyer  Date:  10/29/2018  Contact Type:  Face to Face    SUBJECTIVE: having second cataract surgery tomorrow encouraged to eat a salad tonight.     Patient Findings     Positives No Problem Findings           OBJECTIVE    INR Protime   Date Value Ref Range Status   10/29/2018 2.9 (A) 0.86 - 1.14 Final       ASSESSMENT / PLAN  INR assessment THER    Recheck INR In: 6 WEEKS    INR Location Clinic      Anticoagulation Summary as of 10/29/2018     INR goal 2.0-3.0   Today's INR 2.9   Warfarin maintenance plan 5 mg (2.5 mg x 2) on Mon; 2.5 mg (2.5 mg x 1) all other days   Full warfarin instructions 5 mg on Mon; 2.5 mg all other days   Weekly warfarin total 20 mg   No change documented Mel Rasmussen RN   Plan last modified Jamaica Christensen RN (2/1/2018)   Next INR check 12/6/2018   Target end date Indefinite    Indications   Long term current use of anticoagulant therapy [Z79.01]  ATRIAL FIBRILLATION [I48.91]         Anticoagulation Episode Summary     INR check location [facility]    Preferred lab     Send INR reminders to [facility]    Comments             See the Encounter Report to view Anticoagulation Flowsheet and Dosing Calendar (Go to Encounters tab in chart review, and find the Anticoagulation Therapy Visit)        Mel Rasmussen RN

## 2018-12-06 ENCOUNTER — ANTICOAGULATION THERAPY VISIT (OUTPATIENT)
Dept: NURSING | Facility: CLINIC | Age: 83
End: 2018-12-06
Payer: COMMERCIAL

## 2018-12-06 DIAGNOSIS — Z79.01 LONG TERM CURRENT USE OF ANTICOAGULANT THERAPY: ICD-10-CM

## 2018-12-06 DIAGNOSIS — I48.91 ATRIAL FIBRILLATION (H): ICD-10-CM

## 2018-12-06 LAB — INR POINT OF CARE: 2.7 (ref 0.86–1.14)

## 2018-12-06 PROCEDURE — 85610 PROTHROMBIN TIME: CPT | Mod: QW

## 2018-12-06 PROCEDURE — 99207 ZZC NO CHARGE NURSE ONLY: CPT

## 2018-12-06 PROCEDURE — 36416 COLLJ CAPILLARY BLOOD SPEC: CPT

## 2018-12-06 NOTE — PROGRESS NOTES
ANTICOAGULATION FOLLOW-UP CLINIC VISIT    Patient Name:  Israel Moyer  Date:  12/6/2018  Contact Type:  Face to Face    SUBJECTIVE:     Patient Findings     Positives Inflammation (Large burn on his right middle finger from cooking on Thanksgiving. Cleaned and dressed  with a large bandaide), No Problem Findings           OBJECTIVE    INR Protime   Date Value Ref Range Status   12/06/2018 2.7 (A) 0.86 - 1.14 Final       ASSESSMENT / PLAN  INR assessment THER    Recheck INR In: 5 WEEKS    INR Location Clinic      Anticoagulation Summary as of 12/6/2018     INR goal 2.0-3.0   Today's INR 2.7   Warfarin maintenance plan 5 mg (2.5 mg x 2) on Mon; 2.5 mg (2.5 mg x 1) all other days   Full warfarin instructions 5 mg on Mon; 2.5 mg all other days   Weekly warfarin total 20 mg   No change documented Jamaica Christensen, RN   Plan last modified Jamaica Christensen RN (2/1/2018)   Next INR check 1/11/2019   Target end date Indefinite    Indications   Long term current use of anticoagulant therapy [Z79.01]  ATRIAL FIBRILLATION [I48.91]         Anticoagulation Episode Summary     INR check location [facility]    Preferred lab     Send INR reminders to [facility]    Comments             See the Encounter Report to view Anticoagulation Flowsheet and Dosing Calendar (Go to Encounters tab in chart review, and find the Anticoagulation Therapy Visit)        Jamaica Christensen, SIXTO

## 2018-12-06 NOTE — MR AVS SNAPSHOT
Israel Moyer   12/6/2018 9:40 AM   Anticoagulation Therapy Visit    Description:  83 year old male   Provider:  [facility] ANTI EDDY   Department:  [facility]           INR as of 12/6/2018     Today's INR 2.7      Anticoagulation Summary as of 12/6/2018     INR goal 2.0-3.0   Today's INR 2.7   Full warfarin instructions 5 mg on Mon; 2.5 mg all other days   Next INR check 1/11/2019    Indications   Long term current use of anticoagulant therapy [Z79.01]  ATRIAL FIBRILLATION [I48.91]         Your next Anticoagulation Clinic appointment(s)     Jan 11, 2019  9:40 AM CST   Anticoagulation Visit with [facility] ANTI COAG   [facility] ([facility])    [address] [place] [id number] [phone]              Contact Numbers     Please call [phone] to cancel and/or reschedule your appointment.  Please call [phone] with any problems or questions regarding your therapy          December 2018 Details    Sun Mon Tue Wed Thu Fri Sat           1                 2               3               4               5               6      2.5 mg   See details      7      2.5 mg         8      2.5 mg           9      2.5 mg         10      5 mg         11      2.5 mg         12      2.5 mg         13      2.5 mg         14      2.5 mg         15      2.5 mg           16      2.5 mg         17      5 mg         18      2.5 mg         19      2.5 mg         20      2.5 mg         21      2.5 mg         22      2.5 mg           23      2.5 mg         24      5 mg         25      2.5 mg         26      2.5 mg         27      2.5 mg         28      2.5 mg         29      2.5 mg           30      2.5 mg         31      5 mg               Date Details   12/06 This INR check               How to take your warfarin dose     To take:  2.5 mg Take 1 of the 2.5 mg tablets.    To take:  5 mg Take 2 of the 2.5 mg tablets.           January 2019 Details    Sun Mon Tue Wed Thu Fri Sat       1       2.5 mg         2      2.5 mg         3      2.5 mg         4      2.5 mg         5      2.5 mg           6      2.5 mg         7      5 mg         8      2.5 mg         9      2.5 mg         10      2.5 mg         11            12                 13               14               15               16               17               18               19                 20               21               22               23               24               25               26                 27               28               29               30               31                  Date Details   No additional details    Date of next INR:  1/11/2019         How to take your warfarin dose     To take:  2.5 mg Take 1 of the 2.5 mg tablets.    To take:  5 mg Take 2 of the 2.5 mg tablets.

## 2019-01-03 ENCOUNTER — TRANSFERRED RECORDS (OUTPATIENT)
Dept: HEALTH INFORMATION MANAGEMENT | Facility: CLINIC | Age: 84
End: 2019-01-03

## 2019-01-03 ENCOUNTER — TELEPHONE (OUTPATIENT)
Dept: FAMILY MEDICINE | Facility: CLINIC | Age: 84
End: 2019-01-03

## 2019-01-03 NOTE — TELEPHONE ENCOUNTER
See other encounter: Nurse from [facility] called stating she did a Qualtaflo test on the patient today and found in the right leg significant artery disease and the left leg severe artery disease.

## 2019-01-03 NOTE — TELEPHONE ENCOUNTER
Reason for Call:  Other     Detailed comments: Nurse from [facility] called stating she did a Qualtaflo test on the patient today and found in the right leg significant arty disease and the left leg sever artery disease    Phone Number Patient can be reached at: Other phone number: [phone]    Best Time: anytime    Can we leave a detailed message on this number? YES    Call taken on 1/3/2019 at 2:21 PM by Yani Felton

## 2019-01-03 NOTE — TELEPHONE ENCOUNTER
Israel Moyer is a 83 year old male who calls with a swollen left leg.    NURSING ASSESSMENT:  Description:  Nurse from [facility] came over and thought she had severe PAD and should see PCP. He denies pain or redness in his leg or any chest or breathing symptoms.   Onset/duration:  2 days  Precip. factors:  none  Associated symptoms:  none  Improves/worsens symptoms: he will try to elevate  Pain scale (0-10)   0/10  Last exam/Treatment:  10/11/18  Allergies:   Allergies   Allergen Reactions     Oxycodone      nausea     Simvastatin Cramps       NURSING PLAN: Huddle with provider, plan includes be seen tomorrow. He will go to ER if he develops pain or trouble breathing. Patient verbalized understanding.      RECOMMENDED DISPOSITION:    Will comply with recommendation: Yes  If further questions/concerns or if symptoms do not improve, worsen or new symptoms develop, call your PCP or [facility] as soon as possible.    Guideline used:  Telephone Triage Protocols for Nurses, Fifth Edition, Joyce Rico RN

## 2019-01-03 NOTE — TELEPHONE ENCOUNTER
Reason for call:  Patient reporting a symptom    Symptom or request: Swollen leg     Duration (how long have symptoms been present): Two days    Have you been treated for this before? No    Additional comments: Patient called and stated his left leg has been swollen for the past two days. Patient also stated that he was not able to schedule an appointment sooner than the 10th, and he would like to speak to a nurse to discuss symptoms before the appointment.    Phone Number patient can be reached at:  Cell number on file:    Telephone Information:   Mobile [phone]       Best Time:  Anytime    Can we leave a detailed message on this number:  NO    Call taken on 1/3/2019 at 2:13 PM by Lolly Chaudhari

## 2019-01-04 ENCOUNTER — OFFICE VISIT (OUTPATIENT)
Dept: FAMILY MEDICINE | Facility: CLINIC | Age: 84
End: 2019-01-04
Payer: COMMERCIAL

## 2019-01-04 ENCOUNTER — ANCILLARY PROCEDURE (OUTPATIENT)
Dept: ULTRASOUND IMAGING | Facility: CLINIC | Age: 84
End: 2019-01-04
Payer: COMMERCIAL

## 2019-01-04 ENCOUNTER — TELEPHONE (OUTPATIENT)
Dept: OTHER | Facility: CLINIC | Age: 84
End: 2019-01-04

## 2019-01-04 ENCOUNTER — ANCILLARY PROCEDURE (OUTPATIENT)
Dept: GENERAL RADIOLOGY | Facility: CLINIC | Age: 84
End: 2019-01-04
Payer: COMMERCIAL

## 2019-01-04 VITALS
DIASTOLIC BLOOD PRESSURE: 60 MMHG | HEART RATE: 77 BPM | BODY MASS INDEX: 35.8 KG/M2 | WEIGHT: 244.2 LBS | SYSTOLIC BLOOD PRESSURE: 116 MMHG | OXYGEN SATURATION: 97 % | TEMPERATURE: 97.6 F

## 2019-01-04 DIAGNOSIS — E66.01 MORBID OBESITY (H): ICD-10-CM

## 2019-01-04 DIAGNOSIS — M79.89 LEFT LEG SWELLING: ICD-10-CM

## 2019-01-04 DIAGNOSIS — I73.9 PAD (PERIPHERAL ARTERY DISEASE) (H): ICD-10-CM
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
## 2019-01-04 DIAGNOSIS — I73.9 PAD (PERIPHERAL ARTERY DISEASE) (H): Primary | ICD-10-CM

## 2019-01-04 DIAGNOSIS — Z23 NEED FOR PROPHYLACTIC VACCINATION AND INOCULATION AGAINST INFLUENZA: Primary | ICD-10-CM

## 2019-01-04 PROCEDURE — 99214 OFFICE O/P EST MOD 30 MIN: CPT | Performed by: FAMILY MEDICINE

## 2019-01-04 PROCEDURE — 73590 X-RAY EXAM OF LOWER LEG: CPT | Mod: LT

## 2019-01-04 PROCEDURE — 93971 EXTREMITY STUDY: CPT | Mod: LT

## 2019-01-04 NOTE — PATIENT INSTRUCTIONS
Close monitor for shortness of breath or chest pain, if you experience these, immediately go to the emergency department.   Close monitor for pain.  Complete left leg ultrasound as discussed today.   Left leg xray today.       [facility]   Discharged by : Linda Lopez MA  Paper scripts provided to patient : none     If you have any questions regarding your visit please contact your care team:     Team Gold                Clinic Hours Telephone Number     Dr. Halie Avila, CNP 7am-7pm  Monday - Thursday   7am-5pm  Fridays  [phone]   (Appointment scheduling available 24/7)     RN Line  [phone] option 2     Urgent Care - [place] - 11am-9pm Monday-Friday Saturday-Sunday- 9am-5pm     [place] -   5pm-9pm Monday-Friday Saturday-Sunday- 9am-5pm    [phone] - [place]    [phone] - [place]     For a Price Quote for your services, please call our Consumer Price Line at [phone].     What options do I have for visits at the clinic other than the traditional office visit?     To expand how we care for you, many of our providers are utilizing electronic visits (e-visits) and telephone visits, when medically appropriate, for interactions with their patients rather than a visit in the clinic. We also offer nurse visits for many medical concerns. Just like any other service, we will bill your insurance company for this type of visit based on time spent on the phone with your provider. Not all insurance companies cover these visits. Please check with your medical insurance if this type of visit is covered. You will be responsible for any charges that are not paid by your insurance.   E-visits via AngioScore: generally incur a $35.00 fee.     Telephone visits:  Time spent on the phone: *charged based on time that is spent on the phone in increments of 10 minutes. Estimated cost:   5-10 mins  $30.00   11-20 mins. $59.00   21-30 mins. $85.00     Use Shoop (secure email communication and access to your chart) to send your primary care provider a message or make an appointment. Ask someone on your Team how to sign up for Shoop.     As always, Thank you for trusting us with your health care needs!    [facility]:    Scheduling Appointments  [facility]  Call: [phone]    [facility]  Call: [phone]    [facility]  Call: [phone]    For Gastroenterology referrals   [facility]   Clinics and Surgery Center, 4th Floor   [address] [place] [address]   Appointments: [phone]    WHERE TO GO FOR CARE?  Clinic    Make an appointment if you:       Are sick (cold, cough, flu, sore throat, earache or in pain).       Have a small injury (sprain, small cut, burn or broken bone).       Need a physical exam, Pap smear, vaccine or prescription refill.       Have questions about your health or medicines.    To reach us:      Call [phone] ([phone]). Open 24 hours every day. (For counseling services, call [phone].)    Log into Shoop at [email or web address]. (Visit [email or web address] to create an account.) Hospital emergency room    An emergency is a serious or life- threatening problem that must be treated right away.    Call 640 or get to the hospital if you have:      Very bad or sudden:            - Chest pain or pressure         - Bleeding         - Head or belly pain         - Dizziness or trouble seeing, walking or                          Speaking      Problems breathing      Blood in your vomit or you are coughing up blood      A major injury (knocked out, loss of a finger or limb, rape, broken bone protruding from skin)    A mental health crisis. (Or call the Mental Health Crisis line at 1-947.693.6079 or Suicide Prevention Hotline at  3-550-698-2578.)    Open 24 hours every day. You don't need an appointment.     Urgent care    Visit urgent care for sickness or small injuries when the clinic is closed. You don't need an appointment. To check hours or find an urgent care near you, visit [email or web address]. Online care    Get online [facility] from [facility] for more than 70 common problems, like colds, allergies and infections. Open 24 hours every day at:   [email or web address]   Need help deciding?    For advice about where to be seen, you may call your clinic and ask to speak with a nurse. We're here for you 24 hours every day.         If you are deaf or hard of hearing, please let us know. We provide many free services including sign language interpreters, oral interpreters, TTYs, telephone amplifiers, note takers and written materials.

## 2019-01-04 NOTE — TELEPHONE ENCOUNTER
"Pt referred to [facility] by Andrea Shah DO for left leg swelling and PAD.    Per Epic review, appears pt has had previous QuantaFlo testing done at his house, no results available in Epic.  Per 1/4/18 office note: \"Patient is here after Nurse from [facility]  did a Qualtaflo test on 1/3/19 and found in the right leg significant arty disease and the left leg sever artery disease. Patient reports that [facility] done last year was fine. Patient reports that a nurse comes yearly for routine visit.\"     Pt needs to be scheduled for YAMILEX with exercise and consult with Vascular Medicine.  Will route to scheduling to coordinate an appointment at next available.    RICK MartínezN RN    "
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
## 2019-01-04 NOTE — TELEPHONE ENCOUNTER
Called and left a message for the patient to call us back to schedule an YAMILEX with Exercise (unless with Dr. Choudhury) and a consult appointment with vascular medicine.

## 2019-01-07 NOTE — RESULT ENCOUNTER NOTE
All your results are essentially deepak. Please contact me if you have any questions.  Take care,  Andrea Shah D.O.

## 2019-01-08 NOTE — TELEPHONE ENCOUNTER
Called and left a message for the patient to call us back to schedule an US YAMILEX (unless with Dr. Choudhury) and a consult with vascular medicine. Stated this would be our last attempt at calling them and to call us back at their convenience.

## 2019-01-10 ENCOUNTER — OFFICE VISIT (OUTPATIENT)
Dept: FAMILY MEDICINE | Facility: CLINIC | Age: 84
End: 2019-01-10
Payer: COMMERCIAL

## 2019-01-10 VITALS
WEIGHT: 241 LBS | TEMPERATURE: 97.5 F | BODY MASS INDEX: 35.33 KG/M2 | OXYGEN SATURATION: 99 % | SYSTOLIC BLOOD PRESSURE: 111 MMHG | HEART RATE: 72 BPM | DIASTOLIC BLOOD PRESSURE: 65 MMHG

## 2019-01-10 DIAGNOSIS — I87.2 VENOUS STASIS DERMATITIS OF BOTH LOWER EXTREMITIES: Primary | ICD-10-CM

## 2019-01-10 DIAGNOSIS — I73.9 PVD (PERIPHERAL VASCULAR DISEASE) (H): ICD-10-CM

## 2019-01-10 PROCEDURE — 99214 OFFICE O/P EST MOD 30 MIN: CPT | Performed by: FAMILY MEDICINE

## 2019-01-11 ENCOUNTER — TELEPHONE (OUTPATIENT)
Dept: FAMILY MEDICINE | Facility: CLINIC | Age: 84
End: 2019-01-11

## 2019-01-11 ENCOUNTER — ANTICOAGULATION THERAPY VISIT (OUTPATIENT)
Dept: NURSING | Facility: CLINIC | Age: 84
End: 2019-01-11
Payer: COMMERCIAL

## 2019-01-11 DIAGNOSIS — I48.91 ATRIAL FIBRILLATION (H): ICD-10-CM

## 2019-01-11 DIAGNOSIS — Z79.01 LONG TERM CURRENT USE OF ANTICOAGULANT THERAPY: ICD-10-CM

## 2019-01-11 LAB — INR POINT OF CARE: 2 (ref 0.86–1.14)

## 2019-01-11 PROCEDURE — 36416 COLLJ CAPILLARY BLOOD SPEC: CPT

## 2019-01-11 PROCEDURE — 85610 PROTHROMBIN TIME: CPT | Mod: QW

## 2019-01-11 PROCEDURE — 99207 ZZC NO CHARGE NURSE ONLY: CPT

## 2019-01-11 NOTE — TELEPHONE ENCOUNTER
Patient's daughter called back and got him scheduled for 1/17/19 for US YAMILEX and consult with Dr. Barrios.     Awaiting US YAMILEX order to be able to put on the schedule.

## 2019-01-11 NOTE — PROGRESS NOTES
SUBJECTIVE:   Israel Moyer is a 83 year old male who presents to clinic today for the following health issues:      The patient is here today for management of his chronic venous stasis of his both leg. He currently reports no pain, he denies infection or drainage. The patient is currently working with a vascular surgeon. He had a recent doppler ultrasound which was negative. The vascular surgeon had reached out to the patient and left a message. Patient will be following up with the vascular surgeon.      Immunization:  The patient refused the influenza vaccine, but is up to date with his pneumonia vaccine.    Problem list and histories reviewed & adjusted, as indicated.  Additional history: as documented    Patient Active Problem List   Diagnosis     ATRIAL FIBRILLATION     Congestive heart failure (H)     Generalized osteoarthrosis, unspecified site     Anemia     Hypothyroidism     Anal fissure     VENTRAL HERNIA     Hyperlipidemia LDL goal <100     CKD (chronic kidney disease) stage 3, GFR 30-59 ml/min (H)     Advanced directives, counseling/discussion     Gout     Hypertension goal BP (blood pressure) < 140/90     Long term current use of anticoagulant therapy     Hypothyroidism due to acquired atrophy of thyroid     Obesity (BMI 35.0-39.9) with comorbidity (H)     Past Surgical History:   Procedure Laterality Date     C APPENDECTOMY       C TOTAL KNEE ARTHROPLASTY  2000    right     CARPAL TUNNEL RELEASE RT/LT  2006    RIGHT     HC COLONOSCOPY THRU STOMA, DIAGNOSTIC  1977    no symptoms; worked at [employer] at the time.     JOINT REPLACEMTN, KNEE RT/LT  ~2005    Joint Replacement knee LT       Social History     Tobacco Use     Smoking status: Never Smoker     Smokeless tobacco: Never Used   Substance Use Topics     Alcohol use: Yes     Comment: not much     Family History   Problem Relation Age of Onset     Hypertension Mother      C.A.D. Father      Diabetes Father      Hypertension Father      Hypertension  Sister      Cerebrovascular Disease No family hx of      Cancer - colorectal No family hx of      Prostate Cancer No family hx of          Current Outpatient Medications   Medication Sig Dispense Refill     allopurinol (ZYLOPRIM) 300 MG tablet Take 1 tablet (300 mg) by mouth daily 90 tablet 3     furosemide (LASIX) 40 MG tablet Take 1 tablet (40 mg) by mouth daily 90 tablet 3     levothyroxine (SYNTHROID/LEVOTHROID) 50 MCG tablet Take 1 tablet (50 mcg) by mouth daily 90 tablet 3     lisinopril (PRINIVIL/ZESTRIL) 10 MG tablet TAKE ONE TABLET BY MOUTH ONCE DAILY 90 tablet 3     metoprolol succinate (TOPROL-XL) 100 MG 24 hr tablet Take 1 tablet (100 mg) by mouth daily 90 tablet 3     pravastatin (PRAVACHOL) 80 MG tablet Take 1 tablet (80 mg) by mouth daily 90 tablet 3     warfarin (COUMADIN) 2.5 MG tablet TAKE TWO TABLETS (5 MG) BY MOUTH ON MONDAY, WEDNESDAY, AND FRIDAY, AND TAKE ONE TABLET (2.5 MG) ALL OTHER DAYS OF THE WEEK 180 tablet 3     meclizine (ANTIVERT) 25 MG tablet Take 1 tablet (25 mg) by mouth 3 times daily as needed for dizziness (Patient not taking: Reported on 1/10/2019) 30 tablet 1     Allergies   Allergen Reactions     Oxycodone      nausea     Simvastatin Cramps     Recent Labs   Lab Test 05/03/18  1156 04/06/17  1222 04/06/16  1309 12/28/15  1450  07/20/11  0926  11/10/10  0948   A1C  --   --   --   --   --  5.7  --  5.8   LDL 90 70 88  --    < > 91   < > 122   HDL 38* 36* 43  --    < > 41   < > 44   TRIG 169* 179* 191*  --    < > 127   < > 184*   ALT 19 21  --  21   < > 23  --  20   CR 1.21 1.12 1.29* 1.38*   < > 1.31*   < > 1.29*   GFRESTIMATED 58* 63 54* 50*   < > 54*   < > 55*   GFRESTBLACK 70 76 65 60*   < > 65   < > 66   POTASSIUM 4.1 3.8 4.9 4.7   < > 4.4   < > 4.8   TSH 4.32* 3.01 4.79*  --    < > 4.65  --  5.47*    < > = values in this interval not displayed.      BP Readings from Last 3 Encounters:   01/10/19 111/65   01/04/19 116/60   10/11/18 124/56    Wt Readings from Last 3 Encounters:    01/10/19 241 lb (109.3 kg)   01/04/19 244 lb 3.2 oz (110.8 kg)   10/11/18 239 lb 6.4 oz (108.6 kg)                  Labs reviewed in EPIC    Reviewed and updated as needed this visit by clinical staff  Tobacco  Allergies  Meds  Med Hx  Surg Hx  Fam Hx  Soc Hx      Reviewed and updated as needed this visit by Provider         ROS:  Constitutional, HEENT, cardiovascular, pulmonary, gi and gu systems are negative, except as otherwise noted.    This document serves as a record of the services and decisions personally performed by GURMEET CHASE. It was created on his behalf by Kumar Trejo trained medical scribes. The creation of this document is based on the provider's statements to the medical scribe. Kumar Trejo, January 10, 2019 6:40 PM      OBJECTIVE:     /65 (BP Location: Right arm, Patient Position: Sitting, Cuff Size: Adult Regular)   Pulse 72   Temp 97.5  F (36.4  C) (Oral)   Wt 241 lb (109.3 kg)   SpO2 99%   BMI 35.33 kg/m    Body mass index is 35.33 kg/m .  GENERAL: obese, alert and no distress  RESP: lungs clear to auscultation - no rales, rhonchi or wheezes  CV: regular rate and rhythm, normal S1 S2, no S3 or S4, no murmur, click or rub, no peripheral edema and peripheral pulses strong  MS: chronic venous stasis bilateral lower extremity, no infection, no gross musculoskeletal defects noted, no edema  SKIN: chronic venous stasis of bilateral leg, no infection noticed, no suspicious lesions or rashes  NEURO: Normal strength and tone, mentation intact and speech normal  PSYCH: mentation appears normal, affect normal/bright  Foot: foot deformity, no peripheral pulses, but no ulcerations or signs of infections.    Diagnostic Test Results:  No results found for this or any previous visit (from the past 24 hour(s)).    ASSESSMENT/PLAN:     (I87.2) Venous stasis dermatitis of both lower extremities  (primary encounter diagnosis)  Comment: Bilateral chronic venous stasis   Plan: Patient will  follow up with the vascular surgeon office.     The patient has referral to vascular surgery; he did not review his voice mail. Reviewed that they have been trying to contact him. Phone number given    (G26.9) PVD (peripheral vascular disease) (H)  Comment: absent peripheral pulses bilaterally,   Plan: The patient has referral to vascular surgery; he did not review his voice mail. Reviewed that they have been trying to contact him. Phone number given        Patient Instructions   Vascular Surgeon Office:   If you do not receive a call from them within 24-48 hours you may reach them at [phone]. Mile Torrez      The information in this document, created by the medical scribkulwinder Trejo, accurately reflects the services I personally performed and the decisions made by me. I have reviewed and approved this document for accuracy prior to leaving the patient care area.      Ry Shukla MD, MD  [facility]

## 2019-01-11 NOTE — TELEPHONE ENCOUNTER
Ngozi is on consent to communicate form.  Reviewed 1/10 AVS and she will see her dad and call to schedule a vascular appt.    Ameena Rico RN

## 2019-01-11 NOTE — PROGRESS NOTES
ANTICOAGULATION FOLLOW-UP CLINIC VISIT    Patient Name:  Israel Moyer  Date:  2019  Contact Type:  Face to Face    SUBJECTIVE:     Patient Findings     Positives:   No Problem Findings           OBJECTIVE    INR Protime   Date Value Ref Range Status   2019 2.0 (A) 0.86 - 1.14 Final       ASSESSMENT / PLAN  INR assessment THER    Recheck INR In: 5 WEEKS    INR Location Clinic      Anticoagulation Summary  As of 2019    INR goal:   2.0-3.0   TTR:   75.0 % (3.4 y)   INR used for dosin.0 (2019)   Warfarin maintenance plan:   5 mg (2.5 mg x 2) every Mon; 2.5 mg (2.5 mg x 1) all other days   Full warfarin instructions:   5 mg every Mon; 2.5 mg all other days   Weekly warfarin total:   20 mg   No change documented:   Jamaica Christensen RN   Plan last modified:   Jamaica Christensen RN (2018)   Next INR check:   2019   Target end date:   Indefinite    Indications    Long term current use of anticoagulant therapy [Z79.01]  ATRIAL FIBRILLATION [I48.91]             Anticoagulation Episode Summary     INR check location:   [facility]    Preferred lab:       Send INR reminders to:   [facility]    Comments:               See the Encounter Report to view Anticoagulation Flowsheet and Dosing Calendar (Go to Encounters tab in chart review, and find the Anticoagulation Therapy Visit)        Jamaica Christensen RN

## 2019-01-11 NOTE — TELEPHONE ENCOUNTER
Reason for Call:  Other     Detailed comments: Patients daughter would like to know diagnosis on legs.    Phone Number Patient can be reached at: Other phone number:   heidi [phone]         Best Time:     Can we leave a detailed message on this number? Not Applicable    Call taken on 1/11/2019 at 9:37 AM by Ramandeep Lentz

## 2019-01-11 NOTE — PATIENT INSTRUCTIONS
Vascular Surgeon Office:   If you do not receive a call from them within 24-48 hours you may reach them at [phone]. [facility]    [facility] [place]: Phone: [phone]      Consider shingles vaccine. It may be best to get at pharmacy to know the cost. Your need 2 shots with the second sometime 2 months after the first.

## 2019-01-17 ENCOUNTER — HOSPITAL ENCOUNTER (OUTPATIENT)
Dept: ULTRASOUND IMAGING | Facility: CLINIC | Age: 84
Discharge: HOME | End: 2019-01-17
Attending: INTERNAL MEDICINE | Admitting: INTERNAL MEDICINE
Payer: COMMERCIAL

## 2019-01-17 ENCOUNTER — OFFICE VISIT (OUTPATIENT)
Dept: OTHER | Facility: CLINIC | Age: 84
End: 2019-01-17
Attending: INTERNAL MEDICINE
Payer: COMMERCIAL

## 2019-01-17 VITALS
DIASTOLIC BLOOD PRESSURE: 74 MMHG | RESPIRATION RATE: 18 BRPM | BODY MASS INDEX: 34.93 KG/M2 | OXYGEN SATURATION: 98 % | HEIGHT: 70 IN | WEIGHT: 244 LBS | HEART RATE: 61 BPM | SYSTOLIC BLOOD PRESSURE: 122 MMHG

## 2019-01-17 DIAGNOSIS — I77.810 AORTIC ROOT DILATION (H): ICD-10-CM

## 2019-01-17 DIAGNOSIS — I87.2 VENOUS (PERIPHERAL) INSUFFICIENCY: ICD-10-CM

## 2019-01-17 DIAGNOSIS — I48.0 PAROXYSMAL ATRIAL FIBRILLATION (H): ICD-10-CM

## 2019-01-17 DIAGNOSIS — I73.9 PAD (PERIPHERAL ARTERY DISEASE) (H): Primary | ICD-10-CM

## 2019-01-17 DIAGNOSIS — I10 ESSENTIAL HYPERTENSION: ICD-10-CM

## 2019-01-17 DIAGNOSIS — E78.5 HYPERLIPIDEMIA LDL GOAL <70: ICD-10-CM

## 2019-01-17 DIAGNOSIS — I73.9 PAD (PERIPHERAL ARTERY DISEASE) (H): ICD-10-CM

## 2019-01-17 PROCEDURE — 99354 C PROLONGED SERV,OFFICE,1ST HR: CPT | Mod: ZP | Performed by: INTERNAL MEDICINE

## 2019-01-17 PROCEDURE — 99205 OFFICE O/P NEW HI 60 MIN: CPT | Mod: ZP | Performed by: INTERNAL MEDICINE

## 2019-01-17 PROCEDURE — 93924 LWR XTR VASC STDY BILAT: CPT

## 2019-01-17 PROCEDURE — G0463 HOSPITAL OUTPT CLINIC VISIT: HCPCS

## 2019-01-17 RX ORDER — ROSUVASTATIN CALCIUM 40 MG/1
40 TABLET, COATED ORAL DAILY
Qty: 90 TABLET | Refills: 3 | Status: SHIPPED | OUTPATIENT
Start: 2019-01-17 | End: 2020-01-27

## 2019-01-17 ASSESSMENT — MIFFLIN-ST. JEOR: SCORE: 1818.03

## 2019-01-17 NOTE — PROGRESS NOTES
"Israel Moyer is a 83 year old male who presents for:  Chief Complaint   Patient presents with     RECHECK     NEW patient         Vitals:    Vitals:    01/17/19 1038 01/17/19 1040   BP: 125/75 122/74   BP Location: Right arm Left arm   Patient Position: Chair Chair   Cuff Size: Adult Regular Adult Regular   Pulse: 61    Resp: 18    SpO2: 98%    Weight: 244 lb (110.7 kg)    Height: 5' 10\" (1.778 m)        BMI:  Estimated body mass index is 35.01 kg/m  as calculated from the following:    Height as of this encounter: 5' 10\" (1.778 m).    Weight as of this encounter: 244 lb (110.7 kg).    Pain Score:  Data Unavailable        Vernon Romero MA    "

## 2019-01-17 NOTE — PROGRESS NOTES
See letter to PCP.   Greater than one half the 100 minutes total spent on the pt's visit were spent providing education and counselling to the patient regarding the above matters. Extended time secondary to need to see a patient new to myself with multiple pathologic problems requiring time intensive explanations of various appropriate available treatments.    Physical exam Reveals:    O/P: WNL  HEENT: WNL  NECK: No JVD, thyromegaly, or lymphadenopathy  HEART: irr, no murmurs, gallops, or rubs  LUNGS: CTA bilaterally without rales, wheezes, or rhonchi  GI: NABS, nondistended, nontender, soft  EXT:without cyanosis, clubbing, or edema, multiple gouty tophi  NEURO: nonfocal  : no flank tenderness      Rt femoral: Three plus palpable  Rt popliteal: Nonpalpable, triphasic dopplerable  Rt DP:  Nonpalpable, triphasic dopplerable  Rt PT:  Nonpalpable, triphasic dopplerable    Lt femoral:  Three plus palpable  Lt popliteal:  Nonpalpable, triphasic dopplerable  Lt DP:  Nonpalpable, biphasic dopplerable  Lt PT:  Nonpalpable, triphasic dopplerable    CEAP C5 varicosities bilaterally with lipodermatosclerosis and prior healed ulcerations  Toenails onychomycotic  Shins with loss of hair growth  Multiple small superficial ulcerations in various stages of healing

## 2019-01-18 NOTE — PROGRESS NOTES
Visit Date:   01/17/2019      Dear Zechariah:      Thank you for your very kind referral of Mr. Israel Moyer.  You recall that he is an obese, 83-year-old hypertensive, hyperlipidemic, gout suffer with chronic atrial fibrillation for which he is chronically anticoagulated with warfarin.  You had previously wanted him to be seen by our office.  The patient had not answered his voice mail, but we were thankfully able to get him scheduled to be seen on an expedited basis.  Your concerns upon seeing him on 01/10 pertain to venous insufficiency findings as well as absent peripheral pulses on palpation.  The patient also suffers from peripheral neuropathy and frequently falls, and has developed multiple small ulcerations across his bilateral shins and feet.  These, however, are healing slowly but well.  He has no infections in these areas      The patient had no idea why he was here to see me, and I accordingly gleamed all of the above from the charts.      REVIEW OF SYSTEMS:  He denies chest pain, shortness of breath, nausea, vomiting or diarrhea.  He states that he is actually able to ambulate greater than 1 mile without pain in his calves or his thighs.  However, his ambulation is limited due to his peripheral neuropathy and osteoarthritis, for which he has had bilateral knee replacements.  The patient utilizes a walker to ambulate or a cane to ambulate simply because of balance problems.  He states he has fallen many times in the last year and hit his head on one occasion.  Thankfully, CT of the head was without a subdural hematoma.  The remainder of his 14-point review of systems is within normal limits.      CURRENT MEDICATIONS:  As follows:   1.  Allopurinol 300 mg daily.   2.  Lasix 40 mg daily.   3.  Synthroid 50 mcg daily.   4.  Lisinopril 10 mg daily.   5.  Toprol- mg daily.   6.  Pravastatin 80 mg daily.   7.  Warfarin 5 mg by mouth on Monday, Wednesday and Friday, 2.5 mg other days of the week.      PAST  MEDICAL HISTORY:  Notable for the followin.  Hypothyroidism.   2.  Hypertension.   3.  Hyperlipidemia.   4.  Gout.   5.  Osteoarthritis.   6.  Congestive heart failure with preserved ejection fraction.   7.  Chronic atrial fibrillation.   8.  Venous insufficiency.   9.  Peripheral artery disease recognized on today's date.      PAST SURGICAL HISTORY:  As follows:   1.  Colonoscopy in .   2.  Appendectomy, date unknown.   3.  Bilateral knee replacements, dates unknown.   4.  Right carpal tunnel release in .      FAMILY HISTORY:  Notable for coronary artery disease, diabetes and hypertension in his  father.  Mother has a history of hypertension, as do 2 sisters.      SOCIAL HISTORY:  From a social perspective, the patient is a lifelong nonsmoker.  He is a retired .  He is .  He has 2 children.        PHYSICAL EXAMINATION:   GENERAL:  Currently the patient is awake, alert and oriented.   VITAL SIGNS:  Blood pressure is 125/75 in the right arm and 122/74 in the left arm.  Heart rate is 61 and irregular.  Respiratory rate is 12, pulse ox 98% on room air.  Height is 5 feet 10 inches tall.  Weight is 244 pounds.  Body mass index is 35.01.   HEENT:  Oropharynx within normal limits.   NECK:  Reveals no JVD, thyromegaly, lymphadenopathy or carotid bruits.   LUNGS:  Clear to auscultation bilaterally without rales, wheezes or rhonchi.   HEART:  Irregularly irregular with normal S1, S2, no S3, S4, murmur, gallop or rub.  The patient has a barrel chest.   ABDOMEN:  Obese, soft, nondistended, nontender.   EXTREMITIES:  Without cyanosis or clubbing.  The patient has C5 varicosities bilaterally with lipodermatosclerotic changes and prior healed ulcerations.  He does have current ulcerations; however, these are not venous ulcerations.  Rather, they are traumatic ulcerations in various stages of healing across bilateral shins and feet.  There are no nonhealing ulcers, and those ulcers which  are healing are not infected.  His shins are with loss of hair growth.  His toenails are onychomycotic.  On peripheral arterial examination, he has a 3+ palpable femoral pulse bilaterally.  His popliteal, DP and PT pulses are nonpalpable bilaterally.  However, his popliteal, DP and PT pulses on the right are triphasic, Doppler auscultatable.  On the left, his left PT and popliteal pulses are triphasic, Doppler auscultatable, whereas his left DP is biphasic, Doppler auscultatable.     NEUROLOGIC:  Nonfocal, although the patient has decreased sensation in his hands and feet bilaterally.   DERMATOLOGIC:  Reveals lipodermatosclerotic changes, as delineated above.   HEMATOLOGIC:  Reveals no lymphadenopathy.      Noninvasive imaging obtained today included a resting YAMILEX.  The patient was not able to walk rapidly on a treadmill due to his peripheral neuropathy.  His resting YAMILEX on the right is 1.13 and on the left it is 1.11.  However, it should be noted that the patient's DP ankle-brachial index is moderately diminished at 0.69.  He has triphasic waveforms on his right leg.  On the left, he has triphasic waveforms in the femoral position, but biphasic and monophasic to biphasic waveforms distally.        Echocardiogram reviewed from 2007, reveals aortic root dilation and mild left atrial dilation.  Ejection fraction at that time was preserved.        LDL cholesterol obtained in 05/2018, while on pravastatin 80 mg daily was 90 mg/dL.  Creatinine of 05/2018 was normal at 1.21 with a GFR of 58.      IMPRESSION:     1.  Mild peripheral artery disease without lifestyle-limiting claudication at present.     2.  Chronic atrial fibrillation.     3.  Aortic root dilation.     4.  Peripheral venous insufficiency with lipodermatosclerotic changes and healing traumatic ulcerations of the shins and feet.     5.  Hypertension.     6.  Hyperlipidemia with LDL of less than 70.      DISCUSSION:        Firstly, thank you for your very kind  referral of this patient.  It is not uncommon for elderly patients to have nonpalpable pulses, yet have multiphasic pulses on Doppler interrogation.  The patient is not currently lifestyle limited sufficiently from his peripheral artery disease that he is unable to ambulate.  Rather, his ambulatory limitation is musculoskeletal in origin.  Nonetheless, he does have multiple slowly-healing ulcerations across his bilateral shins and feet.  This is due to a combination of his venous insufficiency and arterial insufficiency.  As such, the patient would benefit from PAD rehabilitation.  Specifically, these ulcerations are not venous or arterial in origin, but rather are traumatic in origin from his falls.  The patient will participate in PAD rehabilitation simply to increase collateral blood flow to assist with healing of the above.  I will see the patient back in 3 months.      I would also recommend that atherosclerotic risk factor reduction be maximized.  The patient has had difficulty tolerating simvastatin in the past due to myalgias.  As such, his pravastatin 80 mg is insufficiently lowering his LDL cholesterol, and I would change him to Crestor 40 mg daily.  He will have a lipid panel obtained in 3 months and I will follow up with him regarding that.      Nextly, due to his atrial fibrillation with a CHADS-VASc score of 4, the patient should be anticoagulated.  However, he is having multiple falls due to his peripheral neuropathy and mechanical instability secondary to his osteoarthritis, status post bilateral knee replacements.  As such, I have reviewed data pertaining to Eliquis with the patient and his daughter.  As Eliquis has greater efficacy compared to warfarin in reducing stroke and systemic embolization, combined with lower rates of intracranial bleeding compared to warfarin utilization, I have elected to have the patient stop his warfarin and utilize Eliquis 5 mg p.o. b.i.d.       Also, we will be  obtaining a transthoracic echocardiogram on the patient, given the fact that he had aortic root dilation noted in 2007.  I am concerned that the patient may have evolved to have an ascending thoracic aortic aneurysm.        Finally, regarding his venous insufficiency, the patient would benefit from wearing compression hosiery.  To that end, I have prescribed Sigvaris 20-30 mm knee-high compressive hosiery.  I have advised the patient that he needs to wash his feet daily and visually inspect them to ensure that his ulcerations are not getting worse.  If they are, he is to come back and see me.  His venous insufficiency does need to be treated with compression.  However, in patients with peripheral artery disease which is worse than his appears to be numerically at least, this can sometimes lead to problems with worsening of tissue loss.  Given the fact that his YAMILEX is as high as it is, I feel that he is safe to utilize the above.  Nonetheless, he is advised to return to see me as soon as possible if he should develop worsening ulcerations in his feet.  Please do not hesitate to contact me if I may be of assistance regarding this or other matters moving forward into the future.         PING DIAZ MD             D: 2019   T: 2019   MT: JOSE MANUEL      Name:     MARISELA ROSENBERG   MRN:      [id number]        Account:      [id number]   :      1935           Visit Date:   2019      Document: [id number]       cc: Ry Shukla MD

## 2019-01-21 ENCOUNTER — ANCILLARY PROCEDURE (OUTPATIENT)
Dept: CARDIOLOGY | Facility: CLINIC | Age: 84
End: 2019-01-21
Payer: COMMERCIAL

## 2019-01-21 DIAGNOSIS — I77.810 AORTIC ROOT DILATION (H): ICD-10-CM

## 2019-01-21 PROCEDURE — 93306 TTE W/DOPPLER COMPLETE: CPT | Performed by: INTERNAL MEDICINE

## 2019-01-24 ENCOUNTER — TELEPHONE (OUTPATIENT)
Dept: OTHER | Facility: CLINIC | Age: 84
End: 2019-01-24

## 2019-01-24 NOTE — TELEPHONE ENCOUNTER
Prior Authorization Retail Medication Request    Medication/Dose:   ICD code (if different than what is on RX):    Previously Tried and Failed:  Pravastatin, simvastatin  Rationale:  had difficulty tolerating simvastatin in the past due to myalgias.  As such, his pravastatin 80 mg is insufficiently lowering his LDL cholesterol,    Insurance Name:  [id number]-[employer]  Insurance ID:  [id number]       Pharmacy Information (if different than what is on RX)  Name:    Phone:

## 2019-01-29 NOTE — TELEPHONE ENCOUNTER
Central Prior Authorization Team   Phone: [phone]      PA NOT NEEDED  Medication: rosuvastatub-PA NOT NEEDED  Insurance Company:    Pharmacy Filling the Rx:    Filling Pharmacy Phone:    Filling Pharmacy Fax:    Start Date: 1/22/2019    Called pharmacy to verify 3rd party billing information and was informed medication does not need a PA it went through insurance and patient has a zero copay.

## 2019-02-15 ENCOUNTER — ANTICOAGULATION THERAPY VISIT (OUTPATIENT)
Dept: NURSING | Facility: CLINIC | Age: 84
End: 2019-02-15
Payer: COMMERCIAL

## 2019-02-15 ENCOUNTER — TELEPHONE (OUTPATIENT)
Dept: FAMILY MEDICINE | Facility: CLINIC | Age: 84
End: 2019-02-15

## 2019-02-15 DIAGNOSIS — Z79.01 LONG TERM CURRENT USE OF ANTICOAGULANT THERAPY: ICD-10-CM

## 2019-02-15 DIAGNOSIS — I48.0 PAROXYSMAL ATRIAL FIBRILLATION (H): ICD-10-CM

## 2019-02-15 NOTE — TELEPHONE ENCOUNTER
Routing to PCP as heads up for appt on 2/20      Called and spoke with patient's daughter. Patient went to vascular doctor a couple of months ago for his PAD. They made a lot of medication changes and he is non-compliant with the changes. They changed his warfarin to Eliquis, put him on crestor. Also prescribed compression socks but she does not think that he got those. They also wanted him to do a Pad rehab referral, which he is not doing. I went through the vascular note and explained all the reasons for the changes, and she verbalized understanding but she does not think that patient is going to take them without talking with Dr. Shukla. I scheduled an appt with Dr. Shukla.     Note from vascular from 1/17/19:    I would also recommend that atherosclerotic risk factor reduction be maximized.  The patient has had difficulty tolerating simvastatin in the past due to myalgias.  As such, his pravastatin 80 mg is insufficiently lowering his LDL cholesterol, and I would change him to Crestor 40 mg daily.  He will have a lipid panel obtained in 3 months and I will follow up with him regarding that.      Nextly, due to his atrial fibrillation with a CHADS-VASc score of 4, the patient should be anticoagulated.  However, he is having multiple falls due to his peripheral neuropathy and mechanical instability secondary to his osteoarthritis, status post bilateral knee replacements.  As such, I have reviewed data pertaining to Eliquis with the patient and his daughter.  As Eliquis has greater efficacy compared to warfarin in reducing stroke and systemic embolization, combined with lower rates of intracranial bleeding compared to warfarin utilization, I have elected to have the patient stop his warfarin and utilize Eliquis 5 mg p.o. b.i.d.        Shun Guerrero RN

## 2019-02-15 NOTE — TELEPHONE ENCOUNTER
Reason for Call:  Other     Detailed comments: Ngozi is the patients daughter and she would like to talk to Dr Shukla about the patients medication     Phone Number Patient can be reached at: Other phone number:  [phone]    Best Time: any    Can we leave a detailed message on this number? YES    Call taken on 2/15/2019 at 8:32 AM by Ruby Matos

## 2019-04-18 DIAGNOSIS — N18.30 CKD (CHRONIC KIDNEY DISEASE) STAGE 3, GFR 30-59 ML/MIN (H): ICD-10-CM

## 2019-04-18 RX ORDER — LISINOPRIL 10 MG/1
TABLET ORAL
Qty: 30 TABLET | Refills: 0 | Status: SHIPPED | OUTPATIENT
Start: 2019-04-18 | End: 2019-05-01

## 2019-04-18 NOTE — TELEPHONE ENCOUNTER
"Requested Prescriptions   Pending Prescriptions Disp Refills     lisinopril (PRINIVIL/ZESTRIL) 10 MG tablet [Pharmacy Med Name: LISINOPRIL 10MG  TAB]  Last Written Prescription Date:  4/30/2018  Last Fill Quantity: 90 tabs,  # refills: 3   Last office visit: 1/10/2019 with prescribing provider:  Vazquez   Future Office Visit:   Next 5 appointments (look out 90 days)    Apr 24, 2019  1:40 PM CDT  Return Visit with Mitul Barrios MD  [facility] ([facility] [address]  [place] [address]  [phone]          90 tablet 3     Sig: TAKE 1 TABLET BY MOUTH ONCE DAILY       ACE Inhibitors (Including Combos) Protocol Passed - 4/18/2019  5:18 PM        Passed - Blood pressure under 140/90 in past 12 months     BP Readings from Last 3 Encounters:   01/17/19 122/74   01/10/19 111/65   01/04/19 116/60                 Passed - Recent (12 mo) or future (30 days) visit within the authorizing provider's specialty     Patient had office visit in the last 12 months or has a visit in the next 30 days with authorizing provider or within the authorizing provider's specialty.  See \"Patient Info\" tab in inbasket, or \"Choose Columns\" in Meds & Orders section of the refill encounter.              Passed - Medication is active on med list        Passed - Patient is age 18 or older        Passed - Normal serum creatinine on file in past 12 months     Recent Labs   Lab Test 05/03/18  1156   CR 1.21             Passed - Normal serum potassium on file in past 12 months     Recent Labs   Lab Test 05/03/18  1156   POTASSIUM 4.1               "

## 2019-04-19 NOTE — TELEPHONE ENCOUNTER
Medication is being filled for 1 time refill only due to:  Patient needs labs BMP. Future labs ordered BMP is already in Epic.     Encounter routed to team to reach out to patient to schedule lab only appointment.    Jocelyn David RN  [facility]

## 2019-04-22 ENCOUNTER — TELEPHONE (OUTPATIENT)
Dept: OTHER | Facility: CLINIC | Age: 84
End: 2019-04-22

## 2019-04-22 NOTE — TELEPHONE ENCOUNTER
Called patient left voice mail patient needs to reschedule lab visit. He has a appointment 4/24/2019 with Dr. Barrios for lab results.  Tita Ruiz MA

## 2019-05-01 ENCOUNTER — OFFICE VISIT (OUTPATIENT)
Dept: FAMILY MEDICINE | Facility: CLINIC | Age: 84
End: 2019-05-01
Payer: COMMERCIAL

## 2019-05-01 VITALS
BODY MASS INDEX: 35.44 KG/M2 | WEIGHT: 247 LBS | TEMPERATURE: 98.1 F | SYSTOLIC BLOOD PRESSURE: 118 MMHG | OXYGEN SATURATION: 99 % | DIASTOLIC BLOOD PRESSURE: 68 MMHG | HEART RATE: 77 BPM

## 2019-05-01 DIAGNOSIS — I10 HYPERTENSION GOAL BP (BLOOD PRESSURE) < 140/90: Primary | ICD-10-CM

## 2019-05-01 DIAGNOSIS — M1A.09X1 IDIOPATHIC CHRONIC GOUT OF MULTIPLE SITES WITH TOPHUS: ICD-10-CM

## 2019-05-01 DIAGNOSIS — I50.9 CONGESTIVE HEART FAILURE, UNSPECIFIED HF CHRONICITY, UNSPECIFIED HEART FAILURE TYPE (H): ICD-10-CM

## 2019-05-01 DIAGNOSIS — E78.5 HYPERLIPIDEMIA LDL GOAL <100: ICD-10-CM

## 2019-05-01 DIAGNOSIS — N18.30 CKD (CHRONIC KIDNEY DISEASE) STAGE 3, GFR 30-59 ML/MIN (H): ICD-10-CM

## 2019-05-01 DIAGNOSIS — E03.4 HYPOTHYROIDISM DUE TO ACQUIRED ATROPHY OF THYROID: ICD-10-CM

## 2019-05-01 DIAGNOSIS — I48.20 CHRONIC ATRIAL FIBRILLATION (H): ICD-10-CM

## 2019-05-01 DIAGNOSIS — I73.9 PVD (PERIPHERAL VASCULAR DISEASE) (H): ICD-10-CM

## 2019-05-01 DIAGNOSIS — I87.2 VENOUS STASIS DERMATITIS OF BOTH LOWER EXTREMITIES: ICD-10-CM

## 2019-05-01 LAB — HGB BLD-MCNC: 11.6 G/DL (ref 13.3–17.7)

## 2019-05-01 PROCEDURE — 36415 COLL VENOUS BLD VENIPUNCTURE: CPT | Performed by: FAMILY MEDICINE

## 2019-05-01 PROCEDURE — 82043 UR ALBUMIN QUANTITATIVE: CPT | Performed by: FAMILY MEDICINE

## 2019-05-01 PROCEDURE — 84443 ASSAY THYROID STIM HORMONE: CPT | Performed by: FAMILY MEDICINE

## 2019-05-01 PROCEDURE — 85018 HEMOGLOBIN: CPT | Performed by: FAMILY MEDICINE

## 2019-05-01 PROCEDURE — 84439 ASSAY OF FREE THYROXINE: CPT | Performed by: FAMILY MEDICINE

## 2019-05-01 PROCEDURE — 80048 BASIC METABOLIC PNL TOTAL CA: CPT | Performed by: FAMILY MEDICINE

## 2019-05-01 PROCEDURE — 80061 LIPID PANEL: CPT | Performed by: FAMILY MEDICINE

## 2019-05-01 PROCEDURE — 99214 OFFICE O/P EST MOD 30 MIN: CPT | Performed by: FAMILY MEDICINE

## 2019-05-01 PROCEDURE — 84460 ALANINE AMINO (ALT) (SGPT): CPT | Performed by: FAMILY MEDICINE

## 2019-05-01 RX ORDER — FUROSEMIDE 40 MG
40 TABLET ORAL DAILY
Qty: 90 TABLET | Refills: 3 | Status: SHIPPED | OUTPATIENT
Start: 2019-05-01 | End: 2020-06-01

## 2019-05-01 RX ORDER — LISINOPRIL 10 MG/1
10 TABLET ORAL DAILY
Qty: 90 TABLET | Refills: 3 | Status: SHIPPED | OUTPATIENT
Start: 2019-05-01 | End: 2020-03-26

## 2019-05-01 RX ORDER — ALLOPURINOL 300 MG/1
1 TABLET ORAL DAILY
Qty: 90 TABLET | Refills: 3 | Status: SHIPPED | OUTPATIENT
Start: 2019-05-01 | End: 2020-03-26 | Stop reason: DRUGHIGH

## 2019-05-01 RX ORDER — METOPROLOL SUCCINATE 100 MG/1
100 TABLET, EXTENDED RELEASE ORAL DAILY
Qty: 90 TABLET | Refills: 3 | Status: SHIPPED | OUTPATIENT
Start: 2019-05-01 | End: 2020-03-26

## 2019-05-01 RX ORDER — LEVOTHYROXINE SODIUM 50 UG/1
50 TABLET ORAL DAILY
Qty: 90 TABLET | Refills: 3 | Status: SHIPPED | OUTPATIENT
Start: 2019-05-01 | End: 2020-03-26

## 2019-05-01 NOTE — PROGRESS NOTES
SUBJECTIVE:   Israel Moyer is a 83 year old male who presents to clinic today for the following   health issues:      Hyperlipidemia Follow-Up      Rate your low fat/cholesterol diet?: good    Taking statin?  Yes, no muscle aches from statin    Other lipid medications/supplements?:  none    Hypertension Follow-up      Outpatient blood pressures are not being checked.    Low Salt Diet: no added salt    Hypothyroidism Follow-up      Since last visit, patient describes the following symptoms: Weight stable, no hair loss, no skin changes, no constipation, no loose stools      Amount of exercise or physical activity: 6-7 days/week for an average of 45-60 minutes    Problems taking medications regularly: No    Medication side effects: none    Diet: regular (no restrictions)        He was seen by vascular surgery. No intervention needed. Change to DOAC and Crestor. Tolerating well. May become cost prohibitive next year    Additional history: as documented    Reviewed  and updated as needed this visit by clinical staff  Tobacco  Allergies  Meds  Med Hx  Surg Hx  Fam Hx  Soc Hx        Reviewed and updated as needed this visit by Provider         BP Readings from Last 3 Encounters:   05/01/19 118/68   01/17/19 122/74   01/10/19 111/65    Wt Readings from Last 3 Encounters:   05/01/19 112 kg (247 lb)   01/17/19 110.7 kg (244 lb)   01/10/19 109.3 kg (241 lb)                    ROS:  CONSTITUTIONAL: NEGATIVE for fever, chills, change in weight  ENT/MOUTH: NEGATIVE for ear, mouth and throat problems  RESP: NEGATIVE for significant cough or SOB  CV: NEGATIVE for chest pain, palpitations or peripheral edema  MUSCULOSKELETAL: arthritis of hands and ankles. Multiple joints  PSYCHIATRIC: NEGATIVE for changes in mood or affect    OBJECTIVE:     /68 (BP Location: Right arm, Patient Position: Sitting, Cuff Size: Adult Large)   Pulse 77   Temp 98.1  F (36.7  C) (Oral)   Wt 112 kg (247 lb)   SpO2 99%   BMI 35.44 kg/m     Body mass index is 35.44 kg/m .   GENERAL:alert and no distress  EYES: Eyes grossly normal to inspection, PERRL and conjunctivae and sclerae normal  NECK: no adenopathy, no asymmetry, masses, or scars and thyroid normal to palpation  RESP: lungs clear to auscultation - no rales, rhonchi or wheezes  CV:irregular rate and rhythm, normal S1 S2, no S3 or S4, no murmur, click or rub,. Venous stasis changes of lower extremities. Ulcerations improved.   ABDOMEN: soft, nontender, no hepatosplenomegaly, no masses and bowel sounds normal  MS: arthritic changes of hands, ankles and feet  Diagnostic Test Results:  none     ASSESSMENT:       PLAN:     (I10) Hypertension goal BP (blood pressure) < 140/90  (primary encounter diagnosis)  Comment: controled  Plan: BASIC METABOLIC PANEL, furosemide (LASIX) 40 MG        tablet, metoprolol succinate ER (TOPROL-XL) 100        MG 24 hr tablet         Continue present medications      (I73.9) PVD (peripheral vascular disease) (H)  Comment: stabe  Plan: new medications noted and updated. Now on DOAC and Crestor. 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  Comment:   Plan: TSH WITH FREE T4 REFLEX, levothyroxine         (SYNTHROID/LEVOTHROID) 50 MCG tablet            (N18.3) CKD (chronic kidney disease) stage 3, GFR 30-59 ml/min (H)  Comment: stable  Plan: BASIC METABOLIC PANEL, Hemoglobin, Albumin         Random Urine Quantitative with Creat Ratio,         lisinopril (PRINIVIL/ZESTRIL) 10 MG tablet            (I48.2) Chronic atrial fibrillation (H)  Comment: stabl  Plan: metoprolol succinate ER (TOPROL-XL)  100 MG 24         hr tablet            (I50.9) Congestive heart failure, unspecified HF chronicity, unspecified heart failure type (H)  Comment: stable  Plan: ALT, Hemoglobin                  Patient Instructions     Orders Placed This Encounter     ALT     Last Lab Result: ALT (U/L)       Date                     Value                 05/03/2018               19               ----------     BASIC METABOLIC PANEL     Hemoglobin     Last Lab Result: Hemoglobin (g/dL)       Date                     Value                 05/03/2018               12.6 (L)         ----------     Lipid panel reflex to direct LDL Fasting     Last Lab Result: LDL Cholesterol Calculated (mg/dL)       Date                     Value                 05/03/2018               90               ----------     Order Specific Question:   Perform labs while fasting or non-fasting?     Answer:   Fasting     Albumin Random Urine Quantitative with Creat Ratio     This test includes a Creatinine Ratio.  Do not order [id number] (Creatinine Random Urine)  Last Lab Result: Albumin Urine mg/g Cr (mg/g Cr)       Date                     Value                 05/03/2018                                 Unable to calculate due to low value  ----------  Creatinine Urine (mg/dL)       Date                     Value                 05/03/2018               29               ----------     Order Specific Question:   Includes     Answer:   with Creat Ratio     TSH WITH FREE T4 REFLEX     Last Lab Result: TSH (mU/L)       Date                     Value                 05/03/2018               4.32 (H)         ----------     allopurinol (ZYLOPRIM) 300 MG tablet     Sig: Take 1 tablet (300 mg) by mouth daily     Dispense:  90 tablet     Refill:  3     furosemide (LASIX) 40 MG tablet     Sig: Take 1 tablet (40 mg) by mouth daily     Dispense:  90 tablet     Refill:  3     levothyroxine (SYNTHROID/LEVOTHROID) 50 MCG tablet     Sig: Take 1 tablet (50 mcg) by mouth daily      Dispense:  90 tablet     Refill:  3     lisinopril (PRINIVIL/ZESTRIL) 10 MG tablet     Sig: Take 1 tablet (10 mg) by mouth daily     Dispense:  90 tablet     Refill:  3     metoprolol succinate ER (TOPROL-XL) 100 MG 24 hr tablet     Sig: Take 1 tablet (100 mg) by mouth daily     Dispense:  90 tablet     Refill:  3     [facility]     Discharged by : Karen Zuniga MA    Paper scripts provided to patient : no     If you have any questions regarding your visit please contact your care team:     Team Gold                Clinic Hours Telephone Number     Dr. Ry Marshall, [facility]   7am-7pm  Monday - Thursday   7am-5pm  Fridays  [phone]   (Appointment scheduling available 24/7)     RN Line  [phone] option 2     Urgent Care - [place] and [place] - 11am-9pm Monday-Friday Saturday-Sunday- 9am-5pm     [place] -   5pm-9pm Monday-Friday Saturday-Sunday- 9am-5pm    [phone] - [place]    [phone] - [place]     For a Price Quote for your services, please call our Consumer Price Line at [phone].     What options do I have for visits at the clinic other than the traditional office visit?     To expand how we care for you, many of our providers are utilizing electronic visits (e-visits) and telephone visits, when medically appropriate, for interactions with their patients rather than a visit in the clinic. We also offer nurse visits for many medical concerns. Just like any other service, we will bill your insurance company for this type of visit based on time spent on the phone with your provider. Not all insurance companies cover these visits. Please check with your medical insurance if this type of visit is covered. You will be responsible for any charges that are not paid by your insurance.     E-visits via [email or web address]: generally incur a $45.00 fee.     Telephone visits:  Time spent on the phone: *charged based on time that is  spent on the phone in increments of 10 minutes. Estimated cost:   5-10 mins $30.00   11-20 mins. $59.00   21-30 mins. $85.00       Use [employer] (secure email communication and access to your chart) to send your primary care provider a message or make an appointment. Ask someone on your Team how to sign up for [employer].     As always, Thank you for trusting us with your health care needs!      [facility]:    Scheduling Appointments  [place], [place], [facility]  Call: [phone]    [place], [facility]  Call: [phone]    [facility]  Call: [phone]    For Gastroenterology referrals   [facility] Gastroenterology   Clinics and Surgery Center, 4th Floor   [address] [place] [address]   Appointments: [phone]    WHERE TO GO FOR CARE?    Clinic    Make an appointment if you:       Are sick (cold, cough, flu, sore throat, earache or in pain).       Have a small injury (sprain, small cut, burn or broken bone).       Need a physical exam, Pap smear, vaccine or prescription refill.       Have questions about your health or medicines.    To reach us:      Call [phone] ([phone]). Open 24 hours every day. (For counseling services, call [phone].)    Log into Mobile Multimedia at [email or web address]. (Visit [email or web address] to create an account.) Hospital emergency room    An emergency is a serious or life- threatening problem that must be treated right away.    Call 424 or get to the hospital if you have:      Very bad or sudden:            - Chest pain or pressure         - Bleeding         - Head or belly pain         - Dizziness or trouble seeing, walking or                          Speaking      Problems breathing      Blood in your vomit or you are coughing up blood      A major injury (knocked out, loss of a finger or limb, rape, broken bone protruding from skin)    A mental health crisis. (Or call the Mental Health  Crisis line at 1-371.690.4871 or Suicide Prevention Hotline at 1-542.111.8026.)    Open 24 hours every day. You don't need an appointment.     Urgent care    Visit urgent care for sickness or small injuries when the clinic is closed. You don't need an appointment. To check hours or find an urgent care near you, visit [email or web address]. Online care    Get online [facility] from [facility] for more than 70 common problems, like colds, allergies and infections. Open 24 hours every day at:   [email or web address]   Need help deciding?    For advice about where to be seen, you may call your clinic and ask to speak with a nurse. We're here for you 24 hours every day.         If you are deaf or hard of hearing, please let us know. We provide many free services including sign language interpreters, oral interpreters, TTYs, telephone amplifiers, note takers and written materials.                 Ry Shukla MD, MD  [facility]

## 2019-05-01 NOTE — PATIENT INSTRUCTIONS
Orders Placed This Encounter     ALT     Last Lab Result: ALT (U/L)       Date                     Value                 05/03/2018               19               ----------     BASIC METABOLIC PANEL     Hemoglobin     Last Lab Result: Hemoglobin (g/dL)       Date                     Value                 05/03/2018               12.6 (L)         ----------     Lipid panel reflex to direct LDL Fasting     Last Lab Result: LDL Cholesterol Calculated (mg/dL)       Date                     Value                 05/03/2018               90               ----------     Order Specific Question:   Perform labs while fasting or non-fasting?     Answer:   Fasting     Albumin Random Urine Quantitative with Creat Ratio     This test includes a Creatinine Ratio.  Do not order [id number] (Creatinine Random Urine)  Last Lab Result: Albumin Urine mg/g Cr (mg/g Cr)       Date                     Value                 05/03/2018                                 Unable to calculate due to low value  ----------  Creatinine Urine (mg/dL)       Date                     Value                 05/03/2018               29               ----------     Order Specific Question:   Includes     Answer:   with Creat Ratio     TSH WITH FREE T4 REFLEX     Last Lab Result: TSH (mU/L)       Date                     Value                 05/03/2018               4.32 (H)         ----------     allopurinol (ZYLOPRIM) 300 MG tablet     Sig: Take 1 tablet (300 mg) by mouth daily     Dispense:  90 tablet     Refill:  3     furosemide (LASIX) 40 MG tablet     Sig: Take 1 tablet (40 mg) by mouth daily     Dispense:  90 tablet     Refill:  3     levothyroxine (SYNTHROID/LEVOTHROID) 50 MCG tablet     Sig: Take 1 tablet (50 mcg) by mouth daily     Dispense:  90 tablet     Refill:  3     lisinopril (PRINIVIL/ZESTRIL) 10 MG tablet     Sig: Take 1 tablet (10 mg) by mouth daily     Dispense:  90 tablet     Refill:  3     metoprolol succinate ER (TOPROL-XL) 100 MG 24 hr  tablet     Sig: Take 1 tablet (100 mg) by mouth daily     Dispense:  90 tablet     Refill:  3     [facility]     Discharged by : Karen Zuniga MA    Paper scripts provided to patient : no     If you have any questions regarding your visit please contact your care team:     Team Gold                Clinic Hours Telephone Number     Dr. Ry Marshall, [place]   7am-7pm  Monday - Thursday   7am-5pm  Fridays  [phone]   (Appointment scheduling available 24/7)     RN Line  [phone] option 2     [facility] [place] [facility] - 11am-9pm Monday-Friday Saturday-Sunday- 9am-5pm     [place] -   5pm-9pm Monday-Friday Saturday-Sunday- 9am-5pm    [phone] - [place]    [phone] - [place]     For a Price Quote for your services, please call our [employer] Price Line at [phone].     What options do I have for visits at the clinic other than the traditional office visit?     To expand how we care for you, many of our providers are utilizing electronic visits (e-visits) and telephone visits, when medically appropriate, for interactions with their patients rather than a visit in the clinic. We also offer nurse visits for many medical concerns. Just like any other service, we will bill your insurance company for this type of visit based on time spent on the phone with your provider. Not all insurance companies cover these visits. Please check with your medical insurance if this type of visit is covered. You will be responsible for any charges that are not paid by your insurance.     E-visits via [employer]: generally incur a $45.00 fee.     Telephone visits:  Time spent on the phone: *charged based on time that is spent on the phone in increments of 10 minutes. Estimated cost:   5-10 mins $30.00   11-20 mins. $59.00   21-30 mins. $85.00       Use [employer] (secure email communication and access to your chart) to send your primary  care provider a message or make an appointment. Ask someone on your Team how to sign up for [employer].     As always, Thank you for trusting us with your health care needs!      [facility]:    Scheduling Appointments  [place], [place], [facility] [place]  Call: [phone]    [place] [facility]  Call: [phone]    [facility]  Call: [phone]    For Gastroenterology referrals   [facility] Gastroenterology   Clinics and Surgery [place], 4th Floor   [address] [place] [address]   Appointments: [phone]    WHERE TO GO FOR CARE?    Clinic    Make an appointment if you:       Are sick (cold, cough, flu, sore throat, earache or in pain).       Have a small injury (sprain, small cut, burn or broken bone).       Need a physical exam, Pap smear, vaccine or prescription refill.       Have questions about your health or medicines.    To reach us:      Call [phone] ([phone]). Open 24 hours every day. (For counseling services, call [phone].)    Log into [employer] at [email or web address]. (Visit [email or web address] to create an account.) Hospital emergency room    An emergency is a serious or life- threatening problem that must be treated right away.    Call 371 or get to the hospital if you have:      Very bad or sudden:            - Chest pain or pressure         - Bleeding         - Head or belly pain         - Dizziness or trouble seeing, walking or                          Speaking      Problems breathing      Blood in your vomit or you are coughing up blood      A major injury (knocked out, loss of a finger or limb, rape, broken bone protruding from skin)    A mental health crisis. (Or call the Mental Health Crisis line at 1-247.808.6796 or Suicide Prevention Hotline at 1-222.923.4631.)    Open 24 hours every day. You don't need an appointment.     Urgent care    Visit urgent care for sickness or small injuries when the clinic  is closed. You don't need an appointment. To check hours or find an urgent care near you, visit [email or web address]. Online care    Get online care from [facility] for more than 70 common problems, like colds, allergies and infections. Open 24 hours every day at:   [email or web address]   Need help deciding?    For advice about where to be seen, you may call your clinic and ask to speak with a nurse. We're here for you 24 hours every day.         If you are deaf or hard of hearing, please let us know. We provide many free services including sign language interpreters, oral interpreters, TTYs, telephone amplifiers, note takers and written materials.

## 2019-05-02 LAB
ALT SERPL W P-5'-P-CCNC: 17 U/L (ref 0–70)
ANION GAP SERPL CALCULATED.3IONS-SCNC: 10 MMOL/L (ref 3–14)
BUN SERPL-MCNC: 34 MG/DL (ref 7–30)
CALCIUM SERPL-MCNC: 9.4 MG/DL (ref 8.5–10.1)
CHLORIDE SERPL-SCNC: 105 MMOL/L (ref 94–109)
CHOLEST SERPL-MCNC: 132 MG/DL
CO2 SERPL-SCNC: 24 MMOL/L (ref 20–32)
CREAT SERPL-MCNC: 1.42 MG/DL (ref 0.66–1.25)
CREAT UR-MCNC: 20 MG/DL
GFR SERPL CREATININE-BSD FRML MDRD: 46 ML/MIN/{1.73_M2}
GLUCOSE SERPL-MCNC: 92 MG/DL (ref 70–99)
HDLC SERPL-MCNC: 40 MG/DL
LDLC SERPL CALC-MCNC: 66 MG/DL
MICROALBUMIN UR-MCNC: <5 MG/L
MICROALBUMIN/CREAT UR: NORMAL MG/G CR (ref 0–17)
NONHDLC SERPL-MCNC: 92 MG/DL
POTASSIUM SERPL-SCNC: 4 MMOL/L (ref 3.4–5.3)
SODIUM SERPL-SCNC: 139 MMOL/L (ref 133–144)
T4 FREE SERPL-MCNC: 0.87 NG/DL (ref 0.76–1.46)
TRIGL SERPL-MCNC: 130 MG/DL
TSH SERPL DL<=0.005 MIU/L-ACNC: 4.98 MU/L (ref 0.4–4)

## 2019-07-09 ENCOUNTER — ANTICOAGULATION THERAPY VISIT (OUTPATIENT)
Dept: FAMILY MEDICINE | Facility: CLINIC | Age: 84
End: 2019-07-09

## 2019-08-07 ENCOUNTER — TELEPHONE (OUTPATIENT)
Dept: FAMILY MEDICINE | Facility: CLINIC | Age: 84
End: 2019-08-07
Payer: COMMERCIAL

## 2019-08-07 DIAGNOSIS — I77.810 AORTIC ROOT DILATION (H): Primary | ICD-10-CM

## 2019-08-07 NOTE — TELEPHONE ENCOUNTER
Reason for Call:  Other / Question (apixaban ANTICOAGULANT (ELIQUIS)    Detailed comments: Patient called and requested a call back from someone in his care team to discuss how he would have to follow up since his medication co-pay went up to 1,600 a month and he cannot afford it.  Patient is requesting a call back to discuss if a different medication could be prescribed without him having to be seeing since he saw his PCP in May and he is also on sabbatical. Patient also stated he was on Warfarin for 17 years and wonders if he could be put back on it.    Phone Number Patient can be reached at: Home number on file [phone] (home)    Best Time: ASAP    Can we leave a detailed message on this number? YES    Call taken on 8/7/2019 at 3:09 PM by Lolly Chaudhari

## 2019-08-07 NOTE — TELEPHONE ENCOUNTER
Routing patient request to change from Eliquis to warfarin to provider. Patient reports he usually pays $47 for co-pay, but for some reason it is now over $300. He was previously prescribed warfarin, then saw Dr. Barrios from [facility] in January, and patient was placed on Eliquis at that time. Note indicates that patient should return in 3 months, but he has not done so. PCP Dr. Shukla is currently out on sabbatical until November.  I instructed patient to reach out to prescribing provider regarding this and will route this encounter to Dr. Barrios as well.    Holly Orantes, RN

## 2019-08-08 ENCOUNTER — TELEPHONE (OUTPATIENT)
Dept: OTHER | Facility: CLINIC | Age: 84
End: 2019-08-08

## 2019-08-08 DIAGNOSIS — I73.9 PAD (PERIPHERAL ARTERY DISEASE) (H): Primary | ICD-10-CM

## 2019-08-08 DIAGNOSIS — E78.5 HYPERLIPIDEMIA LDL GOAL <70: ICD-10-CM

## 2019-08-08 NOTE — TELEPHONE ENCOUNTER
Patient called stating that his anticoagulation was changed from warfarin to Eliquis.  He stated that until today, his co-pay had been $141 for 3 months and now it is over $300.  He agreed to get a 30 day supply of Eliquis and come in to visit Dr. Barrios to discuss alternatives to Eliquis.      Routing to scheduling to contact patient for next available (green slot) appointment.  Please ask patient to have fasting labs drawn 7-10 days prior to appointment.  Orders updated in Epic.      Viry Felton RN BSN

## 2019-08-08 NOTE — TELEPHONE ENCOUNTER
He should see me to discuss the change in meds. Eliquis is superior to warfarin in stroke prevention in patients who have AF. He was supposed to have seen me in April but never did. Please schedule a f/u visit. Please make sure any tests I may have referenced in my last note are done before he sees me.

## 2019-08-08 NOTE — TELEPHONE ENCOUNTER
Per OV on 1/17/19:  Result Notes for Echocardiogram Complete     Notes recorded by Mitul Barrios MD on 1/22/2019 at 2:55 PM CST  aortic root  is not concerningly enlarged. He should simply have an echo repeated in one year.    Placed order for echo and for follow up visit.    Routing to Dr. Barrios for signature.  Please route back for follow up.

## 2019-08-08 NOTE — TELEPHONE ENCOUNTER
Patient is scheduled for labs on 08/28/19 and a follow up appointment with Dr Barrios on 09/04/19.

## 2019-08-28 DIAGNOSIS — I48.20 CHRONIC ATRIAL FIBRILLATION (H): ICD-10-CM

## 2019-08-28 DIAGNOSIS — I50.9 CONGESTIVE HEART FAILURE, UNSPECIFIED HF CHRONICITY, UNSPECIFIED HEART FAILURE TYPE (H): ICD-10-CM

## 2019-08-28 DIAGNOSIS — I10 HYPERTENSION GOAL BP (BLOOD PRESSURE) < 140/90: ICD-10-CM

## 2019-08-28 DIAGNOSIS — N18.30 CKD (CHRONIC KIDNEY DISEASE) STAGE 3, GFR 30-59 ML/MIN (H): ICD-10-CM

## 2019-08-28 DIAGNOSIS — E78.5 HYPERLIPIDEMIA LDL GOAL <70: ICD-10-CM

## 2019-08-28 DIAGNOSIS — Z79.01 LONG TERM CURRENT USE OF ANTICOAGULANT THERAPY: ICD-10-CM

## 2019-08-28 LAB
ANION GAP SERPL CALCULATED.3IONS-SCNC: 9 MMOL/L (ref 3–14)
BUN SERPL-MCNC: 45 MG/DL (ref 7–30)
CALCIUM SERPL-MCNC: 9 MG/DL (ref 8.5–10.1)
CHLORIDE SERPL-SCNC: 107 MMOL/L (ref 94–109)
CHOLEST SERPL-MCNC: 124 MG/DL
CO2 SERPL-SCNC: 24 MMOL/L (ref 20–32)
CREAT SERPL-MCNC: 1.59 MG/DL (ref 0.66–1.25)
ERYTHROCYTE [DISTWIDTH] IN BLOOD BY AUTOMATED COUNT: 14.7 % (ref 10–15)
GFR SERPL CREATININE-BSD FRML MDRD: 40 ML/MIN/{1.73_M2}
GLUCOSE SERPL-MCNC: 123 MG/DL (ref 70–99)
HCT VFR BLD AUTO: 36.4 % (ref 40–53)
HDLC SERPL-MCNC: 44 MG/DL
HGB BLD-MCNC: 12.1 G/DL (ref 13.3–17.7)
LDLC SERPL CALC-MCNC: 64 MG/DL
MCH RBC QN AUTO: 32.2 PG (ref 26.5–33)
MCHC RBC AUTO-ENTMCNC: 33.2 G/DL (ref 31.5–36.5)
MCV RBC AUTO: 97 FL (ref 78–100)
NONHDLC SERPL-MCNC: 80 MG/DL
PLATELET # BLD AUTO: 155 10E9/L (ref 150–450)
POTASSIUM SERPL-SCNC: 4.6 MMOL/L (ref 3.4–5.3)
RBC # BLD AUTO: 3.76 10E12/L (ref 4.4–5.9)
SODIUM SERPL-SCNC: 140 MMOL/L (ref 133–144)
TRIGL SERPL-MCNC: 79 MG/DL
WBC # BLD AUTO: 6.5 10E9/L (ref 4–11)

## 2019-08-28 PROCEDURE — 85027 COMPLETE CBC AUTOMATED: CPT | Performed by: INTERNAL MEDICINE

## 2019-08-28 PROCEDURE — 36415 COLL VENOUS BLD VENIPUNCTURE: CPT | Performed by: INTERNAL MEDICINE

## 2019-08-28 PROCEDURE — 80048 BASIC METABOLIC PNL TOTAL CA: CPT | Performed by: INTERNAL MEDICINE

## 2019-08-28 PROCEDURE — 80061 LIPID PANEL: CPT | Performed by: INTERNAL MEDICINE

## 2019-09-04 ENCOUNTER — OFFICE VISIT (OUTPATIENT)
Dept: OTHER | Facility: CLINIC | Age: 84
End: 2019-09-04
Attending: INTERNAL MEDICINE
Payer: COMMERCIAL

## 2019-09-04 VITALS
SYSTOLIC BLOOD PRESSURE: 121 MMHG | BODY MASS INDEX: 32.93 KG/M2 | DIASTOLIC BLOOD PRESSURE: 75 MMHG | OXYGEN SATURATION: 98 % | HEIGHT: 70 IN | WEIGHT: 230 LBS | HEART RATE: 56 BPM | RESPIRATION RATE: 14 BRPM

## 2019-09-04 DIAGNOSIS — I48.0 PAROXYSMAL ATRIAL FIBRILLATION (H): ICD-10-CM

## 2019-09-04 DIAGNOSIS — I87.2 VENOUS (PERIPHERAL) INSUFFICIENCY: ICD-10-CM

## 2019-09-04 DIAGNOSIS — I73.9 PAD (PERIPHERAL ARTERY DISEASE) (H): Primary | ICD-10-CM

## 2019-09-04 DIAGNOSIS — N18.30 CHRONIC KIDNEY DISEASE, STAGE 3 (MODERATE): ICD-10-CM

## 2019-09-04 DIAGNOSIS — I77.810 AORTIC ROOT DILATION (H): ICD-10-CM

## 2019-09-04 DIAGNOSIS — E78.5 HYPERLIPIDEMIA LDL GOAL <70: ICD-10-CM

## 2019-09-04 PROCEDURE — 99214 OFFICE O/P EST MOD 30 MIN: CPT | Mod: ZP | Performed by: INTERNAL MEDICINE

## 2019-09-04 PROCEDURE — G0463 HOSPITAL OUTPT CLINIC VISIT: HCPCS

## 2019-09-04 RX ORDER — WARFARIN SODIUM 5 MG/1
TABLET ORAL
Qty: 10 TABLET | Refills: 3 | Status: SHIPPED | OUTPATIENT
Start: 2019-09-04 | End: 2019-09-16 | Stop reason: DRUGHIGH

## 2019-09-04 ASSESSMENT — MIFFLIN-ST. JEOR: SCORE: 1754.52

## 2019-09-04 NOTE — PROGRESS NOTES
"Israel Moyer is a 83 year old male who presents for:  Chief Complaint   Patient presents with     RECHECK     follow up - labs done 08/28/19 *LMB 08/08/19        Vitals:    Vitals:    09/04/19 0940   BP: 131/75   BP Location: Right arm   Patient Position: Chair   Cuff Size: Adult Regular   Pulse: 56   Resp: 14   SpO2: 98%   Weight: 230 lb (104.3 kg)   Height: 5' 10\" (1.778 m)       BMI:  Estimated body mass index is 33 kg/m  as calculated from the following:    Height as of this encounter: 5' 10\" (1.778 m).    Weight as of this encounter: 230 lb (104.3 kg).    Pain Score:  Data Unavailable        Vernon Romero CMA    "

## 2019-09-04 NOTE — PROGRESS NOTES
Israel Moyer is a 83 year old male who is presenting at the current time to discuss his diagnosi(es) of        PAD (peripheral artery disease) (H)  Hyperlipidemia LDL goal <70  Venous (peripheral) insufficiency  Aortic root dilation (H)  Paroxysmal atrial fibrillation (H)  Chronic kidney disease, stage 3 (moderate) (H) .      Review Of Systems  Skin: negative  Eyes: negative  Ears/Nose/Throat: negative  Respiratory: No shortness of breath, dyspnea on exertion, cough, or hemoptysis  Cardiovascular: negative  Gastrointestinal: negative  Genitourinary: negative  Musculoskeletal: negative  Neurologic: negative  Psychiatric: negative  Hematologic/Lymphatic/Immunologic: negative  Endocrine: negative     PAST MEDICAL HISTORY:                  Past Medical History:   Diagnosis Date     Atrial fibrillation (H) 2004    medically managed     Congestive heart failure, unspecified 2004    Dr. Wolfe     Essential hypertension, benign      Generalized osteoarthrosis, unspecified site      Gout, unspecified     followed by Dr. Dami MIRELES Rheum     Pure hypercholesterolemia ~2004     Unspecified disorder resulting from impaired renal function      Unspecified hypothyroidism ~2004       PAST SURGICAL HISTORY:                  Past Surgical History:   Procedure Laterality Date     C APPENDECTOMY       C TOTAL KNEE ARTHROPLASTY  2000    right     CARPAL TUNNEL RELEASE RT/LT  2006    RIGHT     HC COLONOSCOPY THRU STOMA, DIAGNOSTIC  1977    no symptoms; worked at [employer] at the time.     JOINT REPLACEMTN, KNEE RT/LT  ~2005    Joint Replacement knee LT       CURRENT MEDICATIONS:                  Current Outpatient Medications   Medication Sig Dispense Refill     allopurinol (ZYLOPRIM) 300 MG tablet Take 1 tablet (300 mg) by mouth daily 90 tablet 3     furosemide (LASIX) 40 MG tablet Take 1 tablet (40 mg) by mouth daily 90 tablet 3     levothyroxine (SYNTHROID/LEVOTHROID) 50 MCG tablet Take 1 tablet (50 mcg) by mouth daily 90  tablet 3     lisinopril (PRINIVIL/ZESTRIL) 10 MG tablet Take 1 tablet (10 mg) by mouth daily 90 tablet 3     metoprolol succinate ER (TOPROL-XL) 100 MG 24 hr tablet Take 1 tablet (100 mg) by mouth daily 90 tablet 3     rosuvastatin (CRESTOR) 40 MG tablet Take 1 tablet (40 mg) by mouth daily 90 tablet 3     warfarin (COUMADIN) 5 MG tablet 5 mg PO on 9-4-19 and 9-5-19, with further dosing to be dependent upon INR 9-6-19 at anticoagulation clinic. 10 tablet 3       ALLERGIES:                  Allergies   Allergen Reactions     Oxycodone      nausea     Simvastatin Cramps       SOCIAL HISTORY:                  Social History     Socioeconomic History     Marital status:      Spouse name: Not on file     Number of children: 5     Years of education: 14     Highest education level: Not on file   Occupational History     Occupation:    Social Needs     Financial resource strain: Not on file     Food insecurity:     Worry: Not on file     Inability: Not on file     Transportation needs:     Medical: Not on file     Non-medical: Not on file   Tobacco Use     Smoking status: Never Smoker     Smokeless tobacco: Never Used   Substance and Sexual Activity     Alcohol use: Yes     Comment: not much     Drug use: No     Sexual activity: Not Currently   Lifestyle     Physical activity:     Days per week: Not on file     Minutes per session: Not on file     Stress: Not on file   Relationships     Social connections:     Talks on phone: Not on file     Gets together: Not on file     Attends [employer] service: Not on file     Active member of club or organization: Not on file     Attends meetings of clubs or organizations: Not on file     Relationship status: Not on file     Intimate partner violence:     Fear of current or ex partner: Not on file     Emotionally abused: Not on file     Physically abused: Not on file     Forced sexual activity: Not on file   Other Topics Concern     Parent/sibling w/ CABG, MI or  angioplasty before 65F 55M? Yes   Social History Narrative     Not on file       FAMILY HISTORY:                   Family History   Problem Relation Age of Onset     Hypertension Mother      C.A.D. Father      Diabetes Father      Hypertension Father      Hypertension Sister      Cerebrovascular Disease No family hx of      Cancer - colorectal No family hx of      Prostate Cancer No family hx of          Physical exam Reveals:    O/P: WNL  HEENT: WNL  NECK: No JVD, thyromegaly, or lymphadenopathy  HEART:irr irr, no murmurs, gallops, or rubs  LUNGS: CTA bilaterally without rales, wheezes, or rhonchi  GI: NABS, nondistended, nontender, soft  EXT:without cyanosis, clubbing, or edema  NEURO: nonfocal  : no flank tenderness      Component      Latest Ref Rng & Units 5/3/2018 5/1/2019 8/28/2019   Sodium      133 - 144 mmol/L 138 139 140   Potassium      3.4 - 5.3 mmol/L 4.1 4.0 4.6   Chloride      94 - 109 mmol/L 105 105 107   Carbon Dioxide      20 - 32 mmol/L 23 24 24   Anion Gap      3 - 14 mmol/L 10 10 9   Glucose      70 - 99 mg/dL 93 92 123 (H)   Urea Nitrogen      7 - 30 mg/dL 25 34 (H) 45 (H)   Creatinine      0.66 - 1.25 mg/dL 1.21 1.42 (H) 1.59 (H)   GFR Estimate      >60 mL/min/1.73:m2 58 (L) 46 (L) 40 (L)   GFR Estimate If Black      >60 mL/min/1.73:m2 70 53 (L) 46 (L)   Calcium      8.5 - 10.1 mg/dL 8.6 9.4 9.0   WBC      4.0 - 11.0 10e9/L   6.5   RBC Count      4.4 - 5.9 10e12/L   3.76 (L)   Hemoglobin      13.3 - 17.7 g/dL   12.1 (L)   Hematocrit      40.0 - 53.0 %   36.4 (L)   MCV      78 - 100 fl   97   MCH      26.5 - 33.0 pg   32.2   MCHC      31.5 - 36.5 g/dL   33.2   RDW      10.0 - 15.0 %   14.7   Platelet Count      150 - 450 10e9/L   155   Cholesterol      <200 mg/dL 162  124   Triglycerides      <150 mg/dL 169 (H)  79   HDL Cholesterol      >39 mg/dL 38 (L)  44   LDL Cholesterol Calculated      <100 mg/dL 90  64   Non HDL Cholesterol      <130 mg/dL 124  80       A/P:    (I73.9) PAD (peripheral  artery disease) (H)  (primary encounter diagnosis)  Comment: He has healed traumatic shin wounds  Plan: RTC prn           (E78.5) Hyperlipidemia LDL goal <70  Comment: at goal  Plan: continue Crestor      (I77.810) Aortic root dilation (H)  Comment: 41 mm in size  Plan: check TTE in 1-2020, consider less frequent TTE checks in future given his age based upon those results    (I48.0) Paroxysmal atrial fibrillation (H)  Comment: he is on Eliquis at my instruction. Depsite knowing of its superior efficacy and lower bleeding rates, he wishes to start warfarin due to its lower cost   Plan:  warfarin (COUMADIN) 5 MG tablet, INR CLINIC         REFERRAL    (N18.3) Chronic kidney disease, stage 3 (moderate) (H)  Comment: Cr is tracking up  Plan: I advised he see his PCP to ascertain if he needs to remain on Lasix, which is the more likely pharmacologic culprit from a worsening CKD perspective. I would keep him on lisinopril due to decreased rate of aneurysmal expansion associated with dual use of beta blockade and ace inhibition.

## 2019-09-06 ENCOUNTER — ANTICOAGULATION THERAPY VISIT (OUTPATIENT)
Dept: NURSING | Facility: CLINIC | Age: 84
End: 2019-09-06
Payer: COMMERCIAL

## 2019-09-06 DIAGNOSIS — I48.91 ATRIAL FIBRILLATION (H): Primary | ICD-10-CM

## 2019-09-06 LAB — INR POINT OF CARE: 1.3 (ref 0.86–1.14)

## 2019-09-06 NOTE — PATIENT INSTRUCTIONS
Please call [phone] for questions.    New Hours at [facility]  Monday  730-330 pm  Tuesday 10:00- 6 pm   Friday     12:30- 4:30 pm  Closed on Wed and Thursday

## 2019-09-06 NOTE — PROGRESS NOTES
ANTICOAGULATION INITIAL CLINIC VISIT    Patient Name:  Israel Moyer  Date:  2019  Referred by: dr Barrios  Contact Type:  Face to Face    SUBJECTIVE:  Coumadin education was completed today.  Topics covered include:  -Introduction to coumadin  -Proper Administration  -INR Testing  -Sign/Symptoms of Bleeding  -Signs/Symptoms of Clot Formation or Stroke  -Dietary Intake of Vitamin K  -Drug Interactions  -Anticoagulation Identification (bracelet, necklace or wallet card)  -Future Surgery  -Effects of Alcohol, Tobacco, and Exercise on Coumadin    Coumadin Education Booklet and Coumadin Identification Wallet Card were given to the patient.          OBJECTIVE    INR Protime   Date Value Ref Range Status   2019 1.3 (A) 0.86 - 1.14 Final       ASSESSMENT / PLAN  INR assessment SUB    Recheck INR In: 3 DAYS    INR Location Clinic      Anticoagulation Summary  As of 2019    INR goal:   2.0-3.0   TTR:   75.2 % (3.4 y)   INR used for dosin.3! (2019)   Warfarin maintenance plan:   5 mg (5 mg x 1) every Wed; 2.5 mg (2.5 mg x 1) all other days   Full warfarin instructions:   : 5 mg; : 5 mg; : 5 mg; Otherwise 5 mg every Wed; 2.5 mg all other days   Weekly warfarin total:   20 mg   Plan last modified:   Mel Rasmussen RN (2019)   Next INR check:   2019   Target end date:            Anticoagulation Episode Summary     INR check location:       Preferred lab:       Send INR reminders to:   DELIO DAMON    Comments:               See the Encounter Report to view Anticoagulation Flowsheet and Dosing Calendar (Go to Encounters tab in chart review, and find the Anticoagulation Therapy Visit)    Some signs and symptoms of clots include: pain or tenderness in arm or leg, swelling in arm or leg, changes in skin color, or area is warm to touch, shortness or breath, trouble breathing.  Numbness or weakness especially on 1 side of the body, sudden trouble speaking or swallowing, sudden  trouble seeing, sudden confusion, dizzy spells or headache.  If you have these please call 911 or seek medical care immediately.      Mel Rasmussen RN

## 2019-09-09 ENCOUNTER — ANTICOAGULATION THERAPY VISIT (OUTPATIENT)
Dept: NURSING | Facility: CLINIC | Age: 84
End: 2019-09-09
Payer: COMMERCIAL

## 2019-09-09 DIAGNOSIS — Z79.01 LONG TERM CURRENT USE OF ANTICOAGULANT THERAPY: Primary | ICD-10-CM

## 2019-09-09 LAB — INR POINT OF CARE: 2.2 (ref 0.86–1.14)

## 2019-09-09 PROCEDURE — 85610 PROTHROMBIN TIME: CPT | Mod: QW

## 2019-09-09 PROCEDURE — 36416 COLLJ CAPILLARY BLOOD SPEC: CPT

## 2019-09-09 PROCEDURE — 99207 ZZC NO CHARGE NURSE ONLY: CPT

## 2019-09-09 RX ORDER — WARFARIN SODIUM 2.5 MG/1
2.5 TABLET ORAL DAILY
Qty: 30 TABLET | Refills: 1 | Status: SHIPPED | OUTPATIENT
Start: 2019-09-09 | End: 2019-09-16

## 2019-09-09 NOTE — PROGRESS NOTES
ANTICOAGULATION FOLLOW-UP CLINIC VISIT    Patient Name:  Israel Moyer  Date:  2019  Contact Type:  Face to Face    SUBJECTIVE: new start up trying dose he ended last on 6 months ago.  Patient Findings     Comments:   Bleeding Signs/Symptoms: NO  Thromboembolic Signs/Symptoms: NO     Medication Changes:  NO  Dietary Changes:  NO  Bacterial/Viral Infection: NO     Missed Coumadin Doses: NO  Other Concerns:  NO          Clinical Outcomes     Negatives:   Major bleeding event, Thromboembolic event, Anticoagulation-related hospital admission, Anticoagulation-related ED visit, Anticoagulation-related fatality    Comments:   Bleeding Signs/Symptoms: NO  Thromboembolic Signs/Symptoms: NO     Medication Changes:  NO  Dietary Changes:  NO  Bacterial/Viral Infection: NO     Missed Coumadin Doses: NO  Other Concerns:  NO             OBJECTIVE    INR Protime   Date Value Ref Range Status   2019 2.2 (A) 0.86 - 1.14 Final       ASSESSMENT / PLAN  INR assessment THER    Recheck INR In: 1 WEEK    INR Location Clinic      Anticoagulation Summary  As of 2019    INR goal:   2.0-3.0   TTR:   --   INR used for dosin.2 (2019)   Warfarin maintenance plan:   5 mg (5 mg x 1) every Wed; 2.5 mg (2.5 mg x 1) all other days   Full warfarin instructions:   5 mg every Wed; 2.5 mg all other days   Weekly warfarin total:   20 mg   Plan last modified:   Mel Rasmussen RN (2019)   Next INR check:   2019   Target end date:            Anticoagulation Episode Summary     INR check location:       Preferred lab:       Send INR reminders to:   DELIO ADMON    Comments:               See the Encounter Report to view Anticoagulation Flowsheet and Dosing Calendar (Go to Encounters tab in chart review, and find the Anticoagulation Therapy Visit)        Mel Rasmussen RN

## 2019-09-16 ENCOUNTER — ANTICOAGULATION THERAPY VISIT (OUTPATIENT)
Dept: NURSING | Facility: CLINIC | Age: 84
End: 2019-09-16
Payer: COMMERCIAL

## 2019-09-16 DIAGNOSIS — Z79.01 LONG TERM CURRENT USE OF ANTICOAGULANT THERAPY: ICD-10-CM

## 2019-09-16 LAB — INR POINT OF CARE: 2.6 (ref 0.86–1.14)

## 2019-09-16 PROCEDURE — 36416 COLLJ CAPILLARY BLOOD SPEC: CPT

## 2019-09-16 PROCEDURE — 99207 ZZC NO CHARGE NURSE ONLY: CPT

## 2019-09-16 PROCEDURE — 85610 PROTHROMBIN TIME: CPT | Mod: QW

## 2019-09-16 RX ORDER — WARFARIN SODIUM 2.5 MG/1
2.5 TABLET ORAL DAILY
Qty: 102 TABLET | Refills: 1 | Status: SHIPPED | OUTPATIENT
Start: 2019-09-16 | End: 2020-06-15

## 2019-09-16 NOTE — PROGRESS NOTES
ANTICOAGULATION FOLLOW-UP CLINIC VISIT    Patient Name:  Israel Moyer  Date:  2019  Contact Type:  Face to Face    SUBJECTIVE:  Patient Findings     Comments:   Bleeding Signs/Symptoms: NO  Thromboembolic Signs/Symptoms: NO     Medication Changes:  NO  Dietary Changes:  NO  Bacterial/Viral Infection: NO     Missed Coumadin Doses: NO  Other Concerns:  NO          Clinical Outcomes     Negatives:   Major bleeding event, Thromboembolic event, Anticoagulation-related hospital admission, Anticoagulation-related ED visit, Anticoagulation-related fatality    Comments:   Bleeding Signs/Symptoms: NO  Thromboembolic Signs/Symptoms: NO     Medication Changes:  NO  Dietary Changes:  NO  Bacterial/Viral Infection: NO     Missed Coumadin Doses: NO  Other Concerns:  NO             OBJECTIVE    INR Protime   Date Value Ref Range Status   2019 2.6 (A) 0.86 - 1.14 Final       ASSESSMENT / PLAN  INR assessment THER    Recheck INR In: 2 WEEKS    INR Location Clinic      Anticoagulation Summary  As of 2019    INR goal:   2.0-3.0   TTR:   --   INR used for dosin.6 (2019)   Warfarin maintenance plan:   5 mg (5 mg x 1) every Wed; 2.5 mg (2.5 mg x 1) all other days   Full warfarin instructions:   5 mg every Wed; 2.5 mg all other days   Weekly warfarin total:   20 mg   No change documented:   Mel Rasmussen RN   Plan last modified:   Mel Rasmussen RN (2019)   Next INR check:   2019   Target end date:            Anticoagulation Episode Summary     INR check location:       Preferred lab:       Send INR reminders to:   DELIO DAMON    Comments:               See the Encounter Report to view Anticoagulation Flowsheet and Dosing Calendar (Go to Encounters tab in chart review, and find the Anticoagulation Therapy Visit)        Mel Rasmussen RN

## 2019-09-30 ENCOUNTER — ANTICOAGULATION THERAPY VISIT (OUTPATIENT)
Dept: NURSING | Facility: CLINIC | Age: 84
End: 2019-09-30
Payer: COMMERCIAL

## 2019-09-30 LAB — INR POINT OF CARE: 2.2 (ref 0.86–1.14)

## 2019-09-30 PROCEDURE — 36416 COLLJ CAPILLARY BLOOD SPEC: CPT

## 2019-09-30 PROCEDURE — 85610 PROTHROMBIN TIME: CPT | Mod: QW

## 2019-09-30 PROCEDURE — 99207 ZZC NO CHARGE NURSE ONLY: CPT

## 2019-09-30 NOTE — PROGRESS NOTES
ANTICOAGULATION FOLLOW-UP CLINIC VISIT    Patient Name:  Israel Moyer  Date:  2019  Contact Type:  Face to Face    SUBJECTIVE:  Patient Findings     Comments:   Bleeding Signs/Symptoms: NO  Thromboembolic Signs/Symptoms: NO     Medication Changes:  NO  Dietary Changes:  NO  Bacterial/Viral Infection: NO     Missed Coumadin Doses: NO  Other Concerns:  NO          Clinical Outcomes     Negatives:   Major bleeding event, Thromboembolic event, Anticoagulation-related hospital admission, Anticoagulation-related ED visit, Anticoagulation-related fatality    Comments:   Bleeding Signs/Symptoms: NO  Thromboembolic Signs/Symptoms: NO     Medication Changes:  NO  Dietary Changes:  NO  Bacterial/Viral Infection: NO     Missed Coumadin Doses: NO  Other Concerns:  NO             OBJECTIVE    INR Protime   Date Value Ref Range Status   2019 2.2 (A) 0.86 - 1.14 Final       ASSESSMENT / PLAN  INR assessment THER    Recheck INR In: 4 WEEKS    INR Location Clinic      Anticoagulation Summary  As of 2019    INR goal:   2.0-3.0   TTR:   100.0 % (2 wk)   INR used for dosin.2 (2019)   Warfarin maintenance plan:   5 mg (5 mg x 1) every Wed; 2.5 mg (2.5 mg x 1) all other days   Full warfarin instructions:   5 mg every Wed; 2.5 mg all other days   Weekly warfarin total:   20 mg   No change documented:   Mel Rasmussen RN   Plan last modified:   Mel Rasmussen RN (2019)   Next INR check:   10/28/2019   Target end date:            Anticoagulation Episode Summary     INR check location:       Preferred lab:       Send INR reminders to:   DELIO DAOMN    Comments:               See the Encounter Report to view Anticoagulation Flowsheet and Dosing Calendar (Go to Encounters tab in chart review, and find the Anticoagulation Therapy Visit)        Mel Rasmussen RN

## 2019-10-10 ENCOUNTER — TELEPHONE (OUTPATIENT)
Dept: FAMILY MEDICINE | Facility: CLINIC | Age: 84
End: 2019-10-10

## 2019-10-10 NOTE — TELEPHONE ENCOUNTER
I do see consent to communicate with Ngozi Mcmillan on file.  I called and spoke to Ngozi, advised her Dr. Gao could do cortisone shot if indicated, he is already scheduled for tomorrow AM.    I do see patient is on coumadin; is this going to prevent an injection tomorrow?   Does he need to hold coumadin?    Routed back to Dr. Gao to address.   Of note, Dr. Shukla is out of clinic for a month per Ngozi.    Jocelyn David RN  [facility]

## 2019-10-10 NOTE — TELEPHONE ENCOUNTER
INR was 2.2 on 9/30/19 so assume will be able to proceed.    Jocelyn David RN  [facility]

## 2019-10-10 NOTE — TELEPHONE ENCOUNTER
I see note from ED visit yesterday for right shoulder pain:    IMPRESSION: Advanced degenerative osteoarthritis right glenohumeral joint.  Subacromial joint space loss suggesting rotator cuff pathology. Degenerative  changes at the AC joint. No evidence for fracture or dislocation.      Appears family is shopping for someone to do a shoulder injection.    Routed to Dr. Gao as he was requested (this is a NE patient of Dr. Shukla).    Jocelyn David RN  [facility]

## 2019-10-10 NOTE — TELEPHONE ENCOUNTER
Reason for Call:  Other call back    Detailed comments: Patient daughter Ngozi is calling because she would like to know if  does cortisone shot. Please follow up with Ngozi.    Phone Number Patient can be reached at: Cell number on file:    Telephone Information:   [phone]        Best Time: anytime     Can we leave a detailed message on this number? YES    Call taken on 10/10/2019 at 11:18 AM by Rosanna Strickland

## 2019-10-11 ENCOUNTER — OFFICE VISIT (OUTPATIENT)
Dept: FAMILY MEDICINE | Facility: CLINIC | Age: 84
End: 2019-10-11
Payer: COMMERCIAL

## 2019-10-11 VITALS
SYSTOLIC BLOOD PRESSURE: 129 MMHG | OXYGEN SATURATION: 95 % | BODY MASS INDEX: 33.72 KG/M2 | WEIGHT: 235 LBS | HEART RATE: 89 BPM | TEMPERATURE: 97.7 F | DIASTOLIC BLOOD PRESSURE: 78 MMHG

## 2019-10-11 DIAGNOSIS — M19.011 PRIMARY OSTEOARTHRITIS OF RIGHT SHOULDER: Primary | ICD-10-CM

## 2019-10-11 DIAGNOSIS — M75.121 COMPLETE TEAR OF RIGHT ROTATOR CUFF, UNSPECIFIED WHETHER TRAUMATIC: ICD-10-CM

## 2019-10-11 PROCEDURE — 20610 DRAIN/INJ JOINT/BURSA W/O US: CPT | Mod: RT | Performed by: FAMILY MEDICINE

## 2019-10-11 RX ORDER — TRIAMCINOLONE ACETONIDE 40 MG/ML
40 INJECTION, SUSPENSION INTRA-ARTICULAR; INTRAMUSCULAR ONCE
Status: COMPLETED | OUTPATIENT
Start: 2019-10-11 | End: 2019-10-11

## 2019-10-11 RX ADMIN — TRIAMCINOLONE ACETONIDE 40 MG: 40 INJECTION, SUSPENSION INTRA-ARTICULAR; INTRAMUSCULAR at 10:17

## 2019-10-11 ASSESSMENT — PAIN SCALES - GENERAL: PAINLEVEL: EXTREME PAIN (9)

## 2019-10-11 NOTE — PROGRESS NOTES
Subjective     Israel Moyer is a 83 year old male who presents to clinic today for the following health issues:    HPI   History of degenerative arthritis of the shoulder, x-ray was done yesterday showed severe degenerative arthritis.  In addition, had an MRI back in 2015 showed a full complete tear of the rotator cuff.  Patient reports his shoulder is been bothering for the past week.  He has no recent injury or trauma.  Is very stiff, very painful.  He has been taking Tylenol with no benefit.  Patient chronically on Coumadin, his last INR was 2.2.  Joint Pain    Onset: Wednesday    Description:   Location: right shoulder  Character: Sharp and Stabbing    Intensity: extreme, 9/10    Progression of Symptoms: worse    Accompanying Signs & Symptoms:  Other symptoms: numbness and tingling    History:   Previous similar pain: no       Precipitating factors:   Trauma or overuse: no     Alleviating factors:  Improved by: nothing    Therapies Tried and outcome: None    Patient was in the ED on Wednesday; MRI and X-ray were done and patient was told he his arthritis.       Current Outpatient Medications   Medication Sig Dispense Refill     allopurinol (ZYLOPRIM) 300 MG tablet Take 1 tablet (300 mg) by mouth daily 90 tablet 3     furosemide (LASIX) 40 MG tablet Take 1 tablet (40 mg) by mouth daily 90 tablet 3     levothyroxine (SYNTHROID/LEVOTHROID) 50 MCG tablet Take 1 tablet (50 mcg) by mouth daily 90 tablet 3     lisinopril (PRINIVIL/ZESTRIL) 10 MG tablet Take 1 tablet (10 mg) by mouth daily 90 tablet 3     metoprolol succinate ER (TOPROL-XL) 100 MG 24 hr tablet Take 1 tablet (100 mg) by mouth daily 90 tablet 3     rosuvastatin (CRESTOR) 40 MG tablet Take 1 tablet (40 mg) by mouth daily 90 tablet 3     warfarin ANTICOAGULANT (COUMADIN) 2.5 MG tablet Take 1 tablet (2.5 mg) by mouth daily Take 2.5 mg every day except wed take 5 mg 102 tablet 1     Allergies   Allergen Reactions     Oxycodone      nausea     Simvastatin  Cramps     Recent Labs   Lab Test 08/28/19  0910 05/01/19  1151 05/03/18  1156 04/06/17  1222   LDL 64 66 90 70   HDL 44 40 38* 36*   TRIG 79 130 169* 179*   ALT  --  17 19 21   CR 1.59* 1.42* 1.21 1.12   GFRESTIMATED 40* 46* 58* 63   GFRESTBLACK 46* 53* 70 76   POTASSIUM 4.6 4.0 4.1 3.8   TSH  --  4.98* 4.32* 3.01        Reviewed and updated as needed this visit by Provider         Review of Systems   ROS COMP: Constitutional, HEENT, cardiovascular, pulmonary, gi and gu systems are negative, except as otherwise noted.      Objective    There were no vitals taken for this visit.  There is no height or weight on file to calculate BMI.  Physical Exam   GENERAL: healthy, alert and no distress  MS: Right shoulder exam tender, limited range of motion.  PSYCH: mentation appears normal, affect normal/bright    Diagnostic Test Results:  Labs reviewed in Epic  none         Assessment & Plan     1. Primary osteoarthritis of right shoulder    - DRAIN/INJECT LARGE JOINT/BURSA  - triamcinolone (KENALOG-40) injection 40 mg  2. Complete tear of right rotator cuff, unspecified whether traumatic  - DRAIN/INJECT LARGE JOINT/BURSA  - triamcinolone (KENALOG-40) injection 40 mg  After I obtained written consent from the patient , and his daughter,I did use sterile technique, cleaned right shoulder area at the posterior aspect with Betadine 3 times, alcohol.  Used 40 mg of Kenalog with 3 cc of 2% lidocaine.  Patient tolerated the injection well.  There was no bleeding no complication.    He was given post injection instructions.      See Patient Instructions    Return for Physical Exam.    Angela Gao MD  [facility]

## 2019-10-12 ENCOUNTER — TELEPHONE (OUTPATIENT)
Dept: FAMILY MEDICINE | Facility: CLINIC | Age: 84
End: 2019-10-12

## 2019-10-12 ENCOUNTER — NURSE TRIAGE (OUTPATIENT)
Dept: NURSING | Facility: CLINIC | Age: 84
End: 2019-10-12

## 2019-10-12 DIAGNOSIS — M62.81 GENERALIZED MUSCLE WEAKNESS: Primary | ICD-10-CM

## 2019-10-12 NOTE — TELEPHONE ENCOUNTER
Reason for Disposition    [1] Caller requesting NON-URGENT health information AND [2] PCP's office is the best resource    Additional Information    Negative: [1] Caller is not with the adult (patient) AND [2] reporting urgent symptoms    Negative: Lab result questions    Negative: Medication questions    Negative: Caller can't be reached by phone    Negative: Caller has already spoken to PCP or another triager    Negative: RN needs further essential information from caller in order to complete triage    Negative: Requesting regular office appointment    Protocols used: INFORMATION ONLY CALL-A-AH

## 2019-10-12 NOTE — TELEPHONE ENCOUNTER
Ngozi and Veronica calling to ask for home health services.  Patient has upper extremity weakness and lower extremity neuropathy so he is unsteady on his feet. Ngozi reports patient fell yesterday and had difficulty getting up.  Patient continues to fall at her home and has a difficulty pushing up to standing position.   Ngozi says patient was seen recently by Dr. Gao on 10/11/19 so would like the referral to be done without having to bring patient back to the clinic for an office vist.  TE routed to clinic.  FNA also advised Ary to call the clinic.  Caller verbalizes understanding.      Message from Feng Kevin sent at 10/12/2019  3:03 PM CDT     Summary: Medical Questions     Reason for call:  Other   Patient called regarding (reason for call):Medical questions   Additional comments: Please call patient's daughter regarding medical questions. Patient currently with daughter     Phone number to reach patient:  Other phone number:  [phone]    Best Time:  any    Can we leave a detailed message on this number?  Not Applicable            Call History      Type Contact Phone   10/12/2019 03:00 PM Phone (Incoming) Israel Moyer [phone]   User: Feng Kevin   medical question

## 2019-10-12 NOTE — TELEPHONE ENCOUNTER
Clinic Action Needed: yes, call back    [facility]NA Triage Call  Presenting Problem:  Ngozi and Veronica calling to ask for home health services.  Patient has upper extremity weakness and lower extremity neuropathy so he is unsteady on his feet. Ngozi reports patient fell yesterday and had difficulty getting up.  Patient continues to fall at her home and has a difficulty pushing up to standing position.       Ngozi says patient was seen recently by Dr. Gao on 10/11/19 so would like the referral to be done without having to bring patient back to the clinic for an office vist.    FNA advised will route message to clinic for HH referral.  They would like to use [facility].    Please call Ngozi back.      Routed to: SIXTO Figueroa RN/[facility]

## 2019-10-14 ENCOUNTER — TELEPHONE (OUTPATIENT)
Dept: FAMILY MEDICINE | Facility: CLINIC | Age: 84
End: 2019-10-14

## 2019-10-14 NOTE — TELEPHONE ENCOUNTER
Routing back to Team 2 RN.       Daughter, Ngozi, is calling to check on the status of [facility] referral.  I see Dr. Gao placed a referral; however, the family hasn't been contacted, yet.    Do we need to fax the order to [facility]?    I gave the daughter the phone number for [facility].  (I am not sure how this works.)    She is leaving [facility] and is having anxiety about leaving her father home alone.     Bobo Alexandre RN

## 2019-10-14 NOTE — TELEPHONE ENCOUNTER
Patient's daughter, Ngozi, called back to discuss TCU options, respite care, and assisted living.  Instructed daughter to call  from [facility] for all options.  [facility] had [facility] on 10/9.      Bobo Alexandre RN

## 2019-10-14 NOTE — TELEPHONE ENCOUNTER
[facility] health reports that Dr. Gao cannot order [facility] because he is a [facility] provider.  Reordered home care through [facility].    Daughter aware and will call [facility] now.    Bobo Alexandre RN

## 2019-10-14 NOTE — TELEPHONE ENCOUNTER
Reason for Call:  Other call back    Detailed comments: Daughter Ngozi called and stated that she needs orders for respis ASAP.  She is going out of town today and needs to have her dad go some where as he can not be by himself and there is a place in [place] that has a room available and just needs information faxed to them.    Phone Number Patient can be reached at: Home number on file [phone] (home), Please call efrain Melvin at number [phone]    Best Time: ASAP, Daughter is going out of town and needs to discuss this ASAP    Can we leave a detailed message on this number? YES     Thank you!    Pamela Marshall [facility] Referral Rep    Call taken on 10/14/2019 at 8:13 AM by Pamela John

## 2019-10-15 ENCOUNTER — DOCUMENTATION ONLY (OUTPATIENT)
Dept: CARE COORDINATION | Facility: CLINIC | Age: 84
End: 2019-10-15

## 2019-10-15 NOTE — PROGRESS NOTES
[facility] now requests orders and shares plan of care/discharge summaries for some patients through [facility].  Please REPLY TO THIS MESSAGE OR ROUTE BACK TO THE AUTHOR in order to give authorization for orders when needed.  This is considered a verbal order, you will still receive a faxed copy of orders for signature.  Thank you for your assistance in improving collaboration for our patients.    ORDER  SN 1 week 1, 2 week 2, 1 week 2, 3 PRN for disease and symptom managmenet, medication teaching and assess effects, caregiver educaction, safety, and pain management. Check INR on 10/28.   PT to evaluate and treat to include ambulation, balance, transfers, and strengthening.   OT to evaluate and treat to include HSE, adls, DME needs, BR safety, and cognitive eval.  SW to assist with financial concerns psychosocial needs, available resources and care planning, alternative living options such as long term care or halfway placement.   HHA 1 week 1, 2 week 2, 1 week 1 to assist with bathing and personal hygiene cares.    Mayra Addison RN Admission Clinician  [facility]  [phone]

## 2019-10-24 ENCOUNTER — TELEPHONE (OUTPATIENT)
Dept: FAMILY MEDICINE | Facility: CLINIC | Age: 84
End: 2019-10-24

## 2019-10-24 RX ORDER — LANOLIN ALCOHOL/MO/W.PET/CERES
1000 CREAM (GRAM) TOPICAL DAILY
Qty: 30 TABLET | Refills: 0 | COMMUNITY
Start: 2019-10-24

## 2019-10-24 NOTE — TELEPHONE ENCOUNTER
Forms received from: [facility]    Fax listed: [phone]  Date received: October 24, 2019  Form description: HHC & POC 10/15/19-12/13/19  Once forms are completed, please return to [facility] via fax.  Form placed: RN Med Rec folder.    Thank you,  Ann Marie FRIEDMAN  [facility]  Team Ciera Coordinator

## 2019-10-24 NOTE — TELEPHONE ENCOUNTER
"Med rec completed.  Verified with patient he is taking levothyroxine 50 mcg, one tablet, daily.  Added to MAR.  Also, added historical meds:  Vitamin B12 1000 mcg, one tablet, daily, and Vltaren 1% gel topical gel, apply 2 G by topical route 4 x daily PRN to our MAR.    Per Ann Marie, placed in Dr. Hoyt'  \"sign me\" folder.    Bobo Alexandre RN    "

## 2019-10-28 ENCOUNTER — TELEPHONE (OUTPATIENT)
Dept: FAMILY MEDICINE | Facility: CLINIC | Age: 84
End: 2019-10-28

## 2019-10-28 LAB — INR PPP: 2.7 (ref 0.8–1.1)

## 2019-10-28 NOTE — TELEPHONE ENCOUNTER
ANTICOAGULATION MANAGEMENT     Patient Name:  Israel Moyer  Date:  10/28/2019    ASSESSMENT /SUBJECTIVE:      Today's INR result of 2.7 is therapeutic. Goal INR of 2.0-3.0      Warfarin dose taken: Warfarin taken as previously instructed    Diet: decreased appetite, less greens may be affecting diet and INR    Medication changes/ interactions: No new medications/supplements affecting INR    Previous INR: Therapeutic     S/S of bleeding or thromboembolism: No, bruising from cortisone inj 3 weeks ago clearing    New injury or illness:  No    Upcoming surgery, procedure or cardioversion:  No    Additional findings: N/A      PLAN:    Spoke with SIXTO Haji regarding INR result and instructed:     Warfarin Dosing Instructions: Continue your current warfarin dose    Instructed patient to follow up no later than: 4 weeks    Education provided: Velia Haji RN verbalizes understanding and agrees to warfarin dosing plan.    Instructed to call the Anticoagulation Clinic for any changes, questions or concerns. (#[phone])      Roya Munoz, PharmD [facility]  Anticoagulation Clinical Pharmacist      OBJECTIVE:  INR   Date Value Ref Range Status   10/28/2019 2.7 (A) 0.8 - 1.1 Final             Anticoagulation Summary  As of 10/28/2019    INR goal:   2.0-3.0   TTR:   100.0 % (1.4 mo)   INR used for dosin.7 (10/28/2019)   Warfarin maintenance plan:   5 mg (5 mg x 1) every Wed; 2.5 mg (2.5 mg x 1) all other days   Full warfarin instructions:   5 mg every Wed; 2.5 mg all other days   Weekly warfarin total:   20 mg   Plan last modified:   Mel Rasmussen RN (2019)   Next INR check:      Target end date:            Anticoagulation Episode Summary     INR check location:       Preferred lab:       Send INR reminders to:   DELIO DAMON    Comments:

## 2019-10-29 DIAGNOSIS — Z53.9 DIAGNOSIS NOT YET DEFINED: Primary | ICD-10-CM

## 2019-10-29 PROCEDURE — G0180 MD CERTIFICATION HHA PATIENT: HCPCS | Performed by: FAMILY MEDICINE

## 2019-10-31 ENCOUNTER — TELEPHONE (OUTPATIENT)
Dept: FAMILY MEDICINE | Facility: CLINIC | Age: 84
End: 2019-10-31

## 2019-10-31 NOTE — TELEPHONE ENCOUNTER
Forms received from [facility]/ OT 2 week 2 (10/28/19) for Zechariah Shukla MD.  Forms placed in provider 'sign me' folder.  Please fax forms to [phone] after completion.    Rut Cole  Patient Representative

## 2019-11-04 ENCOUNTER — HEALTH MAINTENANCE LETTER (OUTPATIENT)
Age: 84
End: 2019-11-04

## 2019-11-12 ENCOUNTER — TELEPHONE (OUTPATIENT)
Dept: FAMILY MEDICINE | Facility: CLINIC | Age: 84
End: 2019-11-12

## 2019-11-12 NOTE — TELEPHONE ENCOUNTER
Forms received from: [facility]    Fax listed: [phone]  Date received: November 12, 2019  Form description: Orders PT 1 week 1, 2 week 3  Once forms are completed, please return to [facility] via fax.  Form placed: Ry Shukla MD sign me folder.    Thank you,  Ann Marie FRIEDMAN  [facility] Ciera Coordinator

## 2019-11-14 ENCOUNTER — TELEPHONE (OUTPATIENT)
Dept: FAMILY MEDICINE | Facility: CLINIC | Age: 84
End: 2019-11-14

## 2019-11-14 NOTE — TELEPHONE ENCOUNTER
Reason for Call: Request for an order or referral:    Order or referral being requested: Home care orders    Date needed: as soon as possible    Has the patient been seen by the PCP for this problem? YES    Additional comments: Suyapa with [facility] is calling wanting to get orders for Israel. She is requesting an extension for RN 1x2 weeks, and home health aid 1x2 weeks.     Phone number Patient can be reached at:  Other phone number:  [phone]    Best Time:  anytime    Can we leave a detailed message on this number?  YES    Call taken on 11/14/2019 at 9:50 AM by Sylvie Sinclair

## 2019-11-14 NOTE — TELEPHONE ENCOUNTER
Nurse attempted to call number provided and received the message that this number is no longer in service.     Called [facility] and they gave me the correct number of [phone]. The voicemail box is full, unable to leave a message.     Mile Bird RN

## 2019-11-20 NOTE — TELEPHONE ENCOUNTER
Was able to contact SIXTO [facility]. Approved requested Home Care orders per RN protocol for continuation of care.  Jamaica Chahal RN

## 2019-11-25 ENCOUNTER — TELEPHONE (OUTPATIENT)
Dept: FAMILY MEDICINE | Facility: CLINIC | Age: 84
End: 2019-11-25

## 2019-11-25 LAB — INR PPP: 2.6 (ref 0.8–1.1)

## 2019-11-25 NOTE — TELEPHONE ENCOUNTER
ANTICOAGULATION MANAGEMENT     Patient Name:  Israel Moyer  Date:  2019    ASSESSMENT /SUBJECTIVE:      Today's INR result of 2.6 is therapeutic. Goal INR of 2.0-3.0      Warfarin dose taken: Warfarin taken as previously instructed    Diet: No new diet changes affecting INR    Medication changes/ interactions: No new medications/supplements affecting INR    Previous INR: Therapeutic     S/S of bleeding or thromboembolism: No    New injury or illness:  No    Upcoming surgery, procedure or cardioversion:  No    Additional findings: N/A      PLAN:    Spoke with Israel Judd RN regarding INR result and instructed:     Warfarin Dosing Instructions: Continue your current warfarin dose    Instructed patient to follow up no later than: 4 weeks    Education provided: Velia Judd RN verbalizes understanding and agrees to warfarin dosing plan.    Instructed to call the Anticoagulation Clinic for any changes, questions or concerns. (#[phone])      Mariajose LyonsD [facility]  Anticoagulation Clinical Pharmacist    OBJECTIVE:  INR   Date Value Ref Range Status   2019 2.6 (A) 0.8 - 1.1 Final             Anticoagulation Summary  As of 2019    INR goal:   2.0-3.0   TTR:   100.0 % (2.3 mo)   INR used for dosin.6 (2019)   Warfarin maintenance plan:   5 mg (5 mg x 1) every Wed; 2.5 mg (2.5 mg x 1) all other days   Full warfarin instructions:   5 mg every Wed; 2.5 mg all other days   Weekly warfarin total:   20 mg   Plan last modified:   Mel Rasmussen RN (2019)   Next INR check:      Target end date:            Anticoagulation Episode Summary     INR check location:       Preferred lab:       Send INR reminders to:   DELIO DAMON    Comments:

## 2019-11-27 ENCOUNTER — TELEPHONE (OUTPATIENT)
Dept: FAMILY MEDICINE | Facility: CLINIC | Age: 84
End: 2019-11-27

## 2019-11-27 NOTE — TELEPHONE ENCOUNTER
Forms received from: [facility]    Fax listed: [phone]  Date received: November 27, 2019  Form description: Orders SN 1 Week 3, 3 as needed Once forms are completed, please return to [facility] via fax.  Form placed: Ry Shukla MD sign me folder.     Thank you,  Ann Marie FRIEDMAN  [facility] Ciera Coordinator

## 2019-12-02 ENCOUNTER — TELEPHONE (OUTPATIENT)
Dept: FAMILY MEDICINE | Facility: CLINIC | Age: 84
End: 2019-12-02

## 2019-12-02 NOTE — TELEPHONE ENCOUNTER
Forms received from: [facility]    Fax listed: [phone]  Date received: December 2, 2019  Form description: Orders SN INR on 11/25 was 2.6, recheck on 12/23 Once forms are completed, please return to [facility] via fax.  Form placed: Ry Shukla MD sign me folder.     Thank you,  Ann Marie FRIEDMAN  [facility]  Team Ciera Coordinator

## 2019-12-04 ENCOUNTER — MEDICAL CORRESPONDENCE (OUTPATIENT)
Dept: HEALTH INFORMATION MANAGEMENT | Facility: CLINIC | Age: 84
End: 2019-12-04

## 2019-12-06 ENCOUNTER — TELEPHONE (OUTPATIENT)
Dept: FAMILY MEDICINE | Facility: CLINIC | Age: 84
End: 2019-12-06

## 2019-12-06 DIAGNOSIS — I50.9 CONGESTIVE HEART FAILURE, UNSPECIFIED HF CHRONICITY, UNSPECIFIED HEART FAILURE TYPE (H): ICD-10-CM

## 2019-12-06 DIAGNOSIS — I73.9 PVD (PERIPHERAL VASCULAR DISEASE) (H): ICD-10-CM

## 2019-12-06 DIAGNOSIS — I10 HYPERTENSION GOAL BP (BLOOD PRESSURE) < 140/90: ICD-10-CM

## 2019-12-06 DIAGNOSIS — M75.121 COMPLETE TEAR OF RIGHT ROTATOR CUFF, UNSPECIFIED WHETHER TRAUMATIC: Primary | ICD-10-CM

## 2019-12-06 DIAGNOSIS — E66.01 MORBID OBESITY (H): ICD-10-CM

## 2019-12-06 NOTE — TELEPHONE ENCOUNTER
Reason for Call: Request for an order or referral:    Order or referral being requested: Orders for In-Home care services.    Date needed: as soon as possible    Has the patient been seen by the PCP for this problem? YES    Additional comments: Sury diaz [facility] would like a call back to discuss.    Phone number Patient can be reached at:  Other phone number:  [phone]*    Best Time:  Anytime    Can we leave a detailed message on this number?  NO    Call taken on 12/6/2019 at 11:57 AM by Matilde Hilario

## 2019-12-06 NOTE — TELEPHONE ENCOUNTER
Called spoke to patient iNR apt changed due to schedule closed in the am patient agrees with change.  Mel Rasmussen,Clinic Rn  [facility]

## 2019-12-09 ENCOUNTER — TELEPHONE (OUTPATIENT)
Dept: FAMILY MEDICINE | Facility: CLINIC | Age: 84
End: 2019-12-09

## 2019-12-09 NOTE — TELEPHONE ENCOUNTER
Patient's  Daughter asking about paper work being done for In-home care services.  Will reach back out to her once more information is known.  Additional encouter was made regarding [facility] calling to discuss patient as well. [phone].         Lucila Castro RN

## 2019-12-09 NOTE — TELEPHONE ENCOUNTER
Reason for Call:  Other call back    Detailed comments: Kulwinder Case called looking for status.  PT was last seen by Dr Gao on 10/11/19 and Dr Shukla 5/1/19.  Dr Gao had ordered the last Home Health services on 10/14/19    Phone Number Patient can be reached at: Other phone number:  [phone]    Best Time: Anytime    Can we leave a detailed message on this number? YES    Call taken on 12/9/2019 at 5:11 PM by Cathy Clark

## 2019-12-09 NOTE — TELEPHONE ENCOUNTER
Reason for Call:  Other call back    Detailed comments: Ngozi would like a call back to discuss In-Home care services for pt    Phone Number Patient can be reached at: Other phone number:  [phone]    Best Time: Anytime    Can we leave a detailed message on this number? NO    Call taken on 12/9/2019 at 11:42 AM by Matilde Hilario

## 2019-12-10 NOTE — TELEPHONE ENCOUNTER
"Huddled with PCP and reviewed all information below.  PCP advises care coordinator involvement. Routing as high priority due to patient fall risk in the home (per daughter).    Phone call to patient's daughter Ngozi [phone] (consent to communicate on file). We still haven't heard from Sury with [facility] and she hasn't either.   I did leave a second voicemail for Sury today.    Ngozi filled me in on as much information as she could. If I understand things correctly, they are waiting on the [facility] (Sury with [facility] has been their point of contact) to assist with elderly waiver and alternative care in the home so patient can have assistance with shower/bath/pills/cooking/metro mobility etc.  They are concerned because [facility] has discharged patient and he is now home alone without these necessary services. He is unstable on his feet and has limited mobility per daughter.    I've attempted to call back to Sury with [facility] [phone] - awaiting call back. Daughter was told PCP needs to \"sign off\" in order to move forward but we are uncertain what that means exactly (no forms or anything found in clinic).    Devon Sánchez RN    "

## 2019-12-10 NOTE — TELEPHONE ENCOUNTER
Phone call to Sury per original message below. No answer but left detailed voice message asking her to call back ASAP today in order to help facilitate home care orders. Not sure what is needed (verbal order, form, etc).  Provided number for nurse call back line.    Devon Sánchez RN

## 2019-12-10 NOTE — TELEPHONE ENCOUNTER
"There are 2 encounters currently going for this issue.  See \"pending orders\" encounter 12/6/19.    Devon Sánchez RN    "

## 2019-12-13 ENCOUNTER — TELEPHONE (OUTPATIENT)
Dept: FAMILY MEDICINE | Facility: CLINIC | Age: 84
End: 2019-12-13

## 2019-12-13 NOTE — TELEPHONE ENCOUNTER
Reason for Call: Request for an order or referral:    Order or referral being requested: Melany from [facility] requestin.  Skilled nursing for X1 per week for 9 weeks  2.  Cancel INR appt on 2019 and have drawn at home  3.  Home Health Aid x2 per week for 9 weeks  4.  PT to evaluate and treat    Date needed: as soon as possible    Has the patient been seen by the PCP for this problem? NO    Additional comments: Verbal orders acceptable    Phone number Patient can be reached at:  Other phone number:  [phone]    Best Time:  Anytime    Can we leave a detailed message on this number?  YES    Call taken on 2019 at 1:17 PM by Cathy Clark

## 2019-12-13 NOTE — TELEPHONE ENCOUNTER
Called and gave verbal per [place] [facility] standing orders for Home Care, Assisted Living or Nursing Home Evaluations and Treatments, Mammogram (Reflex diagnostic), FIT test, Colonoscopy.

## 2019-12-13 NOTE — TELEPHONE ENCOUNTER
Spoke with Melany at [facility], reports that they will be seeing patient on 12/27 or 1/3/19, requesting verbal order to check INR on 12/27/19 instead of 12/30/19. Melany was given verbal order to check INR on 12/27/19.  Direct line to [place] ([phone]) was given for result in future.    Thanks,  Maricruz Chao RN

## 2019-12-13 NOTE — TELEPHONE ENCOUNTER
Reason for Call: Request for change to INR date     Order or referral being requested: Melany from [facility] requesting to change next INR draw from 12/30 to 12/27 as they will be seeing patient on Fridays.     Additional comments: Verbal orders acceptable     Phone number Melany can be reached at:  Other phone number:  [phone]     Best Time:  Anytime     Can we leave a detailed message on this number?  YES                    Devon Sánchez RN

## 2019-12-20 ENCOUNTER — TELEPHONE (OUTPATIENT)
Dept: FAMILY MEDICINE | Facility: CLINIC | Age: 84
End: 2019-12-20

## 2019-12-20 NOTE — TELEPHONE ENCOUNTER
Forms received from: [facility]    Fax listed: [phone]  Date received: December 20, 2019  Form description: HHC & POC 12/13/19-02/10/20  Once forms are completed, please return to [facility] via fax.  Form placed: RN Med Rec folder.    Thank you,  Ann Marie FRIEDMAN  [facility] [employer]  Team Ciera Coordinator

## 2019-12-23 ENCOUNTER — OFFICE VISIT (OUTPATIENT)
Dept: FAMILY MEDICINE | Facility: CLINIC | Age: 84
End: 2019-12-23
Payer: COMMERCIAL

## 2019-12-23 VITALS
TEMPERATURE: 98.1 F | WEIGHT: 218 LBS | DIASTOLIC BLOOD PRESSURE: 62 MMHG | BODY MASS INDEX: 31.21 KG/M2 | OXYGEN SATURATION: 98 % | HEIGHT: 70 IN | SYSTOLIC BLOOD PRESSURE: 106 MMHG | RESPIRATION RATE: 16 BRPM | HEART RATE: 63 BPM

## 2019-12-23 DIAGNOSIS — I73.9 PAD (PERIPHERAL ARTERY DISEASE) (H): ICD-10-CM

## 2019-12-23 DIAGNOSIS — M1A.09X0 IDIOPATHIC CHRONIC GOUT OF MULTIPLE SITES WITHOUT TOPHUS: ICD-10-CM

## 2019-12-23 DIAGNOSIS — I73.9 PVD (PERIPHERAL VASCULAR DISEASE) (H): ICD-10-CM

## 2019-12-23 DIAGNOSIS — I48.0 PAROXYSMAL ATRIAL FIBRILLATION (H): Primary | ICD-10-CM

## 2019-12-23 DIAGNOSIS — N18.30 CKD (CHRONIC KIDNEY DISEASE) STAGE 3, GFR 30-59 ML/MIN (H): ICD-10-CM

## 2019-12-23 DIAGNOSIS — E03.4 HYPOTHYROIDISM DUE TO ACQUIRED ATROPHY OF THYROID: ICD-10-CM

## 2019-12-23 DIAGNOSIS — Z79.01 LONG TERM CURRENT USE OF ANTICOAGULANT THERAPY: ICD-10-CM

## 2019-12-23 DIAGNOSIS — E78.5 HYPERLIPIDEMIA LDL GOAL <70: ICD-10-CM

## 2019-12-23 DIAGNOSIS — Z28.21 INFLUENZA VACCINATION DECLINED: ICD-10-CM

## 2019-12-23 DIAGNOSIS — I10 HYPERTENSION GOAL BP (BLOOD PRESSURE) < 140/90: ICD-10-CM

## 2019-12-23 DIAGNOSIS — Z74.09 IMPAIRED FUNCTIONAL MOBILITY AND ENDURANCE: ICD-10-CM

## 2019-12-23 DIAGNOSIS — E78.5 HYPERLIPIDEMIA LDL GOAL <100: ICD-10-CM

## 2019-12-23 PROCEDURE — 99214 OFFICE O/P EST MOD 30 MIN: CPT | Performed by: NURSE PRACTITIONER

## 2019-12-23 PROCEDURE — 99207 C PAF COMPLETED  NO CHARGE: CPT | Performed by: NURSE PRACTITIONER

## 2019-12-23 RX ORDER — ALLOPURINOL 100 MG/1
200 TABLET ORAL DAILY
Qty: 180 TABLET | Refills: 1 | Status: SHIPPED | OUTPATIENT
Start: 2019-12-23 | End: 2020-03-26

## 2019-12-23 ASSESSMENT — MIFFLIN-ST. JEOR: SCORE: 1695.09

## 2019-12-23 NOTE — PROGRESS NOTES
Subjective     Israel Moyer is a 84 year old male who presents to clinic today for the following health issues:    HPI   New patient to this provider.    Face to face visit for home care  Has been bruising on arms.  Daughter is here with him.    Home health care after a fall in October 2019.  Knee gave out once.  No more falls.  Has a walker.  Home health company will be doing the INR checks.  Next INR check 12/27/19.    Dr. Barrios recommended TTE 1/2020 and reminded patient of this.    Living in Senior apartment, has a dog.  Uses a walker.  Does meals, daughters help, has 5 children total- 4 girls, 1 son  Children want him to go to assisted living but he does not want to go.  Just started [facility] with help with the home care.    He states he is feeling fine.  He has not driven since the fall in 10/2019.  He feels he can and should drive.  Daughter here today has concerns and does not want him to drive.  Has h/o neuropathy per patient/daughter.    Patient Active Problem List   Diagnosis     ATRIAL FIBRILLATION     Congestive heart failure (H)     Generalized osteoarthrosis, unspecified site     Anemia     Hypothyroidism     Anal fissure     VENTRAL HERNIA     Hyperlipidemia LDL goal <100     CKD (chronic kidney disease) stage 3, GFR 30-59 ml/min (H)     Advanced directives, counseling/discussion     Gout     Hypertension goal BP (blood pressure) < 140/90     Long term current use of anticoagulant therapy     Hypothyroidism due to acquired atrophy of thyroid     Obesity (BMI 35.0-39.9) with comorbidity (H)     Venous stasis dermatitis of both lower extremities     PVD (peripheral vascular disease) (H)     Complete tear of right rotator cuff, unspecified whether traumatic     Past Surgical History:   Procedure Laterality Date     C APPENDECTOMY       C TOTAL KNEE ARTHROPLASTY  2000    right     CARPAL TUNNEL RELEASE RT/LT  2006    RIGHT     HC COLONOSCOPY THRU STOMA, DIAGNOSTIC  1977    no symptoms; worked at  [employer] at the time.     JOINT REPLACEMTN, KNEE RT/LT  ~2005    Joint Replacement knee LT       Social History     Tobacco Use     Smoking status: Never Smoker     Smokeless tobacco: Never Used   Substance Use Topics     Alcohol use: Yes     Comment: not much     Family History   Problem Relation Age of Onset     Hypertension Mother      C.A.D. Father      Diabetes Father      Hypertension Father      Hypertension Sister      Cerebrovascular Disease No family hx of      Cancer - colorectal No family hx of      Prostate Cancer No family hx of          Current Outpatient Medications   Medication Sig Dispense Refill            allopurinol (ZYLOPRIM) 300 MG tablet Take 1 tablet (300 mg) by mouth daily 90 tablet 3     cyanocobalamin (VITAMIN B-12) 1000 MCG tablet Take 1 tablet (1,000 mcg) by mouth daily 30 tablet 0     diclofenac (VOLTAREN) 1 % topical gel Place 2 g onto the skin 4 times daily As needed 100 g 0     furosemide (LASIX) 40 MG tablet Take 1 tablet (40 mg) by mouth daily 90 tablet 3     levothyroxine (SYNTHROID/LEVOTHROID) 50 MCG tablet Take 1 tablet (50 mcg) by mouth daily 90 tablet 3     lisinopril (PRINIVIL/ZESTRIL) 10 MG tablet Take 1 tablet (10 mg) by mouth daily 90 tablet 3     metoprolol succinate ER (TOPROL-XL) 100 MG 24 hr tablet Take 1 tablet (100 mg) by mouth daily 90 tablet 3     rosuvastatin (CRESTOR) 40 MG tablet Take 1 tablet (40 mg) by mouth daily 90 tablet 3     warfarin ANTICOAGULANT (COUMADIN) 2.5 MG tablet Take 1 tablet (2.5 mg) by mouth daily Take 2.5 mg every day except wed take 5 mg 102 tablet 1     Allergies   Allergen Reactions     Oxycodone      nausea     Simvastatin Cramps     BP Readings from Last 3 Encounters:   12/23/19 106/62   10/11/19 129/78   09/04/19 121/75    Wt Readings from Last 3 Encounters:   12/23/19 98.9 kg (218 lb)   10/11/19 106.6 kg (235 lb)   09/04/19 104.3 kg (230 lb)                    Reviewed and updated as needed this visit by Provider  Tobacco  Allergies   "Meds  Problems  Med Hx  Surg Hx  Fam Hx         Review of Systems   ROS COMP: Constitutional, HEENT, cardiovascular, pulmonary, gi and gu systems are negative, except as otherwise noted.      Objective    /62 (BP Location: Right arm)   Pulse 63   Temp 98.1  F (36.7  C) (Oral)   Resp 16   Ht 1.778 m (5' 10\")   Wt 98.9 kg (218 lb)   SpO2 98%   BMI 31.28 kg/m    Body mass index is 31.28 kg/m .  Physical Exam   GENERAL: healthy, alert, no distress and over weight  RESP: lungs clear to auscultation - no rales, rhonchi or wheezes  CV: regular rate and rhythm, normal S1 S2, no S3 or S4, no murmur, click or rub, mild peripheral edema  PSYCH: mentation appears normal, affect normal/bright          Assessment & Plan     1. Paroxysmal atrial fibrillation (H)  2. Long term current use of anticoagulant therapy  Chronic, stable, continue current treatment.    3. Hypertension goal BP (blood pressure) < 140/90  Chronic, stable, continue current treatment.    4. Hypothyroidism due to acquired atrophy of thyroid  Chronic, stable, continue current treatment.    5. Hyperlipidemia LDL goal <100  Chronic, stable, continue current treatment.    6. CKD (chronic kidney disease) stage 3, GFR 30-59 ml/min (H)  Chronic, stable, continue current treatment.    7. PVD (peripheral vascular disease) (H)  8. PAD (peripheral artery disease) (H)  Chronic, stable, continue current treatment.    9. Idiopathic chronic gout of multiple sites without tophus  Patient denies having any gout flare for a long time.  - Decrease to allopurinol (ZYLOPRIM) 100 MG tablet; Take 2 tablets (200 mg) by mouth daily  Dispense: 180 tablet; Refill: 1    11. Influenza vaccination declined    12. Impaired functional mobility and endurance    - Advised not to drive due to safety concerns as I do not think this patient would be able to have quick reaction if needed when driving.  - REFERRAL to [facility] for driving assessment as patient states he believes " "he is fine to drive, but would advise assessment  - PAF COMPLETED     Advised to schedule echocardiogram per Dr. Barrios in 1/2020.    BMI:   Estimated body mass index is 31.28 kg/m  as calculated from the following:    Height as of this encounter: 1.778 m (5' 10\").    Weight as of this encounter: 98.9 kg (218 lb).         Return in about 3 months (around 3/23/2020) for Routine Visit.    Osiris Marshall NP  [facility]    This encounter should be used as a FACE to FACE encounter for the acquisition of home care services.    Israel is under my medical care.  I support that Israel gets home care services for the following medical conditions:    Paroxysmal atrial fibrillation (I48)  Long term anticoagulation (Z79.01  Hypertension (I10)  PAD (I73.9)  Impaired functional mobility and endurance (Z74.09)   (diagnosis and dx code)    Due to the impaired function mobility and multiple chronic diseases he experiences, it is medically necessary for the home care services.  "

## 2019-12-23 NOTE — PATIENT INSTRUCTIONS
Call to schedule   [facility]

## 2019-12-23 NOTE — Clinical Note
Please call to follow up with daughter.  We had discussed a drivers assessment referral.I looked up and the phone number is: [facility] drivers assessment [phone] schedule an appointment at any of our locations please call [phone] or email us at [email or web address].Please let patient/daughter know.Osiris

## 2019-12-24 DIAGNOSIS — Z53.9 DIAGNOSIS NOT YET DEFINED: Primary | ICD-10-CM

## 2019-12-24 PROCEDURE — G0180 MD CERTIFICATION HHA PATIENT: HCPCS | Performed by: FAMILY MEDICINE

## 2019-12-26 ENCOUNTER — TELEPHONE (OUTPATIENT)
Dept: FAMILY MEDICINE | Facility: CLINIC | Age: 84
End: 2019-12-26

## 2019-12-26 NOTE — TELEPHONE ENCOUNTER
Quarshie, Osiris L, NP  [facility]             Please call to follow up with daughter.  We had discussed a drivers assessment referral.     I looked up and the phone number is:    [facility] drivers assessment [phone]   To schedule an appointment at any of our locations please call [phone] or email us at [email or web address].     Please let patient/daughter know.   Osiris

## 2019-12-27 ENCOUNTER — TELEPHONE (OUTPATIENT)
Dept: FAMILY MEDICINE | Facility: CLINIC | Age: 84
End: 2019-12-27

## 2019-12-27 LAB — INR PPP: 2.8 (ref 0.9–1.1)

## 2019-12-27 NOTE — TELEPHONE ENCOUNTER
ANTICOAGULATION MANAGEMENT     Patient Name:  Israel Moyer  Date:  2019    ASSESSMENT /SUBJECTIVE:      Today's INR result of 2.8 is therapeutic. Goal INR of 2.0-3.0      Warfarin dose taken: Warfarin taken as previously instructed    Diet: No new diet changes affecting INR    Medication changes/ interactions: No new medications/supplements affecting INR    Previous INR: Therapeutic     S/S of bleeding or thromboembolism: No    New injury or illness:  No    Upcoming surgery, procedure or cardioversion:  No    Additional findings: Decreased Allopurinol; no flares       PLAN:    Spoke with [facility] Nurse,  regarding INR result and instructed:     Warfarin Dosing Instructions: Continue your current warfarin dose    Instructed patient to follow up no later than: 2 weeks    Education provided: No      Israel Nurse verbalizes understanding and agrees to warfarin dosing plan.    Instructed to call the Anticoagulation Clinic for any changes, questions or concerns. (#[phone])        OBJECTIVE:  INR   Date Value Ref Range Status   2019 2.8 (A) 0.90 - 1.10 Final             Anticoagulation Summary  As of 2019    INR goal:   2.0-3.0   TTR:   100.0 % (3.4 mo)   INR used for dosin.8 (2019)   Warfarin maintenance plan:   5 mg (5 mg x 1) every Wed; 2.5 mg (2.5 mg x 1) all other days   Full warfarin instructions:   5 mg every Wed; 2.5 mg all other days   Weekly warfarin total:   20 mg   No change documented:   Aranza Hernandes RN   Plan last modified:   Mel Rasmussen RN (2019)   Next INR check:   1/10/2020   Priority:   Maintenance   Target end date:            Anticoagulation Episode Summary     INR check location:       Preferred lab:   EXTERNAL LAB    Send INR reminders to:   DELIO ISRAEL    Comments:   Home Care

## 2019-12-27 NOTE — TELEPHONE ENCOUNTER
Spoke to Daughter, Ngozi, and relayed information to her. Ngozi will call back to get phone number for Dolores Abdalla as she is driving at the moment.     Referral faxed to Dolores Abdalla at [phone].    Thank you,  Ann Marie FRIEDMAN  [facility]  Team Ciera Coordinator

## 2019-12-30 ENCOUNTER — TELEPHONE (OUTPATIENT)
Dept: FAMILY MEDICINE | Facility: CLINIC | Age: 84
End: 2019-12-30

## 2019-12-30 NOTE — TELEPHONE ENCOUNTER
Forms received from [facility]/ Revision to Plan of Care - orders - weekly orders written for the week ending Saturday. Services PT 2 visits per week for 4 weeks(start of care date 12/13/19)  for Zechariah Shukla MD.  Forms placed in provider 'sign me' folder.  Please fax forms to [phone] after completion.    Rut Cole  Patient Representative

## 2019-12-31 ENCOUNTER — MEDICAL CORRESPONDENCE (OUTPATIENT)
Dept: HEALTH INFORMATION MANAGEMENT | Facility: CLINIC | Age: 84
End: 2019-12-31

## 2019-12-31 ENCOUNTER — TELEPHONE (OUTPATIENT)
Dept: FAMILY MEDICINE | Facility: CLINIC | Age: 84
End: 2019-12-31

## 2019-12-31 NOTE — TELEPHONE ENCOUNTER
Form signed and faxed. Copy made for chart.    Thank you,  Ann Marie FRIEDMAN    NE Team Ciera

## 2019-12-31 NOTE — TELEPHONE ENCOUNTER
Forms received from [facility]/ Revision to Plan of Care / orders - SN to 12/27/19 INR 2.8 Warfarin dose 5mg W, 2.5mg AOD (start of care date 12/13/19)  for Zechariah Shukla MD.  Forms placed in provider 'sign me' folder.  Please fax forms to [phone] after completion.    Rut Cole  Patient Representative

## 2019-12-31 NOTE — TELEPHONE ENCOUNTER
Forms received from [facility]/ Drug - Drug Interactions for Zechariah Shukla MD.  Forms placed in provider 'sign me' folder.  Please fax forms to [phone] after completion.    Rut Cole  Patient Representative

## 2020-01-08 ENCOUNTER — TELEPHONE (OUTPATIENT)
Dept: FAMILY MEDICINE | Facility: CLINIC | Age: 85
End: 2020-01-08

## 2020-01-08 NOTE — TELEPHONE ENCOUNTER
Routing below concern from patient's daughter, Ngozi, to Osiris Marshall, DNP, APRN, CNP to review and advise.    Bobo Alexandre RN
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
## 2020-01-09 NOTE — TELEPHONE ENCOUNTER
"Conversations about \"retiring from driving\" can be difficult in these cases, especially if someone does not believe that they are at a safety risk.  It is even more difficult if someone does not want to \"retire from driving\" or the process is not on their preferred timeline.    I am unsure if there is a way for family to report concerns about an unsafe  to DMV or not, but could be an option to look into to anonymously report a concern.    I met this patient once in the office and do not have a long history of knowing him through time.  It appears Dr. Shukla PCP may have a better understanding about this patient overall, as he has been following him for the past 3 years.    I suggest offering an appointment with PCP for follow-up.    Osiris Marshall, DNP, APRN, CNP   "
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
## 2020-01-10 ENCOUNTER — TELEPHONE (OUTPATIENT)
Dept: FAMILY MEDICINE | Facility: CLINIC | Age: 85
End: 2020-01-10

## 2020-01-10 LAB — INR PPP: 2.1 (ref 0.9–1.1)

## 2020-01-10 NOTE — TELEPHONE ENCOUNTER
ANTICOAGULATION MANAGEMENT     Patient Name:  Israel Moyer  Date:  1/10/2020    ASSESSMENT /SUBJECTIVE:      Today's INR result of 2.1 is therapeutic. Goal INR of 2.0-3.0      Warfarin dose taken: Warfarin taken as previously instructed    Diet: No new diet changes affecting INR    Medication changes/ interactions: No new medications/supplements affecting INR    Previous INR: Therapeutic     S/S of bleeding or thromboembolism: No    New injury or illness:  No    Upcoming surgery, procedure or cardioversion:  No    Additional findings: None      PLAN:    Spoke with [facility] Nurse regarding INR result and instructed:     Warfarin Dosing Instructions: Continue your current warfarin dose    Instructed patient to follow up no later than: 2 weeks    Education provided: Yes: Watch for increased signs bruising or bleeding and if noted to be seen right away.      Nurse Melany verbalizes understanding and agrees to warfarin dosing plan.    Instructed to call the Anticoagulation Clinic for any changes, questions or concerns. (#[phone])        OBJECTIVE:  INR   Date Value Ref Range Status   01/10/2020 2.1 (A) 0.90 - 1.10 Final             Anticoagulation Summary  As of 1/10/2020    INR goal:   2.0-3.0   TTR:   100.0 % (3.9 mo)   INR used for dosin.1 (1/10/2020)   Warfarin maintenance plan:   5 mg (5 mg x 1) every Wed; 2.5 mg (2.5 mg x 1) all other days   Full warfarin instructions:   5 mg every Wed; 2.5 mg all other days   Weekly warfarin total:   20 mg   Plan last modified:   Mel Rasmussen RN (2019)   Next INR check:   2020   Priority:   Maintenance   Target end date:            Anticoagulation Episode Summary     INR check location:       Preferred lab:   EXTERNAL LAB    Send INR reminders to:   DELIO ISRAEL    Comments:   Home Care

## 2020-01-10 NOTE — TELEPHONE ENCOUNTER
Called and reviewed with Ngozi. He is not driving at this time due to the family telling him he would lose home services if he drives. This may be the situation so we should check with his home care services to see if he has been designated home bound in order to get services.  Family does not want  Patient to know they have been calling or notifying us that he should not drive.    I recommended he set up an appointment with me to review.     Team gustavo can you check with his home service program to see if there needing him to be home bound to continue there services they provide?    Zechariah Shukla MD

## 2020-01-10 NOTE — TELEPHONE ENCOUNTER
Routing back to Dr. Shukla.  Do you want [facility] to reach out to the [facility] for further information?    Spoke with [facility].  Patient IS home bound and there is a possibility that one of his insurances ([place] and [employer]) would still cover home care if he had his 's license.     If we want to pursue it, then we need [facility] to reach out to patient's  from the [facility] to approve the home care services if he wasn't home bound (but again, there is just a possibility).     [facility]:  [phone].    Bobo Alexandre RN

## 2020-01-13 NOTE — TELEPHONE ENCOUNTER
We would continue with the present home care for him as he is home bound.  We will not pursue alternative home care. I called and notified Ngozi that he is considered home bound and cannot or should not drive.    Zechariah Shukla MD

## 2020-01-21 ENCOUNTER — TELEPHONE (OUTPATIENT)
Dept: FAMILY MEDICINE | Facility: CLINIC | Age: 85
End: 2020-01-21

## 2020-01-21 NOTE — TELEPHONE ENCOUNTER
Forms received from: [facility]    Fax listed: [phone]   Date received: January 21, 2020  Form description: revision of plan HHC & POC 12/13/19-02/10/20  Once forms are completed, please return to [facility] via fax.  Form placed: Ry Shukla MD sign [facility] folder.     Thank you,  Ann Marie FRIEDMAN  [facility]  Team Ciera Coordinator

## 2020-01-23 ENCOUNTER — TELEPHONE (OUTPATIENT)
Dept: FAMILY MEDICINE | Facility: CLINIC | Age: 85
End: 2020-01-23

## 2020-01-23 NOTE — TELEPHONE ENCOUNTER
[facility]/ Home [facility] Cert (01/10/20-03/09/20)  and also a Revision to Plan of care - orders - client discharged from home health physical therapy (discharge date 01/10/20) received.  Given to Team Ciera HENSON for med rec.  Please give to provider for review and signature upon completion.    Fax to: [phone]      Rut Cole  Patient Representative

## 2020-01-24 DIAGNOSIS — I73.9 PAD (PERIPHERAL ARTERY DISEASE) (H): ICD-10-CM

## 2020-01-24 DIAGNOSIS — E78.5 HYPERLIPIDEMIA LDL GOAL <70: ICD-10-CM

## 2020-01-27 ENCOUNTER — MEDICAL CORRESPONDENCE (OUTPATIENT)
Dept: HEALTH INFORMATION MANAGEMENT | Facility: CLINIC | Age: 85
End: 2020-01-27

## 2020-01-27 DIAGNOSIS — Z53.9 DIAGNOSIS NOT YET DEFINED: Primary | ICD-10-CM

## 2020-01-27 PROCEDURE — G0180 MD CERTIFICATION HHA PATIENT: HCPCS | Performed by: FAMILY MEDICINE

## 2020-01-27 RX ORDER — ROSUVASTATIN CALCIUM 40 MG/1
TABLET, COATED ORAL
Qty: 90 TABLET | Refills: 2 | Status: SHIPPED | OUTPATIENT
Start: 2020-01-27 | End: 2020-10-28

## 2020-01-27 NOTE — TELEPHONE ENCOUNTER
"rosuvastatin (CRESTOR) 40 MG tablet ()  Last Written Prescription Date:  19  Last Fill Quantity: 90,  # refills: 3   Last office visit: 2019      Future Office Visit:   Next 5 appointments (look out 90 days)    Mar 23, 2020 10:00 AM CDT  SHORT [facility] [address] [place] [id number] [phone]         Requested Prescriptions   Pending Prescriptions Disp Refills     rosuvastatin (CRESTOR) 40 MG tablet [Pharmacy Med Name: Rosuvastatin Calcium 40 MG Oral Tablet] 90 tablet 2     Sig: TAKE 1 TABLET BY MOUTH ONCE DAILY       [facility] Protocol Passed - 2020  9:24 AM        Passed - LDL on file in past 12 months     Recent Labs   Lab Test 19  0910   LDL 64             Passed - No abnormal creatine kinase in past 12 months     No lab results found.             Passed - Recent (12 mo) or future (30 days) visit within the authorizing provider's specialty     Patient has had an office visit with the authorizing provider or a provider within the authorizing providers department within the previous 12 mos or has a future within next 30 days. See \"Patient Info\" tab in inbasket, or \"Choose Columns\" in Meds & Orders section of the refill encounter.              Passed - Medication is active on med list        Passed - Patient is age 18 or older        Prescription approved per [facility] Refill Protocol.  Viry Felton RN BSN  [facility]      "

## 2020-01-31 ENCOUNTER — TELEPHONE (OUTPATIENT)
Dept: FAMILY MEDICINE | Facility: CLINIC | Age: 85
End: 2020-01-31

## 2020-01-31 LAB — INR PPP: 2.1 (ref 0.9–1.1)

## 2020-01-31 NOTE — TELEPHONE ENCOUNTER
ANTICOAGULATION MANAGEMENT     Patient Name:  Israel Moyer  Date:  2020    ASSESSMENT /SUBJECTIVE:      Today's INR result of 2.1 is therapeutic. Goal INR of 2.0-3.0      Warfarin dose taken: Warfarin taken as previously instructed    Diet: No new diet changes affecting INR    Medication changes/ interactions: No new medications/supplements affecting INR    Previous INR: Therapeutic     S/S of bleeding or thromboembolism: No    New injury or illness:  No    Upcoming surgery, procedure or cardioversion:  No    Additional findings: None      PLAN:    Spoke with Wendi ([facility]  RN) regarding INR result and instructed:     Warfarin Dosing Instructions: Continue your current warfarin dose of 5mg Wed; 2.5mg all other days    Instructed patient to follow up no later than: 4 weeks  Orders given to  Homecare nurse/facility to recheck    Education provided: No      Wendi verbalizes understanding and agrees to warfarin dosing plan.    Instructed to call the Anticoagulation Clinic for any changes, questions or concerns. (#[phone])        OBJECTIVE:  INR   Date Value Ref Range Status   2020 2.1 (A) 0.90 - 1.10 Final             Anticoagulation Summary  As of 2020    INR goal:   2.0-3.0   TTR:   100.0 % (4.6 mo)   INR used for dosin.1 (2020)   Warfarin maintenance plan:   5 mg (5 mg x 1) every Wed; 2.5 mg (2.5 mg x 1) all other days   Full warfarin instructions:   5 mg every Wed; 2.5 mg all other days   Weekly warfarin total:   20 mg   Plan last modified:   Mel Rasmussen RN (2019)   Next INR check:   2020   Priority:   Maintenance   Target end date:            Anticoagulation Episode Summary     INR check location:       Preferred lab:   EXTERNAL LAB    Send INR reminders to:   DELIO ISRAEL    Comments:   Home Care

## 2020-02-05 ENCOUNTER — TELEPHONE (OUTPATIENT)
Dept: FAMILY MEDICINE | Facility: CLINIC | Age: 85
End: 2020-02-05

## 2020-02-05 NOTE — TELEPHONE ENCOUNTER
Forms received from: [facility] [employer] [facility] [employer] [facility]    Fax listed: [phone]   Date received: February 5, 2020  Form description: revision of plan HHC & POC 01/0-03/09 Once forms are completed, please return to [facility] via fax.  Form placed: Ry Shukla MD sign [facility] folder.     Thank you,  Ann Marie FRIEDMAN  [facility]  Team Ciera Coordinator

## 2020-02-27 ENCOUNTER — TELEPHONE (OUTPATIENT)
Dept: FAMILY MEDICINE | Facility: CLINIC | Age: 85
End: 2020-02-27

## 2020-02-27 NOTE — TELEPHONE ENCOUNTER
Forms received from: [facility] [employer] [facility]    Fax listed: [phone]   Date received: February 27, 2020  Form description: revision of plan HHC & POC 01/0-03/09 Once forms are completed, please return to [facility] via fax.  Form placed: Ry Shukla MD sign [facility] folder.     Thank you,  Ann Marie FRIEDMAN  [facility]  Team Ciera Coordinator

## 2020-02-28 ENCOUNTER — TELEPHONE (OUTPATIENT)
Dept: FAMILY MEDICINE | Facility: CLINIC | Age: 85
End: 2020-02-28

## 2020-02-28 LAB — INR PPP: 2.6 (ref 0.9–1.1)

## 2020-02-28 NOTE — TELEPHONE ENCOUNTER
ANTICOAGULATION MANAGEMENT     Patient Name:  Israel Moyer  Date:  2020    ASSESSMENT /SUBJECTIVE:    Today's INR result of 2.6 is therapeutic. Goal INR of 2.0-3.0      Warfarin dose taken: Warfarin taken as previously instructed    Diet: No new diet changes affecting INR    Medication changes/ interactions: No new medications/supplements affecting INR    Previous INR: Therapeutic     S/S of bleeding or thromboembolism: No    New injury or illness:  No    Upcoming surgery, procedure or cardioversion:  No    Additional findings: None    PLAN:    Spoke with Wendi ( nurse) regarding INR result and instructed:     Warfarin Dosing Instructions: Continue your current warfarin dose of 5mg Wed; 2.5mg all other days    Instructed patient to follow up no later than: 4 weeks  Orders given to  Homecare nurse/facility to recheck    Education provided: No      Wendi verbalizes understanding and agrees to warfarin dosing plan.    Instructed to call the Anticoagulation Clinic for any changes, questions or concerns. (#[phone])        OBJECTIVE:  INR   Date Value Ref Range Status   2020 2.6 (A) 0.90 - 1.10 Final             Anticoagulation Summary  As of 2020    INR goal:   2.0-3.0   TTR:   100.0 % (5.5 mo)   INR used for dosin.6 (2020)   Warfarin maintenance plan:   5 mg (5 mg x 1) every Wed; 2.5 mg (2.5 mg x 1) all other days   Full warfarin instructions:   5 mg every Wed; 2.5 mg all other days   Weekly warfarin total:   20 mg   Plan last modified:   Mel Rasmussen RN (2019)   Next INR check:   3/27/2020   Priority:   Maintenance   Target end date:            Anticoagulation Episode Summary     INR check location:       Preferred lab:   EXTERNAL LAB    Send INR reminders to:   DELIO ISRAEL    Comments:   Home Care

## 2020-03-09 ENCOUNTER — TELEPHONE (OUTPATIENT)
Dept: FAMILY MEDICINE | Facility: CLINIC | Age: 85
End: 2020-03-09

## 2020-03-09 NOTE — TELEPHONE ENCOUNTER
Reason for Call: Request for an order or referral:    Order or referral being requested: order    Date needed: as soon as possible    Has the patient been seen by the PCP for this problem? YES    Additional comments: continuing home care   nursing 1 x per week, next 9 weeks  Home health aid, 2x per week, for next 9 weeks,   Please call with any further questions or when this has been completed.        Phone number Patient can be reached at:  Other phone number:  [phone]    Best Time:  anytime    Can we leave a detailed message on this number?  YES    Call taken on 3/9/2020 at 1:43 PM by Jan Brito

## 2020-03-20 ENCOUNTER — TELEPHONE (OUTPATIENT)
Dept: FAMILY MEDICINE | Facility: CLINIC | Age: 85
End: 2020-03-20

## 2020-03-20 NOTE — TELEPHONE ENCOUNTER
Reason for Call:  Other     Detailed comments: Patient requesting for call back directly to him, not his daughter. Please advise.     Phone Number Patient can be reached at: Home number on file [phone] (home)    Best Time: Anytime    Can we leave a detailed message on this number? YES    Call taken on 3/20/2020 at 11:09 AM by Alecia Treviño

## 2020-03-20 NOTE — TELEPHONE ENCOUNTER
Called daughter of patient, Ngozi, and left a voicemail message for her to call the clinic so we can discuss fathers appointment on Monday.     Home care nurse called into clinic and originally spoke with Marivel (TC at [place]).  She put Marivel on speaker phone and asked if patient was coming in on Monday or was having a phone visit. Marivel did not know what was going on with appointment and read the appointment notes and then in the background she heard the patient and he was upset about the note stating something about (discuss drivers license).  Marivel transferred call to me and I told home care nurse that I would have to check into this appointment and call her back.      Rut Cole

## 2020-03-20 NOTE — TELEPHONE ENCOUNTER
Routing to Dr Shukla.    Please advise.  Returned call to patient. Patient wants to know if he is supposed to come into the clinic on Monday for his appointment or does Dr Shukla want to do a phone appointment?    Rut Cole

## 2020-03-23 ENCOUNTER — TELEPHONE (OUTPATIENT)
Dept: FAMILY MEDICINE | Facility: CLINIC | Age: 85
End: 2020-03-23

## 2020-03-23 DIAGNOSIS — N18.30 CKD (CHRONIC KIDNEY DISEASE) STAGE 3, GFR 30-59 ML/MIN (H): ICD-10-CM

## 2020-03-23 DIAGNOSIS — M1A.09X1 IDIOPATHIC CHRONIC GOUT OF MULTIPLE SITES WITH TOPHUS: ICD-10-CM

## 2020-03-23 DIAGNOSIS — M1A.09X0 IDIOPATHIC CHRONIC GOUT OF MULTIPLE SITES WITHOUT TOPHUS: ICD-10-CM

## 2020-03-23 DIAGNOSIS — I10 HYPERTENSION GOAL BP (BLOOD PRESSURE) < 140/90: ICD-10-CM

## 2020-03-23 DIAGNOSIS — I48.20 CHRONIC ATRIAL FIBRILLATION (H): ICD-10-CM

## 2020-03-23 DIAGNOSIS — E03.4 HYPOTHYROIDISM DUE TO ACQUIRED ATROPHY OF THYROID: ICD-10-CM

## 2020-03-23 DIAGNOSIS — I13.0 HYPERTENSIVE HEART AND KIDNEY DISEASE WITH HF AND WITH CKD STAGE I-IV (H): Primary | ICD-10-CM

## 2020-03-23 NOTE — TELEPHONE ENCOUNTER
Forms received from: [facility]   Phone number listed: [phone]   Fax listed: [phone]  Date received: 03/23/2020  Form description: HHC and Plan of Care  Once forms are completed, please return to [facility] via fax.  Is patient requesting to be contacted when forms are completed: NA    Form placed: In provider's nurse basket  Marivel Roy

## 2020-03-23 NOTE — TELEPHONE ENCOUNTER
Reason for Call:  Other     Detailed comments: Patient wants to have his prescriptions sent through [facility] - home delivery.   [facility] [address] [place] [address] [facility]     Please call patient with any questions.     Phone Number Patient can be reached at: Home number on file [phone] (home)    Best Time: anytime    Can we leave a detailed message on this number? YES    Call taken on 3/23/2020 at 8:46 AM by Shanthi Adam

## 2020-03-23 NOTE — TELEPHONE ENCOUNTER
Called pharmacy and they stated they have 2 delivery options for the patient. [employer] or a 1-800 number. Both require the patient to call them. Called and informed the patient of this. He verbalized understanding.     Mile Bird RN

## 2020-03-26 RX ORDER — LEVOTHYROXINE SODIUM 50 UG/1
50 TABLET ORAL DAILY
Qty: 90 TABLET | Refills: 3 | Status: SHIPPED | OUTPATIENT
Start: 2020-03-26 | End: 2021-01-22

## 2020-03-26 RX ORDER — ALLOPURINOL 100 MG/1
200 TABLET ORAL DAILY
Qty: 180 TABLET | Refills: 1 | Status: SHIPPED | OUTPATIENT
Start: 2020-03-26 | End: 2020-12-28

## 2020-03-26 RX ORDER — ALLOPURINOL 300 MG/1
1 TABLET ORAL DAILY
Qty: 90 TABLET | Refills: 3 | Status: CANCELLED | OUTPATIENT
Start: 2020-03-26

## 2020-03-26 RX ORDER — LISINOPRIL 10 MG/1
10 TABLET ORAL DAILY
Qty: 90 TABLET | Refills: 3 | Status: SHIPPED | OUTPATIENT
Start: 2020-03-26 | End: 2021-01-22

## 2020-03-26 RX ORDER — METOPROLOL SUCCINATE 100 MG/1
100 TABLET, EXTENDED RELEASE ORAL DAILY
Qty: 90 TABLET | Refills: 3 | Status: SHIPPED | OUTPATIENT
Start: 2020-03-26 | End: 2021-01-22

## 2020-03-26 NOTE — TELEPHONE ENCOUNTER
Medication reconciliation NOT completed at this time.     Routing to PCP to review and sign orders.     Mari Kaur RN, BSN, PHN  [facility]

## 2020-03-27 ENCOUNTER — TELEPHONE (OUTPATIENT)
Dept: FAMILY MEDICINE | Facility: CLINIC | Age: 85
End: 2020-03-27

## 2020-03-27 LAB — INR PPP: 2.6 (ref 0.9–1.1)

## 2020-03-27 NOTE — TELEPHONE ENCOUNTER
Reason for Call:  Other Update    Detailed comments: Wendi RN, with [facility], calling to advise that patient fell last night at 11:30pm. Remained on floor for 2 hours, until pressing life alert. Was taken to ER. Abrasions on both knees, right hand, left forearm, and also left great toe has hairline fracture. Homecare to assess over weekend, planning on 1-3 x per week for wound care, clean wounds with soap and water, apply band aids. Patient is also needing follow up in 3-4 days with primary care.     Phone Number Patient can be reached at: Other phone number:  [phone]    Best Time: anytime    Can we leave a detailed message on this number? YES    Call taken on 3/27/2020 at 12:39 PM by Jan Brito

## 2020-03-27 NOTE — TELEPHONE ENCOUNTER
Call and spoke with the patient. He states his knees are sore from falling on them, but he is otherwise well. He has a nurse set up for the wound care, will have someone come out again this weekend to change the dressings.       Scheduled him for phone follow up next week.    Elodia Queen RN on 3/27/2020 at 2:54 PM

## 2020-03-27 NOTE — TELEPHONE ENCOUNTER
"ANTICOAGULATION MANAGEMENT     Patient Name:  Israel Moyer  Date:  3/27/2020    ASSESSMENT /SUBJECTIVE:      Today's INR result of 2.6 is therapeutic. Goal INR of 2.0-3.0      Warfarin dose taken: Warfarin taken as previously instructed    Diet: No new diet changes affecting INR    Medication changes/ interactions: No new medications/supplements affecting INR-did take one dose of Tylenol after returning from ER last night    Previous INR: Therapeutic     S/S of bleeding or thromboembolism: No    New injury or illness:  Yes: Fell last night after \"knees gave out\" and sustained abrasions to his knees and forearm, also a broken great L toe    Upcoming surgery, procedure or cardioversion:  No    Additional findings: None      PLAN:    Spoke with Wendi ( RN) regarding INR result and instructed:     Warfarin Dosing Instructions: Continue your current warfarin dose of 5mg Wed; 2.5mg all other days    Instructed patient to follow up no later than: 4 weeks  Orders given to  Homecare nurse/facility to recheck    Education provided: Yes: Please notify [facility] if there are any other health changes or any medication changes between now and next INR    Wendi verbalizes understanding and agrees to warfarin dosing plan.    Instructed to call the Anticoagulation Clinic for any changes, questions or concerns. (#[phone])        OBJECTIVE:  INR   Date Value Ref Range Status   2020 2.6 (A) 0.90 - 1.10 Final             Anticoagulation Summary  As of 3/27/2020    INR goal:   2.0-3.0   TTR:   100.0 % (6.4 mo)   INR used for dosin.6 (3/27/2020)   Warfarin maintenance plan:   5 mg (5 mg x 1) every Wed; 2.5 mg (2.5 mg x 1) all other days   Full warfarin instructions:   5 mg every Wed; 2.5 mg all other days   Weekly warfarin total:   20 mg   Plan last modified:   Mel Rasmussen RN (2019)   Next INR check:   2020   Priority:   Maintenance   Target end date:            Anticoagulation Episode " Summary     INR check location:       Preferred lab:   EXTERNAL LAB    Send INR reminders to:   DELIO ISRAEL    Comments:   Home Care

## 2020-03-30 DIAGNOSIS — Z53.9 DIAGNOSIS NOT YET DEFINED: Primary | ICD-10-CM

## 2020-03-30 PROCEDURE — G0179 MD RECERTIFICATION HHA PT: HCPCS | Performed by: FAMILY MEDICINE

## 2020-04-01 ENCOUNTER — VIRTUAL VISIT (OUTPATIENT)
Dept: FAMILY MEDICINE | Facility: CLINIC | Age: 85
End: 2020-04-01
Payer: COMMERCIAL
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
## 2020-04-01 DIAGNOSIS — S92.415D CLOSED NONDISPLACED FRACTURE OF PROXIMAL PHALANX OF LEFT GREAT TOE WITH ROUTINE HEALING, SUBSEQUENT ENCOUNTER: ICD-10-CM

## 2020-04-01 DIAGNOSIS — T14.8XXA ABRASION: ICD-10-CM

## 2020-04-01 DIAGNOSIS — Z09 HOSPITAL DISCHARGE FOLLOW-UP: Primary | ICD-10-CM

## 2020-04-01 PROCEDURE — 99214 OFFICE O/P EST MOD 30 MIN: CPT | Mod: TEL | Performed by: FAMILY MEDICINE

## 2020-04-01 NOTE — PROGRESS NOTES
"Subjective     Israel Moyer is a 84 year old male who is being evaluated via a billable telephone visit.      The patient has been notified of following:     \"This telephone visit will be conducted via a call between you and your physician/provider. We have found that certain health care needs can be provided without the need for a physical exam.  This service lets us provide the care you need with a short phone conversation.  If a prescription is necessary we can send it directly to your pharmacy.  If lab work is needed we can place an order for that and you can then stop by our lab to have the test done at a later time.    If during the course of the call the physician/provider feels a telephone visit is not appropriate, you will not be charged for this service.\"     Patient has given verbal consent for Telephone visit?  Yes    Israel Moyer complains of   Chief Complaint   Patient presents with     RECHECK       ALLERGIES  Oxycodone and Simvastatin    ED/UC Followup:    Facility:  [facility]   Date of visit: 3/27/2020  Reason for visit: Fell and could not get up, abrasion of knee bilateral   Current Status: doing alright now, nurse came and put on new dressings yesterday     Reviewed not from ER  Impression and Plan:  Israel Moyer is a 84-year-old male with a pertinent history of bilateral TKAs and afib on warfarin who presents to the emergency department from his senior living facility after having a mechanical fall in which his walker partially folded, causing him to fall to the ground and land on his knees. He has bilateral anterior knee as well as great toe and second toe dorsal abrasions from crawling about on the floor to get to his LifeAlert. He denies any other injuries. no other sig injuries evident on exam. He does have minimal to no sensation just below his knees to his toes from neuropathy. He does have minimal swelling of his left great toe. He has a non-displaced oblique " proximal great toe phalanx fracture. Given the patient's lack of sensation, this should not limit his ability to ambulate and he is quite motivated to ambulate and use his walker and not be hospitalized. He passed trial of ambulation with the assistance of a walker. Voicemail for [facility] left. I preferred not to give him a hard soled shoe or other walking device that could potentially could cause further falls and injuries. Wound care discussed. Signs of infection discussed, discharged.     Diagnosis:  ENCOUNTER DIAGNOSES   ICD-10-CM   1. Abrasion of knee, bilateral S80.211A   S80.212A   2. Abrasion of toe, unspecified laterality, initial encounter S90.416A   3. Closed nondisplaced fracture of proximal phalanx of left great toe, initial encounter S92.415A          No concerns for patient. Reviewed meds and med list up to date.   He tripped. Did not have syncopal episode  abraisons are clean and home nurse is changing dressings.   Ambulating independently        Reviewed and updated as needed this visit by Provider         Review of Systems   ROS COMP: Constitutional, HEENT, cardiovascular, pulmonary, gi and gu systems are negative, except as otherwise noted.       Objective   Reported vitals:  There were no vitals taken for this visit.     Psych: Alert and oriented times 3; coherent speech, normal   rate and volume, able to articulate logical thoughts, able   to abstract reason, no tangential thoughts, no hallucinations   or delusions  His affect is is normal over the phone     Diagnostic Test Results:  Labs reviewed in Epic- INR is normal      /  /Assessment/Plan:  1. Hospital discharge follow-up  Doing well, home care nurse has seen and no new concerns    2. Fall, subsequent encounter  Trip, no syncople,     Call if he notes concerns for presyncope    3. Closed nondisplaced fracture of proximal phalanx of left great toe with routine healing, subsequent encounter  Stable  Continue present cares.  Firm shoe    4. Abrasion  Healing well per patient and his report from home care      Return in about 3 months (around 7/1/2020) for Routine Visit.      Phone call duration:  6 minutes 8:43-8:52 in chart review, ER Record review and consult with patient    Zechariah Shukla MD

## 2020-04-02 ENCOUNTER — TELEPHONE (OUTPATIENT)
Dept: FAMILY MEDICINE | Facility: CLINIC | Age: 85
End: 2020-04-02

## 2020-04-02 NOTE — TELEPHONE ENCOUNTER
Forms received from: [facility]   Phone number listed: [phone]   Fax listed: [phone]  Date received: 04/02/20  Form description: Revisions to Plan of Care  Once forms are completed, please return to [facility] via Fax.  Is patient requesting to be contacted when forms are completed: NA    Form placed: In providers beny John

## 2020-04-02 NOTE — TELEPHONE ENCOUNTER
Forms received from: [facility]   Phone number listed: [phone]   Fax listed: [phone]  Date received: 04/02/20  Form description: Home Health Cert and Plan of Care  Once forms are completed, please return to [facility] via Fax.  Is patient requesting to be contacted when forms are completed: NA    Form placed: In providers beny John

## 2020-04-03 DIAGNOSIS — Z53.9 DIAGNOSIS NOT YET DEFINED: Primary | ICD-10-CM

## 2020-04-06 NOTE — TELEPHONE ENCOUNTER
Forms signed and faxed.    Thank you!    Pamela ELLIS  [facility] Referral Rep

## 2020-04-06 NOTE — TELEPHONE ENCOUNTER
Form signed and faxed.    Thank you!    Pamela ELLIS  [facility] Referral Rep

## 2020-04-10 ENCOUNTER — TELEPHONE (OUTPATIENT)
Dept: FAMILY MEDICINE | Facility: CLINIC | Age: 85
End: 2020-04-10

## 2020-04-10 NOTE — TELEPHONE ENCOUNTER
Forms received from [facility]: revision to plan of care, SN 2 week5 for Suzy Story DO.  Forms placed in provider 'sign me' folder.  Please fax forms to [phone] after completion.    Shun De La Garza

## 2020-04-15 ENCOUNTER — TELEPHONE (OUTPATIENT)
Dept: FAMILY MEDICINE | Facility: CLINIC | Age: 85
End: 2020-04-15

## 2020-04-15 NOTE — TELEPHONE ENCOUNTER
Reason for Call: Request for an order or referral:    Order or referral being requested: Pt orders needed to treat and revaluate.     Patient had 2nd fall in a month and nurse Melany is looking to get back in and revaluate patient.       Date needed: as soon as possible    Has the patient been seen by the PCP for this problem? Not Applicable    Additional comments: verbal orders needed.     Phone number Patient can be reached at:  Other phone number:  [phone]    Best Time:  Any     Can we leave a detailed message on this number?  YES    Call taken on 4/15/2020 at 2:46 PM by Vania Contreras

## 2020-04-20 ENCOUNTER — TELEPHONE (OUTPATIENT)
Dept: FAMILY MEDICINE | Facility: CLINIC | Age: 85
End: 2020-04-20

## 2020-04-20 DIAGNOSIS — M25.362 KNEE INSTABILITY, LEFT: ICD-10-CM

## 2020-04-20 DIAGNOSIS — M17.12 PRIMARY OSTEOARTHRITIS OF LEFT KNEE: Primary | ICD-10-CM

## 2020-04-20 NOTE — TELEPHONE ENCOUNTER
Order needs to be signed.    Thank you,  Ann Marie FRIEDMAN  [facility]  Team [facility]iera Coordinator

## 2020-04-20 NOTE — TELEPHONE ENCOUNTER
The code she requested is for and ankle diagnosis not knee    Please clarify    I have place what I think should be the correct diagnosis codes    Ok to send pended order

## 2020-04-20 NOTE — TELEPHONE ENCOUNTER
Called Catia and she stated she must have grabbed the wrong form that had the wrong code and stated to use the code Dr Vazquez lawrence cued up.     Flagged for TC to fax.    Mile Bird RN

## 2020-04-20 NOTE — TELEPHONE ENCOUNTER
Catia PT with [facility] patient needs a DME order for a knee brace.     DMEAneeds to have follow ing wording    Left knee brace to assist with ambulation and prevent knee instability due to arthritis   diagnosis code [id number]    Please fax to Anna at [phone]    Emily can be reached at [phone] with any questions.

## 2020-04-20 NOTE — TELEPHONE ENCOUNTER
Routed to provider to sign order pended in this encounter.    Jocelyn David RN  [facility]

## 2020-04-21 ENCOUNTER — TELEPHONE (OUTPATIENT)
Dept: FAMILY MEDICINE | Facility: CLINIC | Age: 85
End: 2020-04-21

## 2020-04-21 NOTE — TELEPHONE ENCOUNTER
Called Yesika with [facility] and left voicemail asking to call back. Patient was seen outside of [place] for ED visit. records need to be requested directly to [facility].    Thank you,  Ann Marie FRIEDMAN  [facility]  Team [facility]iera Coordinator

## 2020-04-21 NOTE — TELEPHONE ENCOUNTER
Reason for Call:  Other Hosp visit notes    Detailed comments:  Yesika from [facility] wanting hosp visit notes faxed to her @ [phone] any questions give her a call.    Phone Number Patient can be reached at: Other phone number:  [phone]    Best Time:  Anytime     Can we leave a detailed message on this number? YES    Call taken on 4/21/2020 at 1:54 PM by Lindsey Worthy

## 2020-04-21 NOTE — TELEPHONE ENCOUNTER
Faxed to [facility] [phone].    Thank you,  Ann Marie FRIEDMAN  [facility]  Team [facility]iera Coordinator

## 2020-04-22 NOTE — TELEPHONE ENCOUNTER
Yesika returned call and was notified to contact [facility] to get ED notes from 03/27.    Thank you,  Ann Marie FRIEDMAN  [facility]  Team [facility]iera Coordinator

## 2020-04-24 ENCOUNTER — TELEPHONE (OUTPATIENT)
Dept: FAMILY MEDICINE | Facility: CLINIC | Age: 85
End: 2020-04-24

## 2020-04-24 LAB — INR PPP: 2.1 (ref 0.9–1.1)

## 2020-04-24 NOTE — TELEPHONE ENCOUNTER
ANTICOAGULATION MANAGEMENT     Patient Name:  Israel Moyer  Date:  2020    ASSESSMENT /SUBJECTIVE:    Today's INR result of 2.1 is therapeutic. Goal INR of 2.0-3.0      Warfarin dose taken: Warfarin taken as previously instructed    Diet: No new diet changes affecting INR    Medication changes/ interactions: No new medications/supplements affecting INR    Previous INR: Therapeutic     S/S of bleeding or thromboembolism: No    New injury or illness: No    Upcoming surgery, procedure or cardioversion: No    Additional findings: None      PLAN:    Spoke with [facility] home health nurse, regarding INR result and instructed:     Warfarin Dosing Instructions: Continue your current warfarin dose 5 mg on wed and 2.5 mg all other days    Instructed patient to follow up no later than: 5 weeks  Orders given to  Homecare nurse/facility to recheck    Education provided: None required      [facility] Mosley home health nurse, verbalizes understanding and agrees to warfarin dosing plan.    Instructed to call the Anticoagulation Clinic for any changes, questions or concerns. (#[phone])        OBJECTIVE:  INR   Date Value Ref Range Status   2020 2.1 (A) 0.90 - 1.10 Final             Anticoagulation Summary  As of 2020    INR goal:   2.0-3.0   TTR:   100.0 % (7.4 mo)   INR used for dosin.1 (2020)   Warfarin maintenance plan:   5 mg (5 mg x 1) every Wed; 2.5 mg (2.5 mg x 1) all other days   Full warfarin instructions:   5 mg every Wed; 2.5 mg all other days   Weekly warfarin total:   20 mg   Plan last modified:   Mel Rasmussen RN (2019)   Next INR check:   2020   Priority:   Maintenance   Target end date:            Anticoagulation Episode Summary     INR check location:       Preferred lab:   EXTERNAL LAB    Send INR reminders to:   DELIO ISRAEL    Comments:   Home Care

## 2020-04-27 ENCOUNTER — TELEPHONE (OUTPATIENT)
Dept: FAMILY MEDICINE | Facility: CLINIC | Age: 85
End: 2020-04-27

## 2020-04-27 NOTE — TELEPHONE ENCOUNTER
Forms received from: [facility]    Fax listed: [phone]  Date received: April 27, 2020  Form description: Revision to plan of care 03/10-05/08  Once forms are completed, please return to [facility] via fax.  Form placed: Ry Shukla MD sign me folder.    Thank you,  Ann Marie FRIEDMAN  [facility]  Team [facility]iera Coordinator

## 2020-04-27 NOTE — TELEPHONE ENCOUNTER
Forms received from: [facility]    Fax listed: [phone]  Date received: April 27, 2020  Form description: HHC & POC 04/17-06/15  Once forms are completed, please return to [facility] via fax.  Form placed: Ry Shukla MD sign me folder.    Thank you,  Ann Marie FRIEDMAN  [facility]  Team [facility]iera Coordinator

## 2020-04-29 ENCOUNTER — TELEPHONE (OUTPATIENT)
Dept: FAMILY MEDICINE | Facility: CLINIC | Age: 85
End: 2020-04-29

## 2020-04-29 NOTE — TELEPHONE ENCOUNTER
Forms received from: [facility]    Fax listed: [phone]  Date received: April 29, 2020  Form description: Revision to plan of care 03/10-05/08-goal begin discharge  Once forms are completed, please return to [facility] via fax.  Form placed: Ry Shukla MD sign me folder.     Thank you,  Ann Marie FRIEDMAN  [facility]  Team [facility]iera Coordinator

## 2020-04-30 ENCOUNTER — TELEPHONE (OUTPATIENT)
Dept: FAMILY MEDICINE | Facility: CLINIC | Age: 85
End: 2020-04-30

## 2020-04-30 NOTE — TELEPHONE ENCOUNTER
Faxed form to [facility]    Thank you!    Pamela ELLIS  [facility] Referral Rep

## 2020-04-30 NOTE — TELEPHONE ENCOUNTER
Faxed form to [facility]    Thank you!    Pamela ELLIS  [facility] Referral Rep

## 2020-04-30 NOTE — TELEPHONE ENCOUNTER
Faxed form on 4/29/20 to [facility]    Thank you!    Pamela ELLIS  [facility] Referral Rep

## 2020-04-30 NOTE — TELEPHONE ENCOUNTER
Forms received from: [facility]    Fax listed: [phone]  Date received: April 30, 2020  Form description: Revision to plan of care 04/17-06/15  Once forms are completed, please return to [facility] via fax.  Form placed: Ry Shukla MD sign me folder.     Thank you,  Ann Marie FRIEDMAN  [facility]  Team [facility]iera Coordinator

## 2020-05-01 NOTE — TELEPHONE ENCOUNTER
Form faxed on 4/29/20    Thank you!    Pamlea ELLIS  [facility] Referral Rep

## 2020-05-21 ENCOUNTER — TELEPHONE (OUTPATIENT)
Dept: FAMILY MEDICINE | Facility: CLINIC | Age: 85
End: 2020-05-21

## 2020-05-21 NOTE — TELEPHONE ENCOUNTER
Forms received from: [facility]    Fax listed: [phone]  Date received: May 21, 2020  Form description: Revision of POC   Once forms are completed, please return to [facility] via fax.  Form placed: Ry Shukla MD sign me folder.    Thank you,  Ann Marie FRIEDMAN  [facility]  Team [facility]iera Coordinator

## 2020-05-27 ENCOUNTER — TELEPHONE (OUTPATIENT)
Dept: FAMILY MEDICINE | Facility: CLINIC | Age: 85
End: 2020-05-27

## 2020-05-27 NOTE — TELEPHONE ENCOUNTER
Forms received from: [facility]    Fax listed: [phone]  Form completed by JQ and faxed.    Thank you,  Ann Marie FRIEDMAN  [facility]  Team [facility]iera Coordinator

## 2020-05-27 NOTE — TELEPHONE ENCOUNTER
Form signed and faxed.    Thank you!    Pamela ELLIS  [facility] Referral Rep

## 2020-05-28 ENCOUNTER — TELEPHONE (OUTPATIENT)
Dept: FAMILY MEDICINE | Facility: CLINIC | Age: 85
End: 2020-05-28

## 2020-05-28 NOTE — TELEPHONE ENCOUNTER
Forms received from: [facility]    Fax listed: [phone]  Date received: May 28, 2020  Form description: Revision of POC-order 30 day mid cert  Once forms are completed, please return to [facility] via fax.  Form placed: Ry Shukla MD sign me folder.    Thank you,  Ann Marie FRIEDMAN  [facility]  Team Ciera Coordinator

## 2020-05-29 ENCOUNTER — ANTICOAGULATION THERAPY VISIT (OUTPATIENT)
Dept: FAMILY MEDICINE | Facility: CLINIC | Age: 85
End: 2020-05-29

## 2020-05-29 LAB — INR PPP: 2.2 (ref 0.9–1.1)

## 2020-05-29 NOTE — PROGRESS NOTES
ANTICOAGULATION MANAGEMENT     Patient Name:  Israel Moyer  Date:  2020    ASSESSMENT /SUBJECTIVE:    Today's INR result of 2.2 is therapeutic. Goal INR of 2.0-3.0      Warfarin dose taken: Warfarin taken as previously instructed    Diet: No new diet changes affecting INR    Medication changes/ interactions: No new medications/supplements affecting INR    Previous INR: Therapeutic     S/S of bleeding or thromboembolism: No    New injury or illness: No    Upcoming surgery, procedure or cardioversion: No    Additional findings: None      PLAN:    Spoke with della Mosley care, regarding INR result and instructed:     Warfarin Dosing Instructions: Continue your current warfarin dose 5 mg on wed and 2.5 mg all other days    Instructed patient to follow up no later than: 6 weeks  Orders given to  Homecare nurse/facility to recheck    Education provided: None required      della Mos[facility] care, verbalizes understanding and agrees to warfarin dosing plan.    Instructed to call the Anticoagulation Clinic for any changes, questions or concerns. (#[phone])        OBJECTIVE:  INR   Date Value Ref Range Status   2020 2.2 (A) 0.90 - 1.10 Final         No question data found.  Anticoagulation Summary  As of 2020    INR goal:   2.0-3.0   TTR:   100.0 % (8.5 mo)   INR used for dosin.2 (2020)   Warfarin maintenance plan:   5 mg (5 mg x 1) every Wed; 2.5 mg (2.5 mg x 1) all other days   Full warfarin instructions:   5 mg every Wed; 2.5 mg all other days   Weekly warfarin total:   20 mg   Plan last modified:   Mel Rasmussen RN (2019)   Next INR check:   7/10/2020   Priority:   Maintenance   Target end date:            Anticoagulation Episode Summary     INR check location:       Preferred lab:   EXTERNAL LAB    Send INR reminders to:   DELIO ISRAEL    Comments:   Home Care

## 2020-06-02 ENCOUNTER — TELEPHONE (OUTPATIENT)
Dept: FAMILY MEDICINE | Facility: CLINIC | Age: 85
End: 2020-06-02

## 2020-06-02 NOTE — TELEPHONE ENCOUNTER
Forms received from [facility]/ Revision to Plan of Care - orders - SN to INR2.2on 05/29/20 - recheck on 7/10/20  for Zechariah Shukla MD.  Forms placed in provider 'sign me' folder.  Please fax forms to [phone] after completion.    Rut Cole  Patient Representative

## 2020-06-05 ENCOUNTER — TELEPHONE (OUTPATIENT)
Dept: FAMILY MEDICINE | Facility: CLINIC | Age: 85
End: 2020-06-05

## 2020-06-05 NOTE — TELEPHONE ENCOUNTER
Forms received from [facility] / Revision to Plan of Care - Services : PT 2 visits per week for 2 weeks  (start of care plan 04/17/20) for Zechariah Shukla MD.  Forms placed in provider 'sign me' folder.  Please fax forms to [phone] after completion.    Rut Cole  Patient Representative

## 2020-06-08 ENCOUNTER — TELEPHONE (OUTPATIENT)
Dept: FAMILY MEDICINE | Facility: CLINIC | Age: 85
End: 2020-06-08

## 2020-06-08 NOTE — TELEPHONE ENCOUNTER
Forms received from: [facility]    Fax listed: [phone]  Date received: June 8, 2020  Form description: Revision to POC SN 1 visit per week for 5 weeks   Once forms are completed, please return to [facility] via fax.  Form placed: Ry Shukla MD sign me folder.    Thank you,  Ann Marie FRIEDMAN  [facility]  Team [facility]iera Coordinator

## 2020-06-08 NOTE — TELEPHONE ENCOUNTER
Forms received from: [facility]    Fax listed: [phone]  Date received: June 8, 2020  Form description: [facility] POC Orders-problems and interventions  Once forms are completed, please return to [facility] via fax.  Form placed: Ry Shukla MD sign me folder.    Thank you,  Ann Marie FRIEDMAN  [facility]  Team [facility]iera Coordinator

## 2020-06-12 DIAGNOSIS — Z79.01 LONG TERM CURRENT USE OF ANTICOAGULANT THERAPY: ICD-10-CM

## 2020-06-12 DIAGNOSIS — I73.9 PAD (PERIPHERAL ARTERY DISEASE) (H): ICD-10-CM

## 2020-06-12 DIAGNOSIS — E78.5 HYPERLIPIDEMIA LDL GOAL <70: ICD-10-CM

## 2020-06-15 RX ORDER — WARFARIN SODIUM 2.5 MG/1
TABLET ORAL
Qty: 102 TABLET | Refills: 3 | Status: SHIPPED | OUTPATIENT
Start: 2020-06-15 | End: 2021-03-01

## 2020-06-15 NOTE — TELEPHONE ENCOUNTER
Warfarin,   Routing refill request to provider for review/approval because:  Labs out of range:  INR on 5/29/20 was 2.2    RICK GivensN, RN

## 2020-06-16 ENCOUNTER — TELEPHONE (OUTPATIENT)
Dept: FAMILY MEDICINE | Facility: CLINIC | Age: 85
End: 2020-06-16

## 2020-06-16 NOTE — TELEPHONE ENCOUNTER
Forms received from [facility]: physician's attestation of face to face encounter for Ry Shukla MD.  Forms placed in provider 'sign me' folder.  Please fax forms to [phone] after completion.    Shun De La Garza

## 2020-06-22 ENCOUNTER — TELEPHONE (OUTPATIENT)
Dept: FAMILY MEDICINE | Facility: CLINIC | Age: 85
End: 2020-06-22

## 2020-06-22 NOTE — TELEPHONE ENCOUNTER
Forms received from [facility]/ Revision to Plan of Care-  (cert period 06/12/20 to 08/10/20) orders  S: client seen for DC from PT  ETC.  for Zechariah Shukla MD.  Forms placed in provider 'sign me' folder.  Please fax forms to [phone] after completion.    Rut Cole  Patient Representative

## 2020-06-29 ENCOUNTER — TELEPHONE (OUTPATIENT)
Dept: FAMILY MEDICINE | Facility: CLINIC | Age: 85
End: 2020-06-29

## 2020-06-29 NOTE — TELEPHONE ENCOUNTER
Forms received from: [facility]   Fax listed: [phone]  Date received: June 29, 2020  Form description: [facility] & POC 6/12-08/10  Once forms are completed, please return to [facility]  via fax.  Form placed: Ry Shukla MD sign me folder.    Thank you,  Ann Marie FRIEDMAN  [facility] Coordinator

## 2020-07-01 ENCOUNTER — MEDICAL CORRESPONDENCE (OUTPATIENT)
Dept: HEALTH INFORMATION MANAGEMENT | Facility: CLINIC | Age: 85
End: 2020-07-01

## 2020-07-10 ENCOUNTER — ANTICOAGULATION THERAPY VISIT (OUTPATIENT)
Dept: FAMILY MEDICINE | Facility: CLINIC | Age: 85
End: 2020-07-10

## 2020-07-10 LAB — INR PPP: 2.5 (ref 0.9–1.1)

## 2020-07-10 NOTE — PROGRESS NOTES
ANTICOAGULATION MANAGEMENT     Patient Name:  Israel Moyer  Date:  7/10/2020    ASSESSMENT /SUBJECTIVE:    Today's INR result of 2.5 is therapeutic. Goal INR of 2.0-3.0      Warfarin dose taken: Warfarin taken as previously instructed    Diet: No new diet changes affecting INR    Medication changes/ interactions: No new medications/supplements affecting INR    Previous INR: Therapeutic     S/S of bleeding or thromboembolism: No    New injury or illness: No    Upcoming surgery, procedure or cardioversion: No    Additional findings: None      PLAN:    Spoke with Melany home care nurse, regarding INR result and instructed:     Warfarin Dosing Instructions: Continue your current warfarin dose 5 mg on wed and 2.5 mg all other days    Instructed patient to follow up no later than: 6 weeks  Orders given to  Homecare nurse/facility to recheck    Education provided: None required      anshul Mosley home care, verbalizes understanding and agrees to warfarin dosing plan.    Instructed to call the Anticoagulation Clinic for any changes, questions or concerns. (#[phone])        Maricruz Chao RN      OBJECTIVE:  Recent labs: (last 7 days)     07/10/20   INR 2.5*         No question data found.  Anticoagulation Summary  As of 7/10/2020    INR goal:   2.0-3.0   TTR:   100.0 % (9.9 mo)   INR used for dosin.5 (7/10/2020)   Warfarin maintenance plan:   5 mg (5 mg x 1) every Wed; 2.5 mg (2.5 mg x 1) all other days   Full warfarin instructions:   5 mg every Wed; 2.5 mg all other days   Weekly warfarin total:   20 mg   Plan last modified:   Mel Rasmussen RN (2019)   Next INR check:   2020   Priority:   Maintenance   Target end date:            Anticoagulation Episode Summary     INR check location:       Preferred lab:   EXTERNAL LAB    Send INR reminders to:   DELIO ISRAEL    Comments:   Home Care

## 2020-07-23 ENCOUNTER — TELEPHONE (OUTPATIENT)
Dept: FAMILY MEDICINE | Facility: CLINIC | Age: 85
End: 2020-07-23

## 2020-07-23 NOTE — TELEPHONE ENCOUNTER
[facility] revision to plan of care 6/12-8/10, SN to INR 2.5 on 7/10 for Ry Shukla MD.  Forms placed in provider 'sign me' folder.  Please fax forms to [phone] after completion.    Shun De La Garza

## 2020-07-27 ENCOUNTER — DOCUMENTATION ONLY (OUTPATIENT)
Dept: FAMILY MEDICINE | Facility: CLINIC | Age: 85
End: 2020-07-27

## 2020-07-27 DIAGNOSIS — I48.20 CHRONIC ATRIAL FIBRILLATION (H): Primary | ICD-10-CM

## 2020-07-27 NOTE — PROGRESS NOTES
ANTICOAGULATION MANAGEMENT      Israel Moyer due for annual renewal of referral to anticoagulation monitoring. Order pended for your review and signature.      ANTICOAGULATION SUMMARY      Warfarin indication(s)     Atrial fibrillation    Heart valve present?  NO       Current goal range   INR: 2.0-3.0     Goal appropriate for indication? Yes, INR 2-3 appropriate for hx of DVT, PE, hypercoagulable state, Afib, LVAD, or bileaflet AVR without risk factors     Current duration of therapy     Time in Therapeutic Range (TTR)  (Goal > 60%) 100 %       Office visit with referring provider's group within last year yes on 4/1/20       Oralia Shi RN

## 2020-07-31 ENCOUNTER — TELEPHONE (OUTPATIENT)
Dept: FAMILY MEDICINE | Facility: CLINIC | Age: 85
End: 2020-07-31

## 2020-07-31 NOTE — TELEPHONE ENCOUNTER
Detailed comments: Home care nurse wanting to update PCP that patient gain 6lbs in a period of 1 week    RN spoke with [facility] RN.     States lung sounds are clear, all lobes. Denies s/s CHF  Exacerbation. Denies SOB.     Confirms patient has been taking medications as prescribed.     Routed to PCP and in clinic providers to review and advise.     Mari Kaur, RN, BSN, PHN  [facility]: [place]

## 2020-07-31 NOTE — TELEPHONE ENCOUNTER
Reason for Call:  Other Patient info.    Detailed comments: Home care nurse wanting to update PCP that patient gain 6lbs in a period of 1 week.     Phone Number Patient can be reached at: Other phone number:  [phone]    Best Time: any     Can we leave a detailed message on this number? YES    Call taken on 7/31/2020 at 3:02 PM by Vania Contreras

## 2020-07-31 NOTE — TELEPHONE ENCOUNTER
Is patient followed by [facility] clinic?  If so I would direct call to them.      Does he have any lower extremity edema?  Looks like he is on Lasix 40 mg daily we can certainly increase this to 40 mg in the morning and 20 mg in the evening and recheck BMP next week.

## 2020-08-03 NOTE — TELEPHONE ENCOUNTER
RN attempted to contact C RN regarding patient's symptoms. Left VM to call back to triage line.     Mari Kaur RN, BSN, PHN  [facility]: [place]

## 2020-08-04 NOTE — TELEPHONE ENCOUNTER
RN spoke with [facility] RN - she did not observe lower extremity edema on assessment last Friday (7/31).     As of yesterday, patient continued to deny SOB or any other symptoms.     Routing to PCP to review and advise - continue current medication management of Lasix 40mg once daily, or increase to 40mg AM and 20mg PM as noted by Catia Avila below?     Mari Kaur RN, BSN, PHN  [facility]: [place]

## 2020-08-05 NOTE — TELEPHONE ENCOUNTER
Called Melany and relayed the message below. She stated she will let the patient know and keep that in mind at the next visit.     Mile Bird RN

## 2020-08-05 NOTE — TELEPHONE ENCOUNTER
Did they recheck his weight?    If his weight did increase above baseline by 6 pounds in 1 week then I would have him take the extra 20 mg for 5 days and recheck weights      If not changed from baseline weight then continue with previous dose.    Zechariah Shukla MD

## 2020-08-06 ENCOUNTER — TELEPHONE (OUTPATIENT)
Dept: FAMILY MEDICINE | Facility: CLINIC | Age: 85
End: 2020-08-06

## 2020-08-06 DIAGNOSIS — L03.019 CELLULITIS OF FINGER, UNSPECIFIED LATERALITY: Primary | ICD-10-CM

## 2020-08-06 RX ORDER — CEPHALEXIN 500 MG/1
500 CAPSULE ORAL 3 TIMES DAILY
Qty: 21 CAPSULE | Refills: 0 | Status: SHIPPED | OUTPATIENT
Start: 2020-08-06 | End: 2020-11-05

## 2020-08-06 NOTE — TELEPHONE ENCOUNTER
We can initiate antibiotic given home nurse evaluation    Review if it is blistering and if there is anything that would look like it needs to be drained. If so then we may need to see him    I will send in the RX    Orders Placed This Encounter     cephALEXin (KEFLEX) 500 MG capsule     Sig: Take 1 capsule (500 mg) by mouth 3 times daily     Dispense:  21 capsule     Refill:  0

## 2020-08-06 NOTE — TELEPHONE ENCOUNTER
Reason for call:  Patient reporting a symptom    Symptom or request:  Patients daughter is calling on behalf of patient. Patient has home care nurses that come everyday.  Home Care nurse noticed that patient has an infection on index finger. Home Care nurse told daughter to call his PCP and maybe he would be willing to call in a prescription for him. Please call to discuss.    Duration (how long have symptoms been present):  Daughter does not know how long he has had it    Have you been treated for this before? No    Additional comments:     Phone Number patient can be reached at:  Other phone number:  Ngozi Mcmillan/ daughter/ [phone]    Best Time:  any    Can we leave a detailed message on this number:  YES    Call taken on 8/6/2020 at 12:14 PM by Rut Cole

## 2020-08-06 NOTE — TELEPHONE ENCOUNTER
RN spoke with daughterNgozi (consent to communicate on file).     She received a call from patient's [facility] nurse - concern of infection starting on right index finger. Patient cannot remember what occurred, but daughter believes he burned himself while cooking a few days ago. She states the burned area appears white, with red and swollen surrounding tissue. Denies fever.     Per daughter, [facility] RN recommended to contact PCP to discuss possible antibiotic treatment. Daughter states patient is mostly homebound and difficult to come into clinic.     Routing to PCP - RN sees availability today at 3PM - could patient do telephone/virtual visit? Or OK to treat without visit? Pharmacy cued, if applicable.     Routed to PCP to review and advise.     Mari Kaur, RN, BSN, PHN  [facility]: [place]

## 2020-08-06 NOTE — TELEPHONE ENCOUNTER
RN relayed provider's message to patient's daughter. She verbalized understanding, denied area needing to be drained currently.     Mari Kaur RN, BSN, PHN  [facility]: [place]

## 2020-08-07 ENCOUNTER — TELEPHONE (OUTPATIENT)
Dept: FAMILY MEDICINE | Facility: CLINIC | Age: 85
End: 2020-08-07

## 2020-08-07 NOTE — TELEPHONE ENCOUNTER
Reason for Call:  Home Health Care    Melany with [facility] called regarding (reason for call):     Orders are needed for this patient.       Skilled Nursin x a week for 9 weeks     Home Health Aide: 2x a week for 9 weeks     Pt Provider: Dr. Shukla    Phone Number Homecare Nurse can be reached at: [phone]    Can we leave a detailed message on this number? YES        Call taken on 2020 at 4:33 PM by Lucila Peterson

## 2020-08-18 ENCOUNTER — TELEPHONE (OUTPATIENT)
Dept: FAMILY MEDICINE | Facility: CLINIC | Age: 85
End: 2020-08-18

## 2020-08-18 NOTE — TELEPHONE ENCOUNTER
Forms received from [facility]: HHC and POC, 8/11-10/09 for Ry Shukla MD.  Forms placed in provider 'sign me' folder.  Please fax forms to [phone] after completion.    Shun De La Garza

## 2020-08-20 ENCOUNTER — ANTICOAGULATION THERAPY VISIT (OUTPATIENT)
Dept: FAMILY MEDICINE | Facility: CLINIC | Age: 85
End: 2020-08-20

## 2020-08-20 DIAGNOSIS — I48.20 CHRONIC ATRIAL FIBRILLATION (H): ICD-10-CM

## 2020-08-20 LAB — INR PPP: 2.5 (ref 0.9–1.1)

## 2020-08-20 NOTE — PROGRESS NOTES
ANTICOAGULATION MANAGEMENT     Patient Name:  Israel Moyer  Date:  2020    ASSESSMENT /SUBJECTIVE:    Today's INR result of 2.5 is therapeutic. Goal INR of 2.0-3.0      Warfarin dose taken: Warfarin taken as previously instructed    Diet: No new diet changes affecting INR    Medication changes/ interactions: No new medications/supplements affecting INR    Previous INR: Therapeutic     S/S of bleeding or thromboembolism: No    New injury or illness: No    Upcoming surgery, procedure or cardioversion: No    Additional findings: None      PLAN:    Spoke with [facility] regarding INR result and instructed:     Warfarin Dosing Instructions: Continue your current warfarin dose 5 mg Wed, 2.5 mg all other days    Instructed patient to follow up no later than: 5 weeks  Orders given to  Homecare nurse/facility to recheck    Education provided: Monitoring for bleeding signs and symptoms and Monitoring for clotting signs and symptoms      Nurse Melany verbalizes understanding and agrees to warfarin dosing plan.    Instructed to call the Anticoagulation Clinic for any changes, questions or concerns. (#[phone])        Aranza Hernandes RN      OBJECTIVE:  Recent labs: (last 7 days)     20   INR 2.5*         No question data found.  Anticoagulation Summary  As of 2020    INR goal:   2.0-3.0   TTR:   100.0 % (11.3 mo)   Prior goal:   2.0-3.0   INR used for dosin.5 (2020)   Warfarin maintenance plan:   5 mg (5 mg x 1) every Wed; 2.5 mg (2.5 mg x 1) all other days   Full warfarin instructions:   5 mg every Wed; 2.5 mg all other days   Weekly warfarin total:   20 mg   No change documented:   Aranza Hernandes RN   Plan last modified:   Mel Rasmussen RN (2019)   Next INR check:   2020   Priority:   Maintenance   Target end date:   2021    Indications    Chronic atrial fibrillation (H) [I48.20]             Anticoagulation Episode Summary     INR check location:        Preferred lab:   EXTERNAL LAB    Send INR reminders to:   DELIO ISRAEL    Comments:   Home Care      Anticoagulation Care Providers     Provider Role Specialty Phone number    Ry Shukla MD Referring [facility] [phone]

## 2020-08-27 ENCOUNTER — TELEPHONE (OUTPATIENT)
Dept: FAMILY MEDICINE | Facility: CLINIC | Age: 85
End: 2020-08-27

## 2020-08-27 NOTE — TELEPHONE ENCOUNTER
"I see keflex Rx sent for finger infection per home nurse evaluation 8/6/20:      We can initiate antibiotic given home nurse evaluation     Review if it is blistering and if there is anything that would look like it needs to be drained. If so then we may need to see him    I called and spoke to Melany, she says patient has \"weird hands\" and arthritis anyhow and can't feel any of his fingers too well.   States it is not red, warm or painful.   Soft swelling from 2nd knuckle to tip of right index finger.      States color is normal.    I advised we should see patient per 's note and since this has been going on so long.   She advised we call patient directly to schedule.    Attempted to call patient at home/mobile number, left message on voicemail; patient was instructed to return call to [facility] clinic RN directly on the RN call back line at [phone].  Jocelyn David RN  [facility]          "

## 2020-08-27 NOTE — TELEPHONE ENCOUNTER
Reason for Call:  Other call back    Detailed comments: Melany called us to update on patient's finger infection. Melany states that the redness has gone away and no sign of infection. The finger just has a little scab on the end of the finger. Melany states it is still swollen-double the size and would like to know what they should do. She would like a call back to discuss as soon as possible.     Phone Number Patient can be reached at: Other phone number:  [phone](Mireya)    Best Time: As soon as possible    Can we leave a detailed message on this number? YES    Call taken on 8/27/2020 at 3:59 PM by Killian Zimmer

## 2020-08-28 ENCOUNTER — TELEPHONE (OUTPATIENT)
Dept: FAMILY MEDICINE | Facility: CLINIC | Age: 85
End: 2020-08-28

## 2020-08-28 NOTE — TELEPHONE ENCOUNTER
Forms received from [facility]: revision to plan of care, 8/20 for Ry Shukla MD.  Forms placed in provider 'sign me' folder.  Please fax forms to [phone] after completion.    Shun De La Garza

## 2020-08-31 NOTE — TELEPHONE ENCOUNTER
Attempt #2 to call patient.     Patient did not answer, RN left voicemail at home number. RN requested patient return call to Nurse Triage line at [phone].     Mari Kaur RN, BSN, PHN  [facility]: [place]

## 2020-09-01 NOTE — TELEPHONE ENCOUNTER
Attempt #3 to call patient.     Patient did not answer, RN left voicemail at home number. RN requested patient return call to Nurse Triage line at [phone].     TC, please send letter.    Mari Kaur RN, BSN, PHN  [facility]: [place]

## 2020-09-01 NOTE — TELEPHONE ENCOUNTER
Ngozi left message on  that phone calls should go to her or Melany instead of patient.  Unsure if Ngozi is a family member or from home care.  Left message for Ngozi to call back on RN line at [phone].    Bobo Alexandre RN

## 2020-09-08 DIAGNOSIS — Z53.9 DIAGNOSIS NOT YET DEFINED: Primary | ICD-10-CM

## 2020-09-08 PROCEDURE — G0179 MD RECERTIFICATION HHA PT: HCPCS | Performed by: FAMILY MEDICINE

## 2020-09-21 NOTE — PROGRESS NOTES
ANTICOAGULATION FOLLOW-UP CLINIC VISIT    Patient Name:  Israel Moyer  Date:  2/1/2018  Contact Type:  Face to Face    SUBJECTIVE:     Patient Findings     Positives No Problem Findings           OBJECTIVE    INR Protime   Date Value Ref Range Status   02/01/2018 2.8 (A) 0.86 - 1.14 Final       ASSESSMENT / PLAN  INR assessment THER    Recheck INR In: 2 WEEKS    INR Location Clinic      Anticoagulation Summary as of 2/1/2018     INR goal 2.0-3.0   Today's INR 2.8   Maintenance plan 5 mg (2.5 mg x 2) on Mon; 2.5 mg (2.5 mg x 1) all other days   Full instructions 5 mg on Mon; 2.5 mg all other days   Weekly total 20 mg   Plan last modified Jamaica Christensen, RN (2/1/2018)   Next INR check 2/15/2018   Target end date Indefinite    Indications   Long term current use of anticoagulant therapy [Z79.01]  ATRIAL FIBRILLATION [I48.91]         Anticoagulation Episode Summary     INR check location [facility]    Preferred lab     Send INR reminders to [facility]    Comments             See the Encounter Report to view Anticoagulation Flowsheet and Dosing Calendar (Go to Encounters tab in chart review, and find the Anticoagulation Therapy Visit)    Dosage adjustment made based on physician directed care plan.    Jamaica Christensen, RN                O-Z Flap Text: The defect edges were debeveled with a #15 scalpel blade.  Given the location of the defect, shape of the defect and the proximity to free margins an O-Z flap was deemed most appropriate.  Using a sterile surgical marker, an appropriate transposition flap was drawn incorporating the defect and placing the expected incisions within the relaxed skin tension lines where possible. The area thus outlined was incised deep to adipose tissue with a #15 scalpel blade.  The skin margins were undermined to an appropriate distance in all directions utilizing iris scissors.

## 2020-09-24 ENCOUNTER — ANTICOAGULATION THERAPY VISIT (OUTPATIENT)
Dept: FAMILY MEDICINE | Facility: CLINIC | Age: 85
End: 2020-09-24

## 2020-09-24 DIAGNOSIS — I48.20 CHRONIC ATRIAL FIBRILLATION (H): ICD-10-CM

## 2020-09-24 LAB — INR PPP: 2.6 (ref 0.9–1.1)

## 2020-09-24 NOTE — PROGRESS NOTES
ANTICOAGULATION MANAGEMENT     Patient Name:  Israel Moyer  Date:  2020    ASSESSMENT /SUBJECTIVE:    Today's INR result of 2.6 is therapeutic. Goal INR of 2.0-3.0      Warfarin dose taken: Warfarin taken as instructed    Diet: No new diet changes affecting INR    Medication changes/ interactions: No new medications/supplements affecting INR    Previous INR: Therapeutic     S/S of bleeding or thromboembolism: No    New injury or illness: No    Upcoming surgery, procedure or cardioversion: No    Additional findings: None      PLAN:    Telephone call with home care nurse Rachel regarding INR result and instructed:     Warfarin Dosing Instructions: Continue your current warfarin dose 5 mg W; 2.5 mg ROW    Instructed patient to follow up no later than: 6 weeks  Orders given to  Homecare nurse/facility to recheck    Education provided: None required      Rachel verbalizes understanding and agrees to warfarin dosing plan.    Instructed to call the Anticoagulation Clinic for any changes, questions or concerns. (#[phone])        Annabella Amaya RN      OBJECTIVE:  Recent labs: (last 7 days)     20   INR 2.6*         INR assessment THER    Recheck INR In: 6 WEEKS    INR Location Homecare INR      Anticoagulation Summary  As of 2020    INR goal:   2.0-3.0   TTR:   100.0 % (1 y)   INR used for dosin.6 (2020)   Warfarin maintenance plan:   5 mg (5 mg x 1) every Wed; 2.5 mg (2.5 mg x 1) all other days   Full warfarin instructions:   5 mg every Wed; 2.5 mg all other days   Weekly warfarin total:   20 mg   No change documented:   Dagmar Piña RN   Plan last modified:   Mel Rasmussen RN (2019)   Next INR check:   2020   Priority:   Maintenance   Target end date:   2021    Indications    Chronic atrial fibrillation (H) [I48.20]             Anticoagulation Episode Summary     INR check location:       Preferred lab:   EXTERNAL LAB    Send INR reminders to:    DELIO ISRAEL    Comments:   Home Care      Anticoagulation Care Providers     Provider Role Specialty Phone number    Ry Shukla MD [facility] [phone]

## 2020-10-01 ENCOUNTER — TELEPHONE (OUTPATIENT)
Dept: FAMILY MEDICINE | Facility: CLINIC | Age: 85
End: 2020-10-01

## 2020-10-01 NOTE — TELEPHONE ENCOUNTER
Routed to provider. Please see message below.     Melany with [facility] is looking for an okay to use bacitracin and gauze     Thank you.   Mile Bird RN

## 2020-10-01 NOTE — TELEPHONE ENCOUNTER
I called and spoke to Melany, advised her of Dr. Shukla's order/response.    She verbalized understanding and agreement.    Jocelyn David RN  [facility]

## 2020-10-01 NOTE — TELEPHONE ENCOUNTER
Reason for call:  Patient reporting a symptom    Symptom or request: Burn on Left thumb due to grabbing a hot plate    Duration (how long have symptoms been present): 3 days    Have you been treated for this before? No    Additional comments: Melany with [facility] is looking for an okay to use bacitracin and gauze       Ph for Melany ( [facility]) [phone]    Can we leave a detailed message on this number:  YES    Call taken on 10/1/2020 at 2:34 PM by Lucila Peterson

## 2020-10-01 NOTE — TELEPHONE ENCOUNTER
Ok to use bacitracin and protect with gauze. Change dressing 2 times per day    Let us know if it is not improving

## 2020-10-07 ENCOUNTER — TELEPHONE (OUTPATIENT)
Dept: FAMILY MEDICINE | Facility: CLINIC | Age: 85
End: 2020-10-07

## 2020-10-07 NOTE — TELEPHONE ENCOUNTER
Forms received from [facility]: revision to plan of care, 9/24 for Ry Shukla MD.  Forms placed in provider 'sign me' folder.  Please fax forms to [phone] after completion.    Shun De La Garza

## 2020-10-12 ENCOUNTER — TELEPHONE (OUTPATIENT)
Dept: FAMILY MEDICINE | Facility: CLINIC | Age: 85
End: 2020-10-12

## 2020-10-12 NOTE — TELEPHONE ENCOUNTER
Forms received from [employer]/ Can you confirm the diagnosis for Chronic Kidney Disease Stage 3 for this patient? (dated 10/06/20)  for Zechariah Shukla MD.  Forms placed in provider 'sign me' folder.  Please fax forms to [phone] after completion.    Rut Cole  Patient Representative

## 2020-10-14 ENCOUNTER — TELEPHONE (OUTPATIENT)
Dept: FAMILY MEDICINE | Facility: CLINIC | Age: 85
End: 2020-10-14

## 2020-10-14 NOTE — TELEPHONE ENCOUNTER
Forms received from: [facility]     Fax listed: [phone]  Date received: October 14, 2020  Form description: Orders-homemake 1-3hr per week 8/7-10/09  Once forms are completed, please return to [facility] via fax.  Form placed: Ry Shukla MD sign me folder.    Thank you,  Ann Marie FRIEDMAN  [facility]  Team [place] Coordinator

## 2020-10-14 NOTE — TELEPHONE ENCOUNTER
Reason for Call: Request for an order or referral:    Order or referral being requested:  Orders     Date needed: as soon as possible    Has the patient been seen by the PCP for this problem? YES    Additional comments:  Lindsay from [facility] calling about getting verbal orders for SN 1 visit per for 9 weeks medication and wound care any questions please call Lindsay.    Phone number Patient can be reached at:  Other phone number:  [phone]    Best Time:  Anytime     Can we leave a detailed message on this number?  YES    Call taken on 10/14/2020 at 8:47 AM by Lindsey Worthy

## 2020-10-16 ENCOUNTER — TELEPHONE (OUTPATIENT)
Dept: FAMILY MEDICINE | Facility: CLINIC | Age: 85
End: 2020-10-16

## 2020-10-16 NOTE — TELEPHONE ENCOUNTER
Forms received from: [facility]    Fax listed: [phone]  Date received: October 16, 2020  Form description: Revision of POC Orders S transfer from agency  Once forms are completed, please return to [facility] via fax.  Form placed: Ry Shukla MD sign me folder.    Thank you,  Ann Marie FRIEDMAN  [facility]  Team [place] Coordinator

## 2020-10-19 ENCOUNTER — TELEPHONE (OUTPATIENT)
Dept: FAMILY MEDICINE | Facility: CLINIC | Age: 85
End: 2020-10-19

## 2020-10-19 NOTE — TELEPHONE ENCOUNTER
Forms received from: [facility]    Fax listed: [phone]  Date received: October 19, 2020  Form description: HHC & POC 10/10-12/08  Once forms are completed, please return to [facility] via fax.  Form placed: Ry Shukla MD sign me folder.    Thank you,  Ann Marie FRIEDMAN  [facility]  Team [place] Coordinator

## 2020-10-22 DIAGNOSIS — Z53.9 DIAGNOSIS NOT YET DEFINED: Primary | ICD-10-CM

## 2020-10-22 PROCEDURE — G0179 MD RECERTIFICATION HHA PT: HCPCS | Performed by: FAMILY MEDICINE

## 2020-10-26 ENCOUNTER — TELEPHONE (OUTPATIENT)
Dept: FAMILY MEDICINE | Facility: CLINIC | Age: 85
End: 2020-10-26

## 2020-10-26 NOTE — TELEPHONE ENCOUNTER
Forms received from: [facility]    Fax listed: [phone]  Date received: October 26, 2020  Form description: Revision to POC -received updated diagnosis code N18.31 on 10/22  Once forms are completed, please return to [facility] via fax.  Form placed: Ry Shukla MD sign me folder.    Thank you,  Ann Marie FRIEDMAN  [facility]  Team [place] Coordinator

## 2020-10-28 DIAGNOSIS — I73.9 PAD (PERIPHERAL ARTERY DISEASE) (H): ICD-10-CM

## 2020-10-28 DIAGNOSIS — E78.5 HYPERLIPIDEMIA LDL GOAL <70: ICD-10-CM

## 2020-10-28 RX ORDER — ROSUVASTATIN CALCIUM 40 MG/1
TABLET, COATED ORAL
Qty: 90 TABLET | Refills: 0 | Status: SHIPPED | OUTPATIENT
Start: 2020-10-28 | End: 2021-01-22

## 2020-10-28 NOTE — TELEPHONE ENCOUNTER
Unable to refill per FM protocol.  Med and pharm loaded.    Deidre CABRERAN, RN    [facility]  [facility]  Office: [phone]  Fax: [phone]

## 2020-10-29 ENCOUNTER — TELEPHONE (OUTPATIENT)
Dept: FAMILY MEDICINE | Facility: CLINIC | Age: 85
End: 2020-10-29

## 2020-10-29 NOTE — TELEPHONE ENCOUNTER
RN relayed provider's verbal order to Melany [facility] RN.     The patient will call back to schedule once he is able to determine his transportation.     Mari Kaur RN, BSN, PHN  [facility]: [place]

## 2020-10-29 NOTE — TELEPHONE ENCOUNTER
Ok to to restart lasix    He needs to be seen. I am not able to see him. Possibly Dr Gao at . He could then assume his primary care since he will be coming here when  closes.

## 2020-10-29 NOTE — TELEPHONE ENCOUNTER
Reason for Call:  Other     Detailed comments: Patient had lasix discontinued and the hospital due to poor kidney functions. Recently he has had weeping of the legs and open sores. Would like to reinstate lasix until these symptoms have subsided. Please advise    Phone Number   Melany [facility] [phone]         Best Time:     Can we leave a detailed message on this number? YES    Call taken on 10/29/2020 at 10:33 AM by Ramandeep Lentz

## 2020-11-03 ENCOUNTER — TELEPHONE (OUTPATIENT)
Dept: FAMILY MEDICINE | Facility: CLINIC | Age: 85
End: 2020-11-03

## 2020-11-03 NOTE — TELEPHONE ENCOUNTER
Forms received from [facility]/ Revision to Plan of Care - Received verbal orders from Dr Abraham office on 10/29 for Zechariah Shukla MD.  Forms placed in provider 'sign me' folder.  Please fax forms to [phone] after completion.    Rut Cole  Patient Representative

## 2020-11-05 ENCOUNTER — ANTICOAGULATION THERAPY VISIT (OUTPATIENT)
Dept: FAMILY MEDICINE | Facility: CLINIC | Age: 85
End: 2020-11-05
Payer: COMMERCIAL

## 2020-11-05 DIAGNOSIS — I48.20 CHRONIC ATRIAL FIBRILLATION (H): ICD-10-CM

## 2020-11-05 LAB — INR PPP: 1.8 (ref 0.9–1.1)

## 2020-11-05 PROCEDURE — 99207 PR NO CHARGE NURSE ONLY: CPT | Performed by: FAMILY MEDICINE

## 2020-11-05 NOTE — PROGRESS NOTES
ANTICOAGULATION FOLLOW-UP CLINIC VISIT    Patient Name:  Israel Moyer  Date:  2020  Contact Type:  Telephone/ Bonita BARRERA RN    SUBJECTIVE:  Patient Findings     Positives:  Change in health    Comments:  Patient was hospitalized last month and had left middle finger amputated. Currently receiving homecare services. Might have eaten more greens than normal the past couple of days. Med rec completed with HC RN.         Clinical Outcomes     Negatives:  Major bleeding event, Thromboembolic event, Anticoagulation-related hospital admission, Anticoagulation-related ED visit, Anticoagulation-related fatality    Comments:  Patient was hospitalized last month and had left middle finger amputated. Currently receiving homecare services. Might have eaten more greens than normal the past couple of days. Med rec completed with [facility] RN.            OBJECTIVE    Recent labs: (last 7 days)     20   INR 1.8*       ASSESSMENT / PLAN  INR assessment SUB    Recheck INR In: 1 WEEK    INR Location [facility] INR      Anticoagulation Summary  As of 2020    INR goal:  2.0-3.0   TTR:  97.1 % (1 y)   INR used for dosin.8 (2020)   Warfarin maintenance plan:  5 mg (5 mg x 1) every Wed; 2.5 mg (2.5 mg x 1) all other days   Full warfarin instructions:  : 5 mg; Otherwise 5 mg every Wed; 2.5 mg all other days   Weekly warfarin total:  20 mg   Plan last modified:  Mel Rasmussen, RN (2019)   Next INR check:  2020   Priority:  Maintenance   Target end date:  2021    Indications    Chronic atrial fibrillation (H) [I48.20]             Anticoagulation Episode Summary     INR check location:      Preferred lab:  EXTERNAL LAB    Send INR reminders to:  DELIO ISRAEL    Comments:  Home Care      Anticoagulation Care Providers     Provider Role Specialty Phone number    Ry Shukla MD Referring Family Medicine [phone]            See the Encounter Report to view Anticoagulation  Flowsheet and Dosing Calendar (Go to Encounters tab in chart review, and find the Anticoagulation Therapy Visit)    Patient INR is sub therapeutic today.  Patient will take 5 mg.  Will then continue weekly maintenance dose of 20 mg and follow up in 1 week or sooner if there are any concerns or problems. It has been over a month since INR was last checked, will try a boost before adjusting maintenance dose.    Discussed signs of clotting with patient and when to seek care for concerns.  Patient verbalized understanding    Rationale to adjustments: Dosage adjustment made based on physician directed care plan.      Eileen Torres RN

## 2020-11-11 ENCOUNTER — TELEPHONE (OUTPATIENT)
Dept: FAMILY MEDICINE | Facility: CLINIC | Age: 85
End: 2020-11-11

## 2020-11-12 ENCOUNTER — ANTICOAGULATION THERAPY VISIT (OUTPATIENT)
Dept: FAMILY MEDICINE | Facility: CLINIC | Age: 85
End: 2020-11-12

## 2020-11-12 DIAGNOSIS — I48.20 CHRONIC ATRIAL FIBRILLATION (H): ICD-10-CM

## 2020-11-12 LAB — INR PPP: 2.2 (ref 0.9–1.1)

## 2020-11-12 NOTE — PROGRESS NOTES
ANTICOAGULATION MANAGEMENT     Patient Name:  Israel Moyer  Date:  2020    ASSESSMENT /SUBJECTIVE:    Today's INR result of 2.2 is therapeutic. Goal INR of 2.0-3.0      Warfarin dose taken: Warfarin taken as instructed    Diet: No new diet changes affecting INR    Medication changes/ interactions: No new medications/supplements affecting INR    Previous INR: Subtherapeutic     S/S of bleeding or thromboembolism: No    New injury or illness: No    Upcoming surgery, procedure or cardioversion: No    Additional findings: None      PLAN:    Telephone call with home care nurse Melany regarding INR result and instructed:     Warfarin Dosing Instructions: Continue your current warfarin dose 5 mg on wed and 2.5 mg all other days    Instructed patient to follow up no later than: 1 week  Orders given to  Homecare nurse/facility to recheck    Education provided: None required      Melany verbalizes understanding and agrees to warfarin dosing plan.    Instructed to call the Anticoagulation Clinic for any changes, questions or concerns. (#[phone])        Maricruz Chao RN      OBJECTIVE:  Recent labs: (last 7 days)     20   INR 2.2*         No question data found.  Anticoagulation Summary  As of 2020    INR goal:  2.0-3.0   TTR:  96.2 % (1 y)   INR used for dosin.2 (2020)   Warfarin maintenance plan:  5 mg (5 mg x 1) every Wed; 2.5 mg (2.5 mg x 1) all other days   Full warfarin instructions:  5 mg every Wed; 2.5 mg all other days   Weekly warfarin total:  20 mg   Plan last modified:  Mel Rasmussen RN (2019)   Next INR check:     Priority:  Maintenance   Target end date:  2021    Indications    Chronic atrial fibrillation (H) [I48.20]             Anticoagulation Episode Summary     INR check location:      Preferred lab:  EXTERNAL LAB    Send INR reminders to:  DELIO ISRAEL    Comments:  Home Care      Anticoagulation Care Providers     Provider Role Specialty Phone number     Ry Shukla MD [facility] [phone]

## 2020-11-19 ENCOUNTER — ANTICOAGULATION THERAPY VISIT (OUTPATIENT)
Dept: FAMILY MEDICINE | Facility: CLINIC | Age: 85
End: 2020-11-19

## 2020-11-19 DIAGNOSIS — I48.20 CHRONIC ATRIAL FIBRILLATION (H): ICD-10-CM

## 2020-11-19 LAB — INR PPP: 2.1 (ref 0.9–1.1)

## 2020-11-19 NOTE — PROGRESS NOTES
ANTICOAGULATION MANAGEMENT     Patient Name:  Israel Moyer  Date:  2020    ASSESSMENT /SUBJECTIVE:    Today's INR result of 2.1 is therapeutic. Goal INR of 2.0-3.0      Warfarin dose taken: Warfarin taken as instructed    Diet: No new diet changes affecting INR    Medication changes/ interactions: No new medications/supplements affecting INR    Previous INR: Therapeutic     S/S of bleeding or thromboembolism: No    New injury or illness: No    Upcoming surgery, procedure or cardioversion: No    Additional findings: None      PLAN:    Telephone call with home care nurse [facility] regarding INR result and instructed:     Warfarin Dosing Instructions: Continue your current warfarin dose 5 mg on wed and 2.5 mg all other dasy    Instructed patient to follow up no later than: 2 weeks  Orders given to  Homecare nurse/facility to recheck    Education provided: None required      della [facility] care, verbalizes understanding and agrees to warfarin dosing plan.    Instructed to call the Anticoagulation Clinic for any changes, questions or concerns. (#[phone])        Maricruz Chao RN      OBJECTIVE:  Recent labs: (last 7 days)     20   INR 2.1*         No question data found.  Anticoagulation Summary  As of 2020    INR goal:  2.0-3.0   TTR:  96.2 % (1 y)   INR used for dosin.1 (2020)   Warfarin maintenance plan:  5 mg (5 mg x 1) every Wed; 2.5 mg (2.5 mg x 1) all other days   Full warfarin instructions:  5 mg every Wed; 2.5 mg all other days   Weekly warfarin total:  20 mg   Plan last modified:  Mel Rasmussen RN (2019)   Next INR check:  12/3/2020   Priority:  Maintenance   Target end date:  2021    Indications    Chronic atrial fibrillation (H) [I48.20]             Anticoagulation Episode Summary     INR check location:      Preferred lab:  EXTERNAL LAB    Send INR reminders to:  DELIO ISRAEL    Comments:  Home Care      Anticoagulation Care Providers     Provider  Role Specialty Phone number    Ry Shukla MD Referring Family Medicine [phone]

## 2020-11-24 ENCOUNTER — TELEPHONE (OUTPATIENT)
Dept: FAMILY MEDICINE | Facility: CLINIC | Age: 85
End: 2020-11-24

## 2020-11-24 NOTE — TELEPHONE ENCOUNTER
RN spoke with ara Melvin to communicate on file.     She states that patient was taken off of diuretic medication after October hospitalization, but then restarted approximately 2 weeks ago after [facility] noticed increased leg swelling and weight gain. Patient has since had increased urgency to urinate, and has had a few episodes of incontinence because he has difficulty ambulated to get to the bathroom.     RN advised that increased urinary frequency can be a common side effect of diuretic medication. RN advised to schedule a visit to establish care with new PCP and discuss concerns further at visit. She will call back at a later time to schedule.     Mari Kaur RN, BSN, PHN  [facility]: [place]

## 2020-11-24 NOTE — TELEPHONE ENCOUNTER
Reason for Call:  Other     Detailed comments: please call the patients daughter Ngozi about the patient taking water pills, she says that the patient is wetting himself all the time and would like to know if that could be from the pills     Phone Number Patient can be reached at: Other phone number:  [phone]    Best Time: any    Can we leave a detailed message on this number? YES    Call taken on 11/24/2020 at 8:15 AM by Ruby Matos

## 2020-11-25 ENCOUNTER — TELEPHONE (OUTPATIENT)
Dept: FAMILY MEDICINE | Facility: CLINIC | Age: 85
End: 2020-11-25

## 2020-11-25 ENCOUNTER — OFFICE VISIT (OUTPATIENT)
Dept: FAMILY MEDICINE | Facility: CLINIC | Age: 85
End: 2020-11-25
Payer: COMMERCIAL

## 2020-11-25 ENCOUNTER — PATIENT OUTREACH (OUTPATIENT)
Dept: CARE COORDINATION | Facility: CLINIC | Age: 85
End: 2020-11-25

## 2020-11-25 VITALS
DIASTOLIC BLOOD PRESSURE: 72 MMHG | TEMPERATURE: 98.1 F | OXYGEN SATURATION: 97 % | SYSTOLIC BLOOD PRESSURE: 109 MMHG | BODY MASS INDEX: 32.21 KG/M2 | WEIGHT: 225 LBS | HEIGHT: 70 IN | HEART RATE: 100 BPM

## 2020-11-25 DIAGNOSIS — R32 URINARY INCONTINENCE, UNSPECIFIED TYPE: ICD-10-CM

## 2020-11-25 DIAGNOSIS — R32 BLADDER LEAK: Primary | ICD-10-CM

## 2020-11-25 DIAGNOSIS — Z71.89 COMPLEX CARE COORDINATION: ICD-10-CM

## 2020-11-25 DIAGNOSIS — I48.20 CHRONIC ATRIAL FIBRILLATION (H): ICD-10-CM

## 2020-11-25 DIAGNOSIS — I50.9 CONGESTIVE HEART FAILURE, UNSPECIFIED HF CHRONICITY, UNSPECIFIED HEART FAILURE TYPE (H): ICD-10-CM

## 2020-11-25 DIAGNOSIS — N18.30 STAGE 3 CHRONIC KIDNEY DISEASE, UNSPECIFIED WHETHER STAGE 3A OR 3B CKD (H): ICD-10-CM

## 2020-11-25 DIAGNOSIS — R30.0 DYSURIA: ICD-10-CM

## 2020-11-25 DIAGNOSIS — I10 HYPERTENSION GOAL BP (BLOOD PRESSURE) < 140/90: ICD-10-CM

## 2020-11-25 DIAGNOSIS — N39.0 ACUTE UTI (URINARY TRACT INFECTION): ICD-10-CM

## 2020-11-25 DIAGNOSIS — Z13.220 SCREENING FOR HYPERLIPIDEMIA: ICD-10-CM

## 2020-11-25 DIAGNOSIS — E66.01 MORBID OBESITY (H): ICD-10-CM

## 2020-11-25 LAB
ALBUMIN UR-MCNC: 100 MG/DL
APPEARANCE UR: CLEAR
BACTERIA #/AREA URNS HPF: ABNORMAL /HPF
BASOPHILS # BLD AUTO: 0 10E9/L (ref 0–0.2)
BASOPHILS NFR BLD AUTO: 0.3 %
BILIRUB UR QL STRIP: NEGATIVE
COLOR UR AUTO: YELLOW
DIFFERENTIAL METHOD BLD: ABNORMAL
EOSINOPHIL # BLD AUTO: 0.1 10E9/L (ref 0–0.7)
EOSINOPHIL NFR BLD AUTO: 0.9 %
ERYTHROCYTE [DISTWIDTH] IN BLOOD BY AUTOMATED COUNT: 16.2 % (ref 10–15)
GLUCOSE UR STRIP-MCNC: NEGATIVE MG/DL
HCT VFR BLD AUTO: 35.6 % (ref 40–53)
HGB BLD-MCNC: 11.1 G/DL (ref 13.3–17.7)
HGB UR QL STRIP: ABNORMAL
KETONES UR STRIP-MCNC: NEGATIVE MG/DL
LEUKOCYTE ESTERASE UR QL STRIP: ABNORMAL
LYMPHOCYTES # BLD AUTO: 0.7 10E9/L (ref 0.8–5.3)
LYMPHOCYTES NFR BLD AUTO: 6.8 %
MCH RBC QN AUTO: 30.2 PG (ref 26.5–33)
MCHC RBC AUTO-ENTMCNC: 31.2 G/DL (ref 31.5–36.5)
MCV RBC AUTO: 97 FL (ref 78–100)
MONOCYTES # BLD AUTO: 0.9 10E9/L (ref 0–1.3)
MONOCYTES NFR BLD AUTO: 8.6 %
NEUTROPHILS # BLD AUTO: 9 10E9/L (ref 1.6–8.3)
NEUTROPHILS NFR BLD AUTO: 83.4 %
NITRATE UR QL: NEGATIVE
NON-SQ EPI CELLS #/AREA URNS LPF: ABNORMAL /LPF
PH UR STRIP: 5.5 PH (ref 5–7)
PLATELET # BLD AUTO: 139 10E9/L (ref 150–450)
RBC # BLD AUTO: 3.67 10E12/L (ref 4.4–5.9)
RBC #/AREA URNS AUTO: ABNORMAL /HPF
SOURCE: ABNORMAL
SP GR UR STRIP: 1.01 (ref 1–1.03)
UROBILINOGEN UR STRIP-ACNC: 0.2 EU/DL (ref 0.2–1)
WBC # BLD AUTO: 10.7 10E9/L (ref 4–11)
WBC #/AREA URNS AUTO: >100 /HPF

## 2020-11-25 PROCEDURE — 87086 URINE CULTURE/COLONY COUNT: CPT | Performed by: NURSE PRACTITIONER

## 2020-11-25 PROCEDURE — 81001 URINALYSIS AUTO W/SCOPE: CPT | Performed by: NURSE PRACTITIONER

## 2020-11-25 PROCEDURE — 99215 OFFICE O/P EST HI 40 MIN: CPT | Performed by: NURSE PRACTITIONER

## 2020-11-25 PROCEDURE — 85025 COMPLETE CBC W/AUTO DIFF WBC: CPT | Performed by: NURSE PRACTITIONER

## 2020-11-25 PROCEDURE — 80048 BASIC METABOLIC PNL TOTAL CA: CPT | Performed by: NURSE PRACTITIONER

## 2020-11-25 PROCEDURE — 82043 UR ALBUMIN QUANTITATIVE: CPT | Performed by: NURSE PRACTITIONER

## 2020-11-25 PROCEDURE — 87186 SC STD MICRODIL/AGAR DIL: CPT | Performed by: NURSE PRACTITIONER

## 2020-11-25 PROCEDURE — 87088 URINE BACTERIA CULTURE: CPT | Performed by: NURSE PRACTITIONER

## 2020-11-25 PROCEDURE — 36415 COLL VENOUS BLD VENIPUNCTURE: CPT | Performed by: NURSE PRACTITIONER

## 2020-11-25 RX ORDER — SULFAMETHOXAZOLE/TRIMETHOPRIM 800-160 MG
1 TABLET ORAL 2 TIMES DAILY
Qty: 14 TABLET | Refills: 0 | Status: SHIPPED | OUTPATIENT
Start: 2020-11-25 | End: 2020-12-02

## 2020-11-25 ASSESSMENT — MIFFLIN-ST. JEOR: SCORE: 1721.84

## 2020-11-25 NOTE — TELEPHONE ENCOUNTER
ANTICOAGULATION  MANAGEMENT     Interacting Medication Review    Interacting medication(s): Sulfamethoxazole-Trimethoprim (Bactrim) with warfarin.    Duration: 7 days (11/25/20 to 12/1/20)    New medication?: Yes, interaction may increase INR and risk of bleeding       PLAN     Continue current warfarin dose. Recommend to check INR on 11/27/20    Left a detailed message for Melany, Home Care  with [facility]    Anticoagulation Calendar updated    William Hernandez RN

## 2020-11-25 NOTE — PROGRESS NOTES
Clinic Care Coordination Contact  [facility]/Voicemail       Clinical Data: CHW Outreach  Outreach attempted x 1. Left message on patient's voicemail with call back information and requested return call.    Plan: CHW will try to reach patient again in 1-2 business days.    Sarah HENDERSON Community Health Worker  Clinic Care Coordination  [facility] : [place] [facility] [place] [facility]  Phone: [phone]      Reason for Referral: pt's daughter requesting help with patient trying to get him into an assisted living facility. Patient is in agreement with referral today

## 2020-11-25 NOTE — TELEPHONE ENCOUNTER
Return call to [facility] from [facility]. She said that they wouldn't be able to check INR on Friday but will be able to check INR with patient on Monday 11/30/20. [facility] calendar updated.     William Hernandez RN

## 2020-11-25 NOTE — TELEPHONE ENCOUNTER
Forms received from [facility]/ Revision to Plan of Care - Orders --SN to INR 2.1 on 11/19/20. for Zechariah Shukla MD.  Forms placed in provider 'sign me' folder.  Please fax forms to [phone] after completion.    Rut Cole  Patient Representative
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
## 2020-11-26 LAB
ANION GAP SERPL CALCULATED.3IONS-SCNC: 6 MMOL/L (ref 3–14)
BUN SERPL-MCNC: 28 MG/DL (ref 7–30)
CALCIUM SERPL-MCNC: 8.3 MG/DL (ref 8.5–10.1)
CHLORIDE SERPL-SCNC: 102 MMOL/L (ref 94–109)
CO2 SERPL-SCNC: 27 MMOL/L (ref 20–32)
CREAT SERPL-MCNC: 1.52 MG/DL (ref 0.66–1.25)
CREAT UR-MCNC: 141 MG/DL
GFR SERPL CREATININE-BSD FRML MDRD: 42 ML/MIN/{1.73_M2}
GLUCOSE SERPL-MCNC: 127 MG/DL (ref 70–99)
MICROALBUMIN UR-MCNC: 455 MG/L
MICROALBUMIN/CREAT UR: 322.7 MG/G CR (ref 0–17)
POTASSIUM SERPL-SCNC: 4 MMOL/L (ref 3.4–5.3)
SODIUM SERPL-SCNC: 135 MMOL/L (ref 133–144)

## 2020-11-27 ENCOUNTER — PATIENT OUTREACH (OUTPATIENT)
Dept: CARE COORDINATION | Facility: CLINIC | Age: 85
End: 2020-11-27

## 2020-11-27 ENCOUNTER — PATIENT OUTREACH (OUTPATIENT)
Dept: NURSING | Facility: CLINIC | Age: 85
End: 2020-11-27
Payer: COMMERCIAL

## 2020-11-27 ENCOUNTER — TELEPHONE (OUTPATIENT)
Dept: FAMILY MEDICINE | Facility: CLINIC | Age: 85
End: 2020-11-27

## 2020-11-27 NOTE — PROGRESS NOTES
Clinic Care Coordination Contact  Community Health Worker Initial Outreach    CHW Initial Information Gathering:  Preferred Hospital: [facility]  [phone]  Preferred Urgent Care: Other([place] [facility])  Current living arrangement:: I live alone  Type of residence:: Assisted living  Community Resources: None  Supplies used at home:: None  Equipment Currently Used at Home: walker, rolling(patient uses a raised toilet and a reclined that stands him up.)  Informal Support system:: Children  Transportation means:: Family  CHW Additional Questions  If ED/Hospital discharge, follow-up appointment scheduled as recommended?: N/A  Medication changes made following ED/Hospital discharge?: N/A  MyChart active?: No  Patient agreeable to assistance with activating MyChart?: No    Patient accepts CC: Yes. Patient scheduled for assessment with CC SW on 11-30-20 at 8:45 am. Patient noted desire to discuss CHW spoke with patients daughter she is looking for resources for assisted living resources and transportation resources. She said father can not take care of himself anymore had to have a finger amputated .. You will be speaking with daughter Ngozi [phone]    Sarah HENDERSON Community Health Worker  Clinic Care Coordination  [facility] : [facility] & [facility]  Phone: [phone]    Reason for Referral: pt's daughter requesting help with patient trying to get him into an assisted living facility. Patient is in agreement with referral today

## 2020-11-27 NOTE — TELEPHONE ENCOUNTER
Catia Avila, MARILUZ PRICE  P Ne [employer]             Please call patient to advise that his cultures returned sensitive to Septra which I prescribed for his UTI.  No further action.

## 2020-11-27 NOTE — PROGRESS NOTES
Clinic Care Coordination Contact  [facility]/Voicemail       Clinical Data: CHW Outreach  Outreach attempted x 1. CHW called and spoke with patient. He said he did not want any information about assisted living it must have been his daughter Ngozi.    CHW asked should I call Ngozi he said yes. Patient gave me his daughter Ngozi number to call [phone]. CHW called Ngozi and left voicemail to call CHW back [phone]    Plan: CHW will do no further outreaches at this time.    Sarah HENDERSON Community Health Worker  Clinic Care Coordination  [facility] : [place] [facility]  Phone: [phone]    Reason for Referral: pt's daughter requesting help with patient trying to get him into an assisted living facility. Patient is in agreement with referral today

## 2020-11-27 NOTE — TELEPHONE ENCOUNTER
Patient informed, he is already feeling a bit better.  Advised to take medication till gone.      Lucila Castro RN

## 2020-11-28 LAB
BACTERIA SPEC CULT: ABNORMAL
Lab: ABNORMAL
SPECIMEN SOURCE: ABNORMAL

## 2020-11-30 ENCOUNTER — ANTICOAGULATION THERAPY VISIT (OUTPATIENT)
Dept: FAMILY MEDICINE | Facility: CLINIC | Age: 85
End: 2020-11-30

## 2020-11-30 ENCOUNTER — TELEPHONE (OUTPATIENT)
Dept: FAMILY MEDICINE | Facility: CLINIC | Age: 85
End: 2020-11-30

## 2020-11-30 ENCOUNTER — PATIENT OUTREACH (OUTPATIENT)
Dept: NURSING | Facility: CLINIC | Age: 85
End: 2020-11-30
Payer: COMMERCIAL

## 2020-11-30 DIAGNOSIS — I48.20 CHRONIC ATRIAL FIBRILLATION (H): ICD-10-CM

## 2020-11-30 LAB — INR PPP: 2.4 (ref 0.9–1.1)

## 2020-11-30 SDOH — SOCIAL STABILITY: SOCIAL INSECURITY: WITHIN THE LAST YEAR, HAVE YOU BEEN AFRAID OF YOUR PARTNER OR EX-PARTNER?: NO

## 2020-11-30 SDOH — SOCIAL STABILITY: SOCIAL NETWORK: HOW OFTEN DO YOU GET TOGETHER WITH FRIENDS OR RELATIVES?: MORE THAN THREE TIMES A WEEK

## 2020-11-30 SDOH — SOCIAL STABILITY: SOCIAL INSECURITY
WITHIN THE LAST YEAR, HAVE TO BEEN RAPED OR FORCED TO HAVE ANY KIND OF SEXUAL ACTIVITY BY YOUR PARTNER OR EX-PARTNER?: NO

## 2020-11-30 SDOH — SOCIAL STABILITY: SOCIAL INSECURITY: WITHIN THE LAST YEAR, HAVE YOU BEEN HUMILIATED OR EMOTIONALLY ABUSED IN OTHER WAYS BY YOUR PARTNER OR EX-PARTNER?: NO

## 2020-11-30 SDOH — ECONOMIC STABILITY: TRANSPORTATION INSECURITY
IN THE PAST 12 MONTHS, HAS THE LACK OF TRANSPORTATION KEPT YOU FROM MEDICAL APPOINTMENTS OR FROM GETTING MEDICATIONS?: NO

## 2020-11-30 SDOH — HEALTH STABILITY: PHYSICAL HEALTH: ON AVERAGE, HOW MANY DAYS PER WEEK DO YOU ENGAGE IN MODERATE TO STRENUOUS EXERCISE (LIKE A BRISK WALK)?: 4 DAYS

## 2020-11-30 SDOH — ECONOMIC STABILITY: FOOD INSECURITY: WITHIN THE PAST 12 MONTHS, YOU WORRIED THAT YOUR FOOD WOULD RUN OUT BEFORE YOU GOT THE MONEY TO BUY MORE.: NEVER TRUE

## 2020-11-30 SDOH — SOCIAL STABILITY: SOCIAL INSECURITY
WITHIN THE LAST YEAR, HAVE YOU BEEN RAPED OR FORCED TO HAVE ANY KIND OF SEXUAL ACTIVITY BY YOUR PARTNER OR EX-PARTNER?: NO

## 2020-11-30 SDOH — SOCIAL STABILITY: SOCIAL NETWORK: HOW OFTEN DO YOU ATTEND CHURCH OR RELIGIOUS SERVICES?: NEVER

## 2020-11-30 SDOH — HEALTH STABILITY: MENTAL HEALTH
DO YOU FEEL STRESS - TENSE, RESTLESS, NERVOUS, OR ANXIOUS, OR UNABLE TO SLEEP AT NIGHT BECAUSE YOUR MIND IS TROUBLED ALL THE TIME - THESE DAYS?: ONLY A LITTLE

## 2020-11-30 SDOH — SOCIAL STABILITY: SOCIAL NETWORK
DO YOU BELONG TO ANY CLUBS OR ORGANIZATIONS SUCH AS CHURCH GROUPS UNIONS, FRATERNAL OR ATHLETIC GROUPS, OR SCHOOL GROUPS?: NO

## 2020-11-30 SDOH — ECONOMIC STABILITY: FOOD INSECURITY: WITHIN THE PAST 12 MONTHS, YOU WORRIED THAT YOUR FOOD WOULD RUN OUT BEFORE YOU GOT MONEY TO BUY MORE.: NEVER TRUE

## 2020-11-30 SDOH — SOCIAL STABILITY: SOCIAL NETWORK: HOW OFTEN DO YOU ATTEND MEETINGS OF THE CLUBS OR ORGANIZATIONS YOU BELONG TO?: NEVER

## 2020-11-30 SDOH — SOCIAL STABILITY: SOCIAL INSECURITY
WITHIN THE LAST YEAR, HAVE YOU BEEN KICKED, HIT, SLAPPED, OR OTHERWISE PHYSICALLY HURT BY YOUR PARTNER OR EX-PARTNER?: NO

## 2020-11-30 SDOH — SOCIAL STABILITY: SOCIAL NETWORK
DO YOU BELONG TO ANY CLUBS OR ORGANIZATIONS SUCH AS CHURCH GROUPS, UNIONS, FRATERNAL OR ATHLETIC GROUPS, OR SCHOOL GROUPS?: NO

## 2020-11-30 SDOH — SOCIAL STABILITY: SOCIAL NETWORK
IN A TYPICAL WEEK, HOW MANY TIMES DO YOU TALK ON THE PHONE WITH FAMILY, FRIENDS, OR NEIGHBORS?: MORE THAN THREE TIMES A WEEK

## 2020-11-30 SDOH — SOCIAL STABILITY: SOCIAL INSECURITY: ARE YOU MARRIED, WIDOWED, DIVORCED, SEPARATED, NEVER MARRIED, OR LIVING WITH A PARTNER?: DIVORCED

## 2020-11-30 SDOH — ECONOMIC STABILITY: TRANSPORTATION INSECURITY
IN THE PAST 12 MONTHS, HAS LACK OF TRANSPORTATION KEPT YOU FROM MEETINGS, WORK, OR FROM GETTING THINGS NEEDED FOR DAILY LIVING?: NO

## 2020-11-30 SDOH — ECONOMIC STABILITY: FOOD INSECURITY: HOW HARD IS IT FOR YOU TO PAY FOR THE VERY BASICS LIKE FOOD, HOUSING, MEDICAL CARE, AND HEATING?: NOT HARD AT ALL

## 2020-11-30 SDOH — ECONOMIC STABILITY: INCOME INSECURITY: HOW HARD IS IT FOR YOU TO PAY FOR THE VERY BASICS LIKE FOOD, HOUSING, MEDICAL CARE, AND HEATING?: NOT HARD AT ALL

## 2020-11-30 SDOH — ECONOMIC STABILITY: FOOD INSECURITY: WITHIN THE PAST 12 MONTHS, THE FOOD YOU BOUGHT JUST DIDN'T LAST AND YOU DIDN'T HAVE MONEY TO GET MORE.: NEVER TRUE

## 2020-11-30 SDOH — HEALTH STABILITY: MENTAL HEALTH
STRESS IS WHEN SOMEONE FEELS TENSE, NERVOUS, ANXIOUS, OR CAN'T SLEEP AT NIGHT BECAUSE THEIR MIND IS TROUBLED. HOW STRESSED ARE YOU?: ONLY A LITTLE

## 2020-11-30 SDOH — HEALTH STABILITY: PHYSICAL HEALTH: ON AVERAGE, HOW MANY MINUTES DO YOU ENGAGE IN EXERCISE AT THIS LEVEL?: 20 MIN

## 2020-11-30 SDOH — ECONOMIC STABILITY: TRANSPORTATION INSECURITY: IN THE PAST 12 MONTHS, HAS LACK OF TRANSPORTATION KEPT YOU FROM MEDICAL APPOINTMENTS OR FROM GETTING MEDICATIONS?: NO

## 2020-11-30 SDOH — SOCIAL STABILITY: SOCIAL NETWORK: ARE YOU MARRIED, WIDOWED, DIVORCED, SEPARATED, NEVER MARRIED, OR LIVING WITH A PARTNER?: DIVORCED

## 2020-11-30 SDOH — SOCIAL STABILITY: SOCIAL NETWORK: HOW OFTEN DO YOU ATTENT MEETINGS OF THE CLUB OR ORGANIZATION YOU BELONG TO?: NEVER

## 2020-11-30 ASSESSMENT — ACTIVITIES OF DAILY LIVING (ADL)
LACK_OF_TRANSPORTATION: NO
DEPENDENT_IADLS:: CLEANING;LAUNDRY;SHOPPING;MEDICATION MANAGEMENT;TRANSPORTATION
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
## 2020-11-30 NOTE — TELEPHONE ENCOUNTER
Forms received from [employer]/ Revision to plan of care - SN to do INR 2.1 on 11/19/20 Recheck on 11/30 for Zechariah Shukla MD.  Forms placed in provider 'sign me' folder.  Please fax forms to [phone] after completion.    Rut Cole  Patient Representative

## 2020-11-30 NOTE — PROGRESS NOTES
ANTICOAGULATION MANAGEMENT     Patient Name:  Israel Moyer  Date:  2020    ASSESSMENT /SUBJECTIVE:    Today's INR result of 2.4 is therapeutic. Goal INR of 2.0-3.0      Warfarin dose taken: Warfarin taken as instructed    Diet: No new diet changes affecting INR    Medication changes/ interactions: No new medications/supplements affecting INR    Previous INR: Therapeutic     S/S of bleeding or thromboembolism: No    New injury or illness: No    Upcoming surgery, procedure or cardioversion: No    Additional findings: home care comes out on       PLAN:    Telephone call with home care nurse Melany regarding INR result and instructed:     Warfarin Dosing Instructions: Continue your current warfarin dose 5mg Wednesday & 2.5mg AOD    Instructed patient to follow up no later than: 2 weeks  Orders given to  Homecare nurse/facility to recheck    Education provided: Contact the anticoagulation clinic with any changes, questions or concerns at #[phone]       Melany verbalizes understanding and agrees to warfarin dosing plan.    Instructed to call the Anticoagulation Clinic for any changes, questions or concerns. (#[phone])        Carly Luque RN      OBJECTIVE:  Recent labs: (last 7 days)     20   INR 2.4*         No question data found.  Anticoagulation Summary  As of 2020    INR goal:  2.0-3.0   TTR:  96.2 % (1 y)   INR used for dosin.4 (2020)   Warfarin maintenance plan:  5 mg (5 mg x 1) every Wed; 2.5 mg (2.5 mg x 1) all other days   Full warfarin instructions:  5 mg every Wed; 2.5 mg all other days   Weekly warfarin total:  20 mg   No change documented:  Carly Luque RN   Plan last modified:  Mel Rasmussen RN (2019)   Next INR check:  2020   Priority:  High   Target end date:  2021    Indications    Chronic atrial fibrillation (H) [I48.20]             Anticoagulation Episode Summary     INR check location:      Preferred lab:  EXTERNAL LAB    Send  INR reminders to:  DELIO ISRAEL    Comments:  Home Care      Anticoagulation Care Providers     Provider Role Specialty Phone number    Ry Shukla MD Referring Family Medicine [phone]         Carly Doe RN, BSN, PHN

## 2020-11-30 NOTE — LETTER
[facility]  Complex Care Plan  About Me:    Patient Name:  Israel Moyer    YOB: 1935  Age:         85 year old   Alfonso MRN:    [id number] Telephone Information:  Home Phone [phone]   Mobile [phone]       Address:  [address]  [place] [address] Email address:  [email or web address]      Emergency Contact(s)    Name Relationship Lgl Grd Work Phone Home Phone Mobile Phone   1. GREGORIO STEARNS* Daughter    [phone]   2. KEESHA DELGADO Daughter   [phone]            Primary language:  English     needed? No   [employer]:  [phone] op. 1  Other communication barriers: Visual impairment, Glasses  Preferred Method of Communication:  Mail  Current living arrangement: I live alone  Mobility Status/ Medical Equipment: Independent w/Device    Health Maintenance  Health Maintenance Reviewed: Due/Overdue   Health Maintenance Due   Topic Date Due     ZOSTER IMMUNIZATION (1 of 2) 10/13/1987     ADVANCE CARE PLANNING  09/26/2016     MEDICARE ANNUAL WELLNESS VISIT  05/03/2019     HF ACTION PLAN  04/06/2020     ALT  05/01/2020     LIPID  08/28/2020     INFLUENZA VACCINE (1) 09/01/2020       My Access Plan  Medical Emergency 911   Primary Clinic Line [facility] [phone]   24 Hour Appointment Line [phone] or  [phone]) (toll-free)   24 Hour Nurse Line [phone] (toll-free)   Preferred Urgent Care [facility] [phone]([place] urgent [facility])   [facility]  [phone]   Preferred Pharmacy [facility] [address] [place], [place] [id number] [facility]     Behavioral Health Crisis Line The National Suicide Prevention Lifeline at 1-620.681.8338 or 911             My Care Team Members  Patient Care Team       Relationship Specialty Notifications Start Ry Islas MD Assigned PCP   5/10/15     Phone:  [phone] Fax: [phone]         [address] [facility] [address]    Mitul Barrios MD Assigned Heart and Vascular Provider   11/15/20     Phone: [phone] Fax: [phone] [address] [place] [address]    Renetta Casas BSW Lead Care Coordinator Primary Care - CC Admissions 11/30/20      Care Coordination [facility]    Angela Martinez MA Community Health Worker Primary Care - CC Admissions 11/30/20     Phone: [phone]                 My Care Plans  Self Management and Treatment Plan  Goals and (Comments)  Goals        General    1. Problem Solving (pt-stated)     Notes - Note created  11/30/2020 11:47 AM by Renetta Casas BSW    Goal Statement: Patient's family will research assisted living resources   Date Goal set: 11/30/2020  Barriers: Patient does not wish to move, family report he needs more assistance   Strengths: Patient's family is involved and supportive   Date to Achieve By: 3/30/2021  Patient expressed understanding of goal: Yes  Action steps to achieve this goal:  1. Patient's family will research assisted living facilities   2. Patient's family will discuss options/process with Patient   3. Patient's family will request further resources from /CHW as needed       2. Medical (pt-stated)     Notes - Note created  11/30/2020 11:53 AM by Renetta Casas BSW    Goal Statement: Patient will complete Health Care Directive, with family assist as needed, within 3 months   Date Goal set: 11/30/2020  Barriers: Patient does not have a Health Care Directive, has complex medical status   Strengths: Patient has supportive and involved family   Date to Achieve By: 3/30/2021  Patient expressed understanding of goal: yes  Action steps to achieve this goal:  1. Patient's family will review Health Care Directive   2. Patient's family will assist with completion of Health Care Directive  3. Patient/family will bring to clinic for validation after  completion              Action Plans on File:               Heart Failure       Advance Care Plans/Directives Type: In process        My Medical and Care Information  Problem List   Patient Active Problem List   Diagnosis     ATRIAL FIBRILLATION     Congestive heart failure (H)     Generalized osteoarthrosis, unspecified site     Anemia     Hypothyroidism     Anal fissure     VENTRAL HERNIA     Hyperlipidemia LDL goal <100     CKD (chronic kidney disease) stage 3, GFR 30-59 ml/min     Advanced directives, counseling/discussion     Gout     Hypertension goal BP (blood pressure) < 140/90     Long term current use of anticoagulant therapy     Hypothyroidism due to acquired atrophy of thyroid     Obesity (BMI 35.0-39.9) with comorbidity (H)     Venous stasis dermatitis of both lower extremities     PVD (peripheral vascular disease) (H)     Complete tear of right rotator cuff, unspecified whether traumatic     Chronic atrial fibrillation (H)          Care Coordination Start Date: 11/30/2020   Frequency of Care Coordination: monthly   Form Last Updated: 11/30/2020

## 2020-11-30 NOTE — LETTER
[facility] CARE COORDINATION  No primary provider on file  [facility]  [address] [place] [address]    November 30, 2020    Israel Moyer  [address]   [place] [address]      Dear Israel,    I am a clinic care coordinator who works with [facility].  In discussion with your daughter Ngozi this morning, we discussed the following resource [email or web address]      The clinic care coordination team is made up of a registered nurse,  and community health worker who understand the health care system. The goal of clinic care coordination is to help you manage your health and improve access to the health care system in the most efficient manner. The team can assist you in meeting your health care goals by providing education, coordinating services, strengthening the communication among your providers and supporting you with any resource needs.    Please feel free to contact the Community Health WorkerAngela at [phone] with any questions or concerns. We are focused on providing you with the highest-quality healthcare experience possible and that all starts with you.     Sincerely,     Renetta Casas, PHOEBEW, MSW   [facility]  Care Coordination  [facility], [facility], [facility]  [phone]  11/30/2020 12:29 PM    Enclosed: I have enclosed a copy of the Complex Care Plan. This has helpful information and goals that we have talked about. Please keep this in an easy to access place to use as needed.

## 2020-12-04 ENCOUNTER — TELEPHONE (OUTPATIENT)
Dept: FAMILY MEDICINE | Facility: CLINIC | Age: 85
End: 2020-12-04

## 2020-12-04 NOTE — TELEPHONE ENCOUNTER
Orders are ongoing with no recent status change/hospitalization. Verbal order provided to Melany with [facility] for continuation of home care, per [facility] RN standing orders.     Holly Orantes RN

## 2020-12-04 NOTE — TELEPHONE ENCOUNTER
Reason for Call:  Home Health Care    Melany with [facility] called regarding (reason for call): Orders request    Orders are needed for this patient. Skilled Nursing    PT: No    OT: No    Skilled Nursing: One time a week for nine weeks. Home health aid for showers two times a week for nine weeks.   for three hours a week for nine weeks.    Pt Provider: [place]    Phone Number Homecare Nurse can be reached at: [phone] (Melany lauren/[facility])    Can we leave a detailed message on this number? YES    Phone number patient can be reached at: Home number on file [phone] (home)    Best Time: ASAP    Call taken on 12/4/2020 at 2:58 PM by Lolly Chaudhari

## 2020-12-06 ENCOUNTER — TELEPHONE (OUTPATIENT)
Dept: FAMILY MEDICINE | Facility: CLINIC | Age: 85
End: 2020-12-06

## 2020-12-06 NOTE — TELEPHONE ENCOUNTER
Forms received from [facility]: revisionto plan of care, changed INR recheck date to 11/30 (11/25) for Catia Avila NP.  Forms placed in provider 'sign me' folder.  Please fax forms to [phone] after completion.    Shun De La Garza

## 2020-12-07 ENCOUNTER — TELEPHONE (OUTPATIENT)
Dept: FAMILY MEDICINE | Facility: CLINIC | Age: 85
End: 2020-12-07

## 2020-12-07 NOTE — TELEPHONE ENCOUNTER
Forms received from [facility]/ Revision to plan of care - orders - SN to INR 2.4 on 11/30/20. recheck on 12/17/20  for Patti Avila CNP.  Forms placed in provider 'sign me' folder.  Please fax forms to [phone] after completion.    Rut Cole  Patient Representative

## 2020-12-10 ENCOUNTER — TELEPHONE (OUTPATIENT)
Dept: FAMILY MEDICINE | Facility: CLINIC | Age: 85
End: 2020-12-10

## 2020-12-10 NOTE — TELEPHONE ENCOUNTER
[facility] RN returns call to triage line.     Patient's daughter tested positive today for COVID-19. Patient last had contact with her on Tuesday. [facility] unable to complete this through their agency, is requesting PCP place order through . Patient is not having symptoms currently.    Routing to PCP - willing to place an order for testing?     Please route back to team with response.    Mari Kaur, RN, BSN, PHN  [facility]: [place]

## 2020-12-10 NOTE — TELEPHONE ENCOUNTER
Reason for call:  Other   Patient called regarding (reason for call): call back  Additional comments: Ava from [facility] home care is calling because the patients daughter tested positive for Covid and she wants to know if he should get tested, please call back     Phone number to reach patient:  Other phone number:  [phone]    Best Time:  any    Can we leave a detailed message on this number?  YES    Travel screening: Not Applicable

## 2020-12-10 NOTE — TELEPHONE ENCOUNTER
From my understanding, I believe this should with go to oncare or a telephone visit with provider ? I am happy to double book him at 7:20 tel if this is all we will speak about ?

## 2020-12-10 NOTE — TELEPHONE ENCOUNTER
Attempt #1 to call [facility] RN    Patient did not answer, RN left voicemail at [facility] line number. RN requested patient return call to Nurse Triage line at [phone].     Mari Kaur RN, BSN, PHN  [facility]: [place]

## 2020-12-11 NOTE — TELEPHONE ENCOUNTER
Returned call to Ava to review provider's note below. She states it's difficult for patient to get out of the home to test, so that's why they were calling, to see if patient should test or just quarantine. RN advised it's up to them-if he had a close contact exposure and isn't going out of the home anyway, he can just quarantine per CDC guidelines.  Otherwise, he could have a telephone visit with one of our providers, but being asymptomatic, he may not be able to get tested for a few days, then it will take a couple days to get results.  Also offered other options such as community testing sites, if they should want to go that route.  Understanding voiced, and she's states he'll probably just quarantine.     Holly Orantes RN

## 2020-12-14 ENCOUNTER — TELEPHONE (OUTPATIENT)
Dept: FAMILY MEDICINE | Facility: CLINIC | Age: 85
End: 2020-12-14

## 2020-12-14 NOTE — TELEPHONE ENCOUNTER
Forms received from [facility]/ Home Health Cert (12/09/20-02/06/21)  for Patti Avila CNP.  Forms placed in provider 'sign me' folder.  Please fax forms to [phone] after completion.    Rut Cole  Patient Representative

## 2020-12-15 ENCOUNTER — MEDICAL CORRESPONDENCE (OUTPATIENT)
Dept: HEALTH INFORMATION MANAGEMENT | Facility: CLINIC | Age: 85
End: 2020-12-15

## 2020-12-17 ENCOUNTER — ANTICOAGULATION THERAPY VISIT (OUTPATIENT)
Dept: FAMILY MEDICINE | Facility: CLINIC | Age: 85
End: 2020-12-17

## 2020-12-17 DIAGNOSIS — I48.20 CHRONIC ATRIAL FIBRILLATION (H): ICD-10-CM

## 2020-12-17 LAB — INR PPP: 2 (ref 0.9–1.1)

## 2020-12-17 NOTE — PROGRESS NOTES
ANTICOAGULATION MANAGEMENT     Patient Name:  Israel Moyer  Date:  2020    ASSESSMENT /SUBJECTIVE:    Today's INR result of 2.0 is therapeutic. Goal INR of 2.0-3.0      Warfarin dose taken: Warfarin taken as instructed    Diet: No new diet changes affecting INR    Medication changes/ interactions: No new medications/supplements affecting INR    Previous INR: Therapeutic     S/S of bleeding or thromboembolism: No    New injury or illness: No    Upcoming surgery, procedure or cardioversion: No    Additional findings: None      PLAN:    Telephone call with home care nurse Melany  regarding INR result and instructed:     Warfarin Dosing Instructions: Continue your current warfarin dose 5 mg on wed and 2.5 mg all other days    Instructed patient to follow up no later than: 3 weeks  Orders given to  Homecare nurse/facility to recheck    Education provided: None required      Melany home care nurse, verbalizes understanding and agrees to warfarin dosing plan.    Instructed to call the Anticoagulation Clinic for any changes, questions or concerns. (#[phone])        Maricruz Chao RN      OBJECTIVE:  Recent labs: (last 7 days)     20   INR 2.0*         No question data found.  Anticoagulation Summary  As of 2020    INR goal:  2.0-3.0   TTR:  96.2 % (1 y)   INR used for dosin.0 (2020)   Warfarin maintenance plan:  5 mg (5 mg x 1) every Wed; 2.5 mg (2.5 mg x 1) all other days   Full warfarin instructions:  5 mg every Wed; 2.5 mg all other days   Weekly warfarin total:  20 mg   Plan last modified:  Mel Rasmussen RN (2019)   Next INR check:     Priority:  High   Target end date:  2021    Indications    Chronic atrial fibrillation (H) [I48.20]             Anticoagulation Episode Summary     INR check location:      Preferred lab:  EXTERNAL LAB    Send INR reminders to:  DELIO ISRAEL    Comments:  Home Care      Anticoagulation Care Providers     Provider Role Specialty Phone  number    Ry Shukla MD [facility] [phone]

## 2020-12-18 ENCOUNTER — TELEPHONE (OUTPATIENT)
Dept: FAMILY MEDICINE | Facility: CLINIC | Age: 85
End: 2020-12-18

## 2020-12-18 NOTE — TELEPHONE ENCOUNTER
Forms received from [facility]-Revision To Plan Of Care/Oders- INR 2.0 on 12/17/2020 for Catia Avila NP.  Forms placed in provider 'sign me' folder.  Please fax forms to [phone] after completion.    SHAHIDA STOCKTON

## 2020-12-23 DIAGNOSIS — M1A.09X0 IDIOPATHIC CHRONIC GOUT OF MULTIPLE SITES WITHOUT TOPHUS: ICD-10-CM

## 2020-12-23 DIAGNOSIS — I10 HYPERTENSION GOAL BP (BLOOD PRESSURE) < 140/90: ICD-10-CM

## 2020-12-24 ENCOUNTER — TELEPHONE (OUTPATIENT)
Dept: FAMILY MEDICINE | Facility: CLINIC | Age: 85
End: 2020-12-24

## 2020-12-24 NOTE — TELEPHONE ENCOUNTER
Forms received from [facility]/ Revision To Plan of Care / Orders: MS 1 Visit PRN for 3wks (cert period 4/17/2020 to 6/15/2020), for Catia Avila NP  Forms placed in provider 'sign me' folder.  Please Fax forms to [phone] after completion.    Manny Benson RT (R) (ARRT)  Radiology Dept

## 2020-12-24 NOTE — TELEPHONE ENCOUNTER
Routing refill request to provider for review/approval because:  Labs not current:  ALT, uric acid, SR CR      Pending Prescriptions:                       Disp   Refills    allopurinol (ZYLOPRIM) 100 MG tablet [Phar*180 ta*0        Sig: Take 2 tablets by mouth once daily    furosemide (LASIX) 40 MG tablet [Pharmacy *90 tab*0        Sig: Take 1 tablet by mouth once daily      Lucila Castro RN

## 2020-12-28 RX ORDER — FUROSEMIDE 40 MG
TABLET ORAL
Qty: 90 TABLET | Refills: 0 | Status: SHIPPED | OUTPATIENT
Start: 2020-12-28 | End: 2021-01-22

## 2020-12-28 RX ORDER — ALLOPURINOL 100 MG/1
TABLET ORAL
Qty: 180 TABLET | Refills: 0 | Status: SHIPPED | OUTPATIENT
Start: 2020-12-28 | End: 2021-01-22

## 2020-12-31 ENCOUNTER — PATIENT OUTREACH (OUTPATIENT)
Dept: CARE COORDINATION | Facility: CLINIC | Age: 85
End: 2020-12-31

## 2020-12-31 NOTE — PROGRESS NOTES
Clinic Care Coordination Contact  [facility]/Voicemail       Clinical Data: CHW Outreach  Outreach attempted x 1. CHW spoke with patients daughter Ngozi. CHW asked how dad was doing she said he was doing  great. Ngozi said this was not the best time to talk. She was walking into a business she asked if CHW could call back after the 1st if the year.     CHW said sure CHW will call back next week sometime. Ngozi said that would be fine.    Plan: CHW will try to reach patient again in 5-7 business days.    Sarah HENDERSON Community Health Worker  Clinic Care Coordination  [facility] Clinics : [place], [place] & [place]  Phone: [phone]    Notes:    How is your Dad doing?    Were you able to look into some Assisted living facilities?    Did patient complete HCD?

## 2021-01-07 ENCOUNTER — ANTICOAGULATION THERAPY VISIT (OUTPATIENT)
Dept: FAMILY MEDICINE | Facility: CLINIC | Age: 86
End: 2021-01-07

## 2021-01-07 DIAGNOSIS — I48.20 CHRONIC ATRIAL FIBRILLATION (H): ICD-10-CM

## 2021-01-07 LAB — INR PPP: 2.4 (ref 0.9–1.1)

## 2021-01-07 NOTE — PROGRESS NOTES
ANTICOAGULATION MANAGEMENT     Patient Name:  Israel Moyer  Date:  2021    ASSESSMENT /SUBJECTIVE:    Today's INR result of 2.4 is therapeutic. Goal INR of 2.0-3.0      Warfarin dose taken: Warfarin taken as instructed    Diet: No new diet changes affecting INR    Medication changes/ interactions: No new medications/supplements affecting INR    Previous INR: Therapeutic     S/S of bleeding or thromboembolism: No    New injury or illness: No    Upcoming surgery, procedure or cardioversion: No    Additional findings: None      PLAN:    Telephone call with home care nurse Melany regarding INR result and instructed:     Warfarin Dosing Instructions: Continue your current warfarin dose 5 mg Wed; 2.5 mg all other days    Instructed patient to follow up no later than: 3 weeks  Orders given to  Homecare nurse/facility to recheck    Education provided: Monitoring for bleeding signs and symptoms and Monitoring for clotting signs and symptoms      Melany Nurse verbalizes understanding and agrees to warfarin dosing plan.    Instructed to call the Anticoagulation Clinic for any changes, questions or concerns. (#[phone])        Aranza Hernandes RN      OBJECTIVE:  Recent labs: (last 7 days)     21   INR 2.4*         No question data found.  Anticoagulation Summary  As of 2021    INR goal:  2.0-3.0   TTR:  96.2 % (1 y)   INR used for dosin.4 (2021)   Warfarin maintenance plan:  5 mg (5 mg x 1) every Wed; 2.5 mg (2.5 mg x 1) all other days   Full warfarin instructions:  5 mg every Wed; 2.5 mg all other days   Weekly warfarin total:  20 mg   No change documented:  Aranza Hernandes RN   Plan last modified:  Mel Rasmussen RN (2019)   Next INR check:  2021   Priority:  Maintenance   Target end date:  2021    Indications    Chronic atrial fibrillation (H) [I48.20]             Anticoagulation Episode Summary     INR check location:      Preferred lab:  EXTERNAL LAB    Send INR reminders  to:  DELIO ISRAEL    Comments:  Home Care      Anticoagulation Care Providers     Provider Role Specialty Phone number    Ry Shukla MD Referring Family Medicine [phone]

## 2021-01-12 ENCOUNTER — TELEPHONE (OUTPATIENT)
Dept: FAMILY MEDICINE | Facility: CLINIC | Age: 86
End: 2021-01-12

## 2021-01-12 NOTE — TELEPHONE ENCOUNTER
Forms received from [facility]/ Revision to Plan of Care - Orders SN to INR 2.4 on 1/7/2021 (cert period: 12/9/2020 to 2/6/2021) for Catia Avila CNP.  Forms placed in provider 'sign me' folder.  Please fax forms to [phone] after completion.    Manny Benson RT (R) (ARRT)  Radiology Dept

## 2021-01-13 ENCOUNTER — PATIENT OUTREACH (OUTPATIENT)
Dept: NURSING | Facility: CLINIC | Age: 86
End: 2021-01-13
Payer: COMMERCIAL

## 2021-01-13 ASSESSMENT — ACTIVITIES OF DAILY LIVING (ADL): DEPENDENT_IADLS:: CLEANING;LAUNDRY;SHOPPING;MEDICATION MANAGEMENT;TRANSPORTATION

## 2021-01-13 NOTE — TELEPHONE ENCOUNTER
"Clinic Care Coordination Contact    Follow Up Progress Note      Assessment: Patient's dtr Ngozi reports that Patient is doing \"okay\", they are considering Health Care and Financial Power of  for Patient, this was added as a goal.  Patient's dtr reports that Patient is not accepting after several conversations of assisted living    Goals addressed this encounter:   Goals Addressed                 This Visit's Progress       General      1. Problem Solving (pt-stated)   10%     Goal Statement: Patient's family will research assisted living resources   Date Goal set: 11/30/2020  Barriers: Patient does not wish to move, family report he needs more assistance   Strengths: Patient's family is involved and supportive   Date to Achieve By: 3/30/2021  Patient expressed understanding of goal: Yes  Action steps to achieve this goal:  1. Patient's family will research assisted living facilities   2. Patient's family will discuss options/process with Patient   3. Patient's family will request further resources from SW/CHW as needed         2. Medical (pt-stated)   0%     Goal Statement: Patient will complete Health Care Directive, with family assist as needed, within 3 months   Date Goal set: 11/30/2020  Barriers: Patient does not have a Health Care Directive, has complex medical status   Strengths: Patient has supportive and involved family   Date to Achieve By: 3/30/2021  Patient expressed understanding of goal: yes  Action steps to achieve this goal:  1. Patient's family will review Health Care Directive   2. Patient's family will assist with completion of Health Care Directive  3. Patient/family will bring to clinic for validation after completion         3. Other (pt-stated)        Goal Statement: Patient's family will discuss/create Power of  (POA) for health care, finances with Patient, review within 2 months  Date Goal set: 1/13/2021  Barriers: Patient needs more care than he will accept  Strengths: " Family are very involved, supportive  Date to Achieve By:  Patient expressed understanding of goal: yes  Action steps to achieve this goal:  1. Patient's family will research POA process   2. Patient's family will discuss documents with Patient   3. Patient's family pursue per Patient wishes             Intervention/Education provided during outreach: none       Plan:     CHW will call in 1 month, Care Coordinator will review progress to goals and plan of care in 6 weeks    IKE Daniels, MSW   [facility]  Care Coordination  [facility]  [phone]  1/13/2021 12:55 PM

## 2021-01-14 ENCOUNTER — TELEPHONE (OUTPATIENT)
Dept: FAMILY MEDICINE | Facility: CLINIC | Age: 86
End: 2021-01-14

## 2021-01-14 DIAGNOSIS — I73.9 PAD (PERIPHERAL ARTERY DISEASE) (H): ICD-10-CM

## 2021-01-14 DIAGNOSIS — I77.810 AORTIC ROOT DILATION (H): Primary | ICD-10-CM

## 2021-01-14 DIAGNOSIS — E78.5 HYPERLIPIDEMIA LDL GOAL <70: ICD-10-CM

## 2021-01-14 NOTE — TELEPHONE ENCOUNTER
Forms received from [facility]/ -Orders- weekly orders written for the week ending Saturday (10/10/2020 - 12/8/2020) for Catia Avila CNP.  Forms placed in provider 'sign me' folder.  Please fax forms to [phone] after completion.    Manny STOCKTON (R) (ARRT)  Radiology Dept

## 2021-01-15 RX ORDER — ROSUVASTATIN CALCIUM 40 MG/1
TABLET, COATED ORAL
Qty: 90 TABLET | Refills: 0 | OUTPATIENT
Start: 2021-01-15

## 2021-01-15 NOTE — TELEPHONE ENCOUNTER
rosuvastatin (CRESTOR) 40 MG tablet  Last Written Prescription Date:  10/28/20  Last Fill Quantity: 90,  # refills: 0     Last office visit: 9/4/2019   Follow up: January 2020 for TTE d/t Aortic root dilation 41 mm in size.    Rx request denied - patient needs appointment.    Viry Felton RN BSN  [facility]  [phone]

## 2021-01-21 DIAGNOSIS — Z53.9 DIAGNOSIS NOT YET DEFINED: Primary | ICD-10-CM

## 2021-01-21 PROCEDURE — G0179 MD RECERTIFICATION HHA PT: HCPCS | Performed by: NURSE PRACTITIONER

## 2021-01-22 ENCOUNTER — OFFICE VISIT (OUTPATIENT)
Dept: FAMILY MEDICINE | Facility: CLINIC | Age: 86
End: 2021-01-22
Payer: COMMERCIAL

## 2021-01-22 VITALS
BODY MASS INDEX: 32.86 KG/M2 | WEIGHT: 229 LBS | OXYGEN SATURATION: 100 % | SYSTOLIC BLOOD PRESSURE: 138 MMHG | HEART RATE: 63 BPM | DIASTOLIC BLOOD PRESSURE: 78 MMHG | RESPIRATION RATE: 16 BRPM

## 2021-01-22 DIAGNOSIS — E78.5 HYPERLIPIDEMIA LDL GOAL <70: ICD-10-CM

## 2021-01-22 DIAGNOSIS — N18.30 STAGE 3 CHRONIC KIDNEY DISEASE, UNSPECIFIED WHETHER STAGE 3A OR 3B CKD (H): ICD-10-CM

## 2021-01-22 DIAGNOSIS — E03.4 HYPOTHYROIDISM DUE TO ACQUIRED ATROPHY OF THYROID: ICD-10-CM

## 2021-01-22 DIAGNOSIS — I50.9 CONGESTIVE HEART FAILURE, UNSPECIFIED HF CHRONICITY, UNSPECIFIED HEART FAILURE TYPE (H): ICD-10-CM

## 2021-01-22 DIAGNOSIS — I48.20 CHRONIC ATRIAL FIBRILLATION (H): ICD-10-CM

## 2021-01-22 DIAGNOSIS — I10 HYPERTENSION GOAL BP (BLOOD PRESSURE) < 140/90: ICD-10-CM

## 2021-01-22 DIAGNOSIS — I73.9 PAD (PERIPHERAL ARTERY DISEASE) (H): ICD-10-CM

## 2021-01-22 DIAGNOSIS — Z00.00 ENCOUNTER FOR MEDICARE ANNUAL WELLNESS EXAM: Primary | ICD-10-CM

## 2021-01-22 DIAGNOSIS — M1A.09X0 IDIOPATHIC CHRONIC GOUT OF MULTIPLE SITES WITHOUT TOPHUS: ICD-10-CM

## 2021-01-22 PROCEDURE — 99397 PER PM REEVAL EST PAT 65+ YR: CPT | Performed by: FAMILY MEDICINE

## 2021-01-22 PROCEDURE — 80048 BASIC METABOLIC PNL TOTAL CA: CPT | Performed by: FAMILY MEDICINE

## 2021-01-22 PROCEDURE — 80061 LIPID PANEL: CPT | Performed by: FAMILY MEDICINE

## 2021-01-22 PROCEDURE — 84439 ASSAY OF FREE THYROXINE: CPT | Performed by: FAMILY MEDICINE

## 2021-01-22 PROCEDURE — 84550 ASSAY OF BLOOD/URIC ACID: CPT | Performed by: FAMILY MEDICINE

## 2021-01-22 PROCEDURE — 84460 ALANINE AMINO (ALT) (SGPT): CPT | Performed by: FAMILY MEDICINE

## 2021-01-22 PROCEDURE — 36415 COLL VENOUS BLD VENIPUNCTURE: CPT | Performed by: FAMILY MEDICINE

## 2021-01-22 PROCEDURE — 84443 ASSAY THYROID STIM HORMONE: CPT | Performed by: FAMILY MEDICINE

## 2021-01-22 RX ORDER — ROSUVASTATIN CALCIUM 40 MG/1
40 TABLET, COATED ORAL DAILY
Qty: 90 TABLET | Refills: 3 | Status: SHIPPED | OUTPATIENT
Start: 2021-01-22

## 2021-01-22 RX ORDER — LISINOPRIL 10 MG/1
10 TABLET ORAL DAILY
Qty: 90 TABLET | Refills: 1 | Status: SHIPPED | OUTPATIENT
Start: 2021-01-22

## 2021-01-22 RX ORDER — LEVOTHYROXINE SODIUM 50 UG/1
50 TABLET ORAL DAILY
Qty: 90 TABLET | Refills: 3 | Status: SHIPPED | OUTPATIENT
Start: 2021-01-22

## 2021-01-22 RX ORDER — FUROSEMIDE 40 MG
40 TABLET ORAL DAILY
Qty: 90 TABLET | Refills: 1 | Status: SHIPPED | OUTPATIENT
Start: 2021-01-22 | End: 2021-07-19

## 2021-01-22 RX ORDER — METOPROLOL SUCCINATE 100 MG/1
100 TABLET, EXTENDED RELEASE ORAL DAILY
Qty: 90 TABLET | Refills: 1 | Status: SHIPPED | OUTPATIENT
Start: 2021-01-22 | End: 2021-10-18

## 2021-01-22 RX ORDER — ALLOPURINOL 100 MG/1
200 TABLET ORAL DAILY
Qty: 180 TABLET | Refills: 3 | Status: SHIPPED | OUTPATIENT
Start: 2021-01-22

## 2021-01-22 ASSESSMENT — ENCOUNTER SYMPTOMS
EYE PAIN: 0
PARESTHESIAS: 0
HEMATOCHEZIA: 0
CONSTIPATION: 0
JOINT SWELLING: 1
FREQUENCY: 1
SORE THROAT: 0
NAUSEA: 0
WEAKNESS: 1
CHILLS: 0
DIZZINESS: 0
HEADACHES: 0
ARTHRALGIAS: 1
FEVER: 0
DIARRHEA: 0
PALPITATIONS: 0
DYSURIA: 0
SHORTNESS OF BREATH: 0
COUGH: 0
NERVOUS/ANXIOUS: 0
ABDOMINAL PAIN: 0
HEARTBURN: 0
HEMATURIA: 0
MYALGIAS: 1

## 2021-01-22 ASSESSMENT — ACTIVITIES OF DAILY LIVING (ADL)
CURRENT_FUNCTION: BATHING REQUIRES ASSISTANCE
CURRENT_FUNCTION: TRANSPORTATION REQUIRES ASSISTANCE
CURRENT_FUNCTION: LAUNDRY REQUIRES ASSISTANCE
CURRENT_FUNCTION: SHOPPING REQUIRES ASSISTANCE
CURRENT_FUNCTION: HOUSEWORK REQUIRES ASSISTANCE

## 2021-01-22 NOTE — PATIENT INSTRUCTIONS
Patient Education   Personalized Prevention Plan  You are due for the preventive services outlined below.  Your care team is available to assist you in scheduling these services.  If you have already completed any of these items, please share that information with your care team to update in your medical record.  Health Maintenance Due   Topic Date Due     Zoster (Shingles) Vaccine (1 of 2) 10/13/1987     Discuss Advance Care Planning  09/26/2016     Annual Wellness Visit  05/03/2019     Heart Failure Action Plan  04/06/2020     Liver Monitoring Lab  05/01/2020     Cholesterol Lab  08/28/2020     Flu Vaccine (1) 09/01/2020       [facility]   Discharged by : Ingrid Thomas CMA  If you have any questions regarding to your visit please contact your care team:     Team Silver              Clinic Hours Telephone Number     Dr. Mary Beth Lyman CNP     7am-7pm  Monday - Thursday   7am-5pm  Fridays  [phone]   (Appointment scheduling available 24/7)     RN Line  [phone] option 2     Urgent Care - [place] [facility] - 11am-9pm Monday-Friday Saturday-Sunday- 9am-5pm     [place] -   5pm-9pm Monday-Friday Saturday-Sunday- 9am-5pm    [phone] - [place]    [phone] - [place]     For a Price Quote for your services, please call our Consumer Price Line at [phone].     What options do I have for visits at the clinic other than the traditional office visit?     To expand how we care for you, many of our providers are utilizing electronic visits (e-visits) and telephone visits, when medically appropriate, for interactions with their patients rather than a visit in the clinic. We also offer nurse visits for many medical concerns. Just like any other service, we will bill your insurance company for this type of visit based on time spent on the phone with your provider. Not all insurance companies cover these visits.  Please check with your medical insurance if this type of visit is covered. You will be responsible for any charges that are not paid by your insurance.   E-visits via Combined Efforthart: generally incur a $45.00 fee.     Telephone visits:  Time spent on the phone: *charged based on time that is spent on the phone in increments of 10 minutes. Estimated cost:   5-10 mins $30.00   11-20 mins. $59.00   21-30 mins. $85.00       Use Visualtising (secure email communication and access to your chart) to send your primary care provider a message or make an appointment. Ask someone on your Team how to sign up for Visualtising.   As always, Thank you for trusting us with your health care needs!    [facility]:    Scheduling Appointments  [place]  Call: [phone]    [facility]  Call: [phone]    [facility]  Call: [phone]    For Gastroenterology referrals   [facility]   Clinics and Surgery Center, 4th Floor   [address] [place] [id number]   Appointments: [phone]    WHERE TO GO FOR CARE?  Clinic    Make an appointment if you:       Are sick (cold, cough, flu, sore throat, earache or in pain).       Have a small injury (sprain, small cut, burn or broken bone).       Need a physical exam, Pap smear, vaccine or prescription refill.       Have questions about your health or medicines.    To reach us:      Call [phone] ([phone]). Open 24 hours every day. (For counseling services, call [phone].)    Log into Visualtising at [email or web address]. (Visit [email or web address] to create an account.) Hospital emergency room    An emergency is a serious or life- threatening problem that must be treated right away.    Call 625 or get to the hospital if you have:      Very bad or sudden:            - Chest pain or pressure         - Bleeding         - Head or belly pain         - Dizziness or trouble seeing,  walking or                          Speaking      Problems breathing      Blood in your vomit or you are coughing up blood      A major injury (knocked out, loss of a finger or limb, rape, broken bone protruding from skin)    A mental health crisis. (Or call the Mental Health Crisis line at 1-833.517.1848 or Suicide Prevention Hotline at 1-161.166.4422.)    Open 24 hours every day. You don't need an appointment.     Urgent care    Visit urgent care for sickness or small injuries when the clinic is closed. You don't need an appointment. To check hours or find an urgent care near you, visit [email or web address]. Online care    Get online care from [facility] for more than 70 common problems, like colds, allergies and infections. Open 24 hours every day at:   [email or web address]   Need help deciding?    For advice about where to be seen, you may call your clinic and ask to speak with a nurse. We're here for you 24 hours every day.         If you are deaf or hard of hearing, please let us know. We provide many free services including sign language interpreters, oral interpreters, TTYs, telephone amplifiers, note takers and written materials.

## 2021-01-22 NOTE — LETTER
January 25, 2021      [place] [facility]  [address]   [place] [address]        Dear ,    Your test results fall within the expected range(s) or remain unchanged from previous results.  Please continue with current treatment plan.    If you have any questions or concerns, please call the clinic at the number listed above.       Sincerely,      Mary Beth Hoyt D.O./pv      Resulted Orders   ALT   Result Value Ref Range    ALT 28 0 - 70 U/L   Lipid panel reflex to direct LDL Fasting   Result Value Ref Range    Cholesterol 138 <200 mg/dL    Triglycerides 121 <150 mg/dL    HDL Cholesterol 51 >39 mg/dL    LDL Cholesterol Calculated 63 <100 mg/dL      Comment:      Desirable:       <100 mg/dl    Non HDL Cholesterol 87 <130 mg/dL   TSH with free T4 reflex   Result Value Ref Range    TSH 4.02 (H) 0.40 - 4.00 mU/L   Basic metabolic panel  (Ca, Cl, CO2, Creat, Gluc, K, Na, BUN)   Result Value Ref Range    Sodium 138 133 - 144 mmol/L    Potassium 4.4 3.4 - 5.3 mmol/L    Chloride 103 94 - 109 mmol/L    Carbon Dioxide 30 20 - 32 mmol/L    Anion Gap 5 3 - 14 mmol/L    Glucose 106 (H) 70 - 99 mg/dL    Urea Nitrogen 25 7 - 30 mg/dL    Creatinine 1.42 (H) 0.66 - 1.25 mg/dL    GFR Estimate 45 (L) >60 mL/min/[1.73_m2]      Comment:      Non  GFR Calc  Starting 12/18/2018, serum creatinine based estimated GFR (eGFR) will be   calculated using the Chronic Kidney Disease Epidemiology Collaboration   (CKD-EPI) equation.      GFR Estimate If Black 52 (L) >60 mL/min/[1.73_m2]      Comment:       GFR Calc  Starting 12/18/2018, serum creatinine based estimated GFR (eGFR) will be   calculated using the Chronic Kidney Disease Epidemiology Collaboration   (CKD-EPI) equation.      Calcium 9.6 8.5 - 10.1 mg/dL   Uric acid   Result Value Ref Range    Uric Acid 6.6 3.5 - 7.2 mg/dL   T4 free   Result Value Ref Range    T4 Free 0.85 0.76 - 1.46 ng/dL

## 2021-01-22 NOTE — PROGRESS NOTES
"SUBJECTIVE:   Israel Moyer is a 85 year old male who presents for Preventive Visit.    Patient has been advised of split billing requirements and indicates understanding: Yes   Are you in the first 12 months of your Medicare coverage?  No    Healthy Habits:     In general, how would you rate your overall health?  Good    Frequency of exercise:  6-7 days/week    Duration of exercise:  Other    Do you usually eat at least 4 servings of fruit and vegetables a day, include whole grains    & fiber and avoid regularly eating high fat or \"junk\" foods?  Yes    Taking medications regularly:  Yes    Medication side effects:  None    Ability to successfully perform activities of daily living:  Transportation requires assistance, shopping requires assistance, housework requires assistance, bathing requires assistance and laundry requires assistance    Home Safety:  No safety concerns identified    Hearing Impairment:  No hearing concerns    In the past 6 months, have you been bothered by leaking of urine? Yes    In general, how would you rate your overall mental or emotional health?  Good      PHQ-2 Total Score: 0    Additional concerns today:  Yes    This is a new patient to me today his prior physician Dr. Shukla retired.    Do you feel safe in your environment? YES    Have you ever done Advance Care Planning? (For example, a Health Directive, POLST, or a discussion with a medical provider or your loved ones about your wishes): No, advance care planning information given to patient to review.  Patient plans to discuss their wishes with loved ones or provider.        Fall risk  Fallen 2 or more times in the past year?: Yes  Any fall with injury in the past year?: Yes  Timed Up and Go Test (>13.5 is fall risk; contact physician) : 13    Cognitive Screening   1) Repeat 3 items (Leader, Season, Table)    2) Clock draw: NORMAL  3) 3 item recall: Recalls 2 objects   Results: NORMAL clock, 1-2 items recalled: COGNITIVE IMPAIRMENT " LESS LIKELY    Mini-CogTM Copyright JOSSIE Martinez. Licensed by the author for use in [facility]; reprinted with permission (ora@.[facility]). All rights reserved.      Do you have sleep apnea, excessive snoring or daytime drowsiness?: no    Reviewed and updated as needed this visit by clinical staff   Allergies               Reviewed and updated as needed this visit by Provider                Social History     Tobacco Use     Smoking status: Never Smoker     Smokeless tobacco: Never Used   Substance Use Topics     Alcohol use: Yes     Comment: not much     If you drink alcohol do you typically have >3 drinks per day or >7 drinks per week? No    Alcohol Use 1/22/2021   Prescreen: >3 drinks/day or >7 drinks/week? No   Prescreen: >3 drinks/day or >7 drinks/week? -   No flowsheet data found.        Hyperlipidemia Follow-Up      Are you regularly taking any medication or supplement to lower your cholesterol?   Yes- Crestor    Are you having muscle aches or other side effects that you think could be caused by your cholesterol lowering medication?  No     Crestor 40 mg daily    Hypertension Follow-up      Do you check your blood pressure regularly outside of the clinic? No     Are you following a low salt diet? Yes    Are your blood pressures ever more than 140 on the top number (systolic) OR more   than 90 on the bottom number (diastolic), for example 140/90? No   Lisinopril 10 mg daily and metoprolol 100 mg daily    Hypothyroidism Follow-up      Since last visit, patient describes the following symptoms: Weight stable, no hair loss, no skin changes, no constipation, no loose stools  TSH   Date Value Ref Range Status   05/01/2019 4.98 (H) 0.40 - 4.00 mU/L Final       Synthroid 50 mcg daily    The patient has congestive heart failure and atrial fibrillation also.  He is on Coumadin for anticoagulation and metoprolol for rate control.  He takes Lasix 40 mg daily for peripheral edema.    Patient is also on allopurinol  for gout.  This is well controlled      Current providers sharing in care for this patient include:   Patient Care Team:  Mary Beth Hoyt DO as PCP - General (Family Medicine)  Catia Avila APRN CNP as Assigned PCP    The following health maintenance items are reviewed in Epic and correct as of today:  Health Maintenance   Topic Date Due     ZOSTER IMMUNIZATION (1 of 2) 10/13/1987     HF ACTION PLAN  04/06/2020     ALT  05/01/2020     INFLUENZA VACCINE (1) 09/01/2020     BMP  07/22/2021     MICROALBUMIN  11/25/2021     CBC  11/25/2021     FALL RISK ASSESSMENT  11/30/2021     MEDICARE ANNUAL WELLNESS VISIT  01/22/2022     LIPID  01/22/2022     ADVANCE CARE PLANNING  01/22/2026     DTAP/TDAP/TD IMMUNIZATION (3 - Td) 04/06/2027     TSH W/FREE T4 REFLEX  Completed     PHQ-2  Completed     Pneumococcal Vaccine: 65+ Years  Completed     Pneumococcal Vaccine: Pediatrics (0 to 5 Years) and At-Risk Patients (6 to 64 Years)  Aged Out     IPV IMMUNIZATION  Aged Out     MENINGITIS IMMUNIZATION  Aged Out     HEPATITIS B IMMUNIZATION  Aged Out     BP Readings from Last 3 Encounters:   01/22/21 138/78   11/25/20 109/72   12/23/19 106/62    Wt Readings from Last 3 Encounters:   01/22/21 103.9 kg (229 lb)   11/25/20 102.1 kg (225 lb)   12/23/19 98.9 kg (218 lb)           Review of Systems   Constitutional: Negative for chills and fever.   HENT: Negative for congestion, ear pain, hearing loss and sore throat.    Eyes: Negative for pain and visual disturbance.   Respiratory: Negative for cough and shortness of breath.    Cardiovascular: Positive for peripheral edema. Negative for chest pain and palpitations.   Gastrointestinal: Negative for abdominal pain, constipation, diarrhea, heartburn, hematochezia and nausea.   Genitourinary: Positive for frequency, impotence and urgency. Negative for discharge, dysuria, genital sores and hematuria.   Musculoskeletal: Positive for arthralgias, joint swelling and myalgias.   Skin:  "Negative for rash.   Neurological: Positive for weakness. Negative for dizziness, headaches and paresthesias.   Psychiatric/Behavioral: Negative for mood changes. The patient is not nervous/anxious.      OBJECTIVE:   /78 (BP Location: Right arm)   Pulse 63   Resp 16   Wt 103.9 kg (229 lb)   SpO2 100%   BMI 32.86 kg/m   Estimated body mass index is 32.86 kg/m  as calculated from the following:    Height as of 11/25/20: 1.778 m (5' 10\").    Weight as of this encounter: 103.9 kg (229 lb).  Physical Exam  GENERAL: alert, no distress, frail and elderly  EYES: Eyes grossly normal to inspection, PERRL and conjunctivae and sclerae normal  HENT: ear canals and TM's normal, nose and mouth without ulcers or lesions  NECK: no adenopathy, no asymmetry, masses, or scars and thyroid normal to palpation  RESP: lungs clear to auscultation - no rales, rhonchi or wheezes  CV: regular rate and rhythm, normal S1 S2, no S3 or S4, no murmur, click or rub, no peripheral edema and peripheral pulses strong  ABDOMEN: soft, nontender, no hepatosplenomegaly, no masses and bowel sounds normal  MS: 2+ edema to shins bilaterally  SKIN: hair loss, shiny, thickened, and mildly hyperpigmented skin of the lower extremities bilaterally with excoriations  NEURO: Normal strength and tone, mentation intact and speech normal  PSYCH: mentation appears normal, affect normal/bright    Diagnostic Test Results:  Labs reviewed in Epic  No results found for this or any previous visit (from the past 24 hour(s)).    ASSESSMENT / PLAN:   (Z00.00) Encounter for Medicare annual wellness exam  (primary encounter diagnosis)  Comment:   Plan:    (I73.9) PAD (peripheral artery disease) (H)  Comment: The patient sees Dr. Barrios for vascular surgery  Plan: rosuvastatin (CRESTOR) 40 MG tablet            (E78.5) Hyperlipidemia LDL goal <70  Comment: The current medical regimen is effective;  continue present plan and medications.  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  Plan: TSH with free T4 reflex, levothyroxine         (SYNTHROID/LEVOTHROID) 50 MCG tablet            (I10) Hypertension goal BP (blood pressure) < 140/90  Comment: The current medical regimen is effective;  continue present plan and medications.  Plan: Basic metabolic panel  (Ca, Cl, CO2, Creat,         Gluc, K, Na, BUN), furosemide (LASIX) 40 MG         tablet, lisinopril (ZESTRIL) 10 MG tablet,         metoprolol succinate ER (TOPROL-XL) 100 MG 24         hr tablet            (I50.9) Congestive heart failure, unspecified HF chronicity, unspecified heart failure type (H)  Comment: Patient does not have a cardiologist.  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    Plan: Uric acid, allopurinol (ZYLOPRIM) 100 MG tablet          COUNSELING:  Reviewed preventive health counseling, as reflected in patient instructions       Regular exercise    Estimated body mass index is 32.86 kg/m  as calculated from the following:    Height as of 11/25/20: 1.778 m (5' 10\").    Weight as of this encounter: 103.9 kg (229 lb).        He reports that he has never smoked. He has never used smokeless " tobacco.      Appropriate preventive services were discussed with this patient, including applicable screening as appropriate for cardiovascular disease, diabetes, osteopenia/osteoporosis, and glaucoma.  As appropriate for age/gender, discussed screening for colorectal cancer, prostate cancer, breast cancer, and cervical cancer. Checklist reviewing preventive services available has been given to the patient.    Reviewed patients plan of care and provided an AVS. The Basic Care Plan (routine screening as documented in Health Maintenance) for [place] meets the Care Plan requirement. This Care Plan has been established and reviewed with the Patient and daughter.    Counseling Resources:  ATP IV Guidelines  Pooled Cohorts Equation Calculator  Breast Cancer Risk Calculator  Breast Cancer: Medication to Reduce Risk  FRAX Risk Assessment  ICSI Preventive Guidelines  Dietary Guidelines for Americans, 2010  USDA's MyPlate  ASA Prophylaxis  Lung CA Screening    DO VILMA [facility]    Identified Health Risks:

## 2021-01-23 LAB
ALT SERPL W P-5'-P-CCNC: 28 U/L (ref 0–70)
ANION GAP SERPL CALCULATED.3IONS-SCNC: 5 MMOL/L (ref 3–14)
BUN SERPL-MCNC: 25 MG/DL (ref 7–30)
CALCIUM SERPL-MCNC: 9.6 MG/DL (ref 8.5–10.1)
CHLORIDE SERPL-SCNC: 103 MMOL/L (ref 94–109)
CHOLEST SERPL-MCNC: 138 MG/DL
CO2 SERPL-SCNC: 30 MMOL/L (ref 20–32)
CREAT SERPL-MCNC: 1.42 MG/DL (ref 0.66–1.25)
GFR SERPL CREATININE-BSD FRML MDRD: 45 ML/MIN/{1.73_M2}
GLUCOSE SERPL-MCNC: 106 MG/DL (ref 70–99)
HDLC SERPL-MCNC: 51 MG/DL
LDLC SERPL CALC-MCNC: 63 MG/DL
NONHDLC SERPL-MCNC: 87 MG/DL
POTASSIUM SERPL-SCNC: 4.4 MMOL/L (ref 3.4–5.3)
SODIUM SERPL-SCNC: 138 MMOL/L (ref 133–144)
T4 FREE SERPL-MCNC: 0.85 NG/DL (ref 0.76–1.46)
TRIGL SERPL-MCNC: 121 MG/DL
TSH SERPL DL<=0.005 MIU/L-ACNC: 4.02 MU/L (ref 0.4–4)
URATE SERPL-MCNC: 6.6 MG/DL (ref 3.5–7.2)

## 2021-01-28 ENCOUNTER — ANTICOAGULATION THERAPY VISIT (OUTPATIENT)
Dept: FAMILY MEDICINE | Facility: CLINIC | Age: 86
End: 2021-01-28

## 2021-01-28 DIAGNOSIS — I48.20 CHRONIC ATRIAL FIBRILLATION (H): ICD-10-CM

## 2021-01-28 LAB — INR PPP: 2.8 (ref 0.9–1.1)

## 2021-01-28 NOTE — PROGRESS NOTES
ANTICOAGULATION MANAGEMENT     Patient Name:  Israel Moyer  Date:  2021    ASSESSMENT /SUBJECTIVE:    Today's INR result of 2.8 is therapeutic. Goal INR of 2.0-3.0      Warfarin dose taken: Warfarin taken as instructed    Diet: No new diet changes affecting INR    Medication changes/ interactions: No new medications/supplements affecting INR    Previous INR: Therapeutic     S/S of bleeding or thromboembolism: No    New injury or illness: No    Upcoming surgery, procedure or cardioversion: no    Additional findings: None      PLAN:    Telephone call with home care nurse Marilee regarding INR result and instructed:     Warfarin Dosing Instructions: Continue your current warfarin dose 5 mg on wed and 2.5 mg all ohter days    Instructed patient to follow up no later than: 4 weeks  Orders given to  Homecare nurse/facility to recheck    Education provided: None required      Marilee, home care, verbalizes understanding and agrees to warfarin dosing plan.    Instructed to call the Anticoagulation Clinic for any changes, questions or concerns. (#[phone])        Maricruz Chao RN      OBJECTIVE:  Recent labs: (last 7 days)     21   INR 2.8*         No question data found.  Anticoagulation Summary  As of 2021    INR goal:  2.0-3.0   TTR:  96.2 % (1 y)   INR used for dosin.8 (2021)   Warfarin maintenance plan:  5 mg (5 mg x 1) every Wed; 2.5 mg (2.5 mg x 1) all other days   Full warfarin instructions:  5 mg every Wed; 2.5 mg all other days   Weekly warfarin total:  20 mg   Plan last modified:  Mel Rasmussen RN (2019)   Next INR check:     Priority:  Maintenance   Target end date:  2021    Indications    Chronic atrial fibrillation (H) [I48.20]             Anticoagulation Episode Summary     INR check location:      Preferred lab:  EXTERNAL LAB    Send INR reminders to:  DELIO ISRAEL    Comments:  Home Care      Anticoagulation Care Providers     Provider Role Specialty Phone  number    Ry Shukla MD [facility] [phone]

## 2021-01-29 ENCOUNTER — TELEPHONE (OUTPATIENT)
Dept: FAMILY MEDICINE | Facility: CLINIC | Age: 86
End: 2021-01-29

## 2021-01-29 NOTE — TELEPHONE ENCOUNTER
Forms received from [facility]/Revision to Plan of Care/Cert. Period 12/9/2020-2/6/2021 for Mary Beth Hoyt DO.  Forms placed in provider 'sign me' folder.  Please fax forms to [phone] after completion.    SHAHIDA BLAS (R)  Radiology Department

## 2021-02-02 ENCOUNTER — NURSE TRIAGE (OUTPATIENT)
Dept: NURSING | Facility: CLINIC | Age: 86
End: 2021-02-02

## 2021-02-02 NOTE — TELEPHONE ENCOUNTER
[facility] calling. Received orders for home care faxed back and was signed by Dr. Hoyt.  [facility] is just confirming that Dr. Hoyt is patient's PCP.   RN advised that per file that Dr. Hoyt is listed as PCP.  They will update this information.     Jinny Vaughn RN 02/02/21 11:58 AM  [facility] Nurse Advisor

## 2021-02-04 ENCOUNTER — TELEPHONE (OUTPATIENT)
Dept: FAMILY MEDICINE | Facility: CLINIC | Age: 86
End: 2021-02-04

## 2021-02-04 NOTE — TELEPHONE ENCOUNTER
Reason for Call: Request for an order or referral:    Order or referral being requested: verbal order for home care re-certification: Skilled nurse 1 week 9, home health aid 2 week 9, and  3 hrs week 9    Date needed: as soon as possible    Has the patient been seen by the PCP for this problem? YES    Additional comments: please call Melany with home care with a verbal order    Phone number Patient can be reached at:  Other phone number:  [phone] (Melany, home care)*    Best Time:  anytime    Can we leave a detailed message on this number?  YES    Call taken on 2/4/2021 at 3:54 PM by Shun De La Garza

## 2021-02-04 NOTE — TELEPHONE ENCOUNTER
RN left VM for [facility] , giving verbal orders for home care recertification    Ga Davison, RN, BSN, PHN  [facility]

## 2021-02-05 ENCOUNTER — ANCILLARY PROCEDURE (OUTPATIENT)
Dept: CARDIOLOGY | Facility: CLINIC | Age: 86
End: 2021-02-05
Attending: FAMILY MEDICINE
Payer: COMMERCIAL

## 2021-02-05 DIAGNOSIS — I50.9 CONGESTIVE HEART FAILURE, UNSPECIFIED HF CHRONICITY, UNSPECIFIED HEART FAILURE TYPE (H): ICD-10-CM

## 2021-02-05 PROCEDURE — 93306 TTE W/DOPPLER COMPLETE: CPT | Performed by: INTERNAL MEDICINE

## 2021-02-07 ENCOUNTER — HEALTH MAINTENANCE LETTER (OUTPATIENT)
Age: 86
End: 2021-02-07

## 2021-02-09 ENCOUNTER — IMMUNIZATION (OUTPATIENT)
Dept: NURSING | Facility: CLINIC | Age: 86
End: 2021-02-09
Payer: COMMERCIAL

## 2021-02-09 PROCEDURE — 0001A PR COVID VAC PFIZER DIL RECON 30 MCG/0.3 ML IM: CPT

## 2021-02-09 PROCEDURE — 91300 PR COVID VAC PFIZER DIL RECON 30 MCG/0.3 ML IM: CPT

## 2021-02-10 ENCOUNTER — TELEPHONE (OUTPATIENT)
Dept: FAMILY MEDICINE | Facility: CLINIC | Age: 86
End: 2021-02-10

## 2021-02-10 NOTE — TELEPHONE ENCOUNTER
Forms received from [facility]/ [facility] and Plan of Care, Revision to Plan of Care, Addendum to Plan of Care - (cert period: 2/7/21 to 4/7/21) for Mary Beth Hoyt DO.  Forms placed in provider 'sign me' folder.  Please fax forms to [phone] after completion.    Manny Benson RT (R) (ARRT)  Radiology Dept

## 2021-02-12 ENCOUNTER — MEDICAL CORRESPONDENCE (OUTPATIENT)
Dept: HEALTH INFORMATION MANAGEMENT | Facility: CLINIC | Age: 86
End: 2021-02-12

## 2021-02-12 DIAGNOSIS — Z53.9 DIAGNOSIS NOT YET DEFINED: Primary | ICD-10-CM

## 2021-02-12 PROCEDURE — G0179 MD RECERTIFICATION HHA PT: HCPCS | Performed by: FAMILY MEDICINE

## 2021-02-18 ENCOUNTER — TELEPHONE (OUTPATIENT)
Dept: FAMILY MEDICINE | Facility: CLINIC | Age: 86
End: 2021-02-18

## 2021-02-18 NOTE — TELEPHONE ENCOUNTER
TRISTEN Hunt- [facility]  Calling from [facility]    Reporting that patient has a new sore on Right anterior fore arm.   Patient reported he rubbed it, appears rubbed top layer of skin off.    Current wound care:  Saline to wash, cover with non adherent pad, and coban.      Edges are healing well, they will continue to see him weekly for med set up.   Educated to clean daily  and cover, he will call the clinic if the wound worsens/signs of infection.      Lucila Castro RN

## 2021-02-23 ENCOUNTER — TELEPHONE (OUTPATIENT)
Dept: FAMILY MEDICINE | Facility: CLINIC | Age: 86
End: 2021-02-23

## 2021-02-23 NOTE — TELEPHONE ENCOUNTER
Forms received from [employer]/ Revision to Plan of Care (cert period 2/7/2021 to 4/7/2021) for Mary Beth Hoyt DO.  Forms placed in provider 'sign me' folder.  Please fax forms to [phone] after completion.    Manny Benson RT (R) (ARRT)  Radiology Dept

## 2021-02-24 ENCOUNTER — ANTICOAGULATION THERAPY VISIT (OUTPATIENT)
Dept: FAMILY MEDICINE | Facility: CLINIC | Age: 86
End: 2021-02-24

## 2021-02-24 DIAGNOSIS — I48.20 CHRONIC ATRIAL FIBRILLATION (H): ICD-10-CM

## 2021-02-24 LAB — INR PPP: 1.6 (ref 0.9–1.1)

## 2021-02-24 NOTE — PROGRESS NOTES
ANTICOAGULATION MANAGEMENT     Patient Name:  Israel Moyer  Date:  2021    ASSESSMENT /SUBJECTIVE:    Today's INR result of 1.6 is subtherapeutic. Goal INR of 2.0-3.0      Warfarin dose taken: Warfarin taken as instructed    Diet: No new diet changes affecting INR    Medication changes/ interactions: No new medications/supplements affecting INR    Previous INR: Therapeutic     S/S of bleeding or thromboembolism: No    New injury or illness: No    Upcoming surgery, procedure or cardioversion: No    Additional findings: None. No factors to explain low INR. In addition to negative findings above, pt denies tobacco use, increased activity, or changes to alcohol intake.      PLAN:    Telephone call with Home Care RN Melany regarding INR result and instructed:     Warfarin Dosing Instructions: Change your warfarin dose to 5mg every Wed, Sat; 2.5mg all other days  . (12.5 % change)    Instructed patient to follow up no later than: 1 week  Orders given to  Homecare nurse/facility to recheck    Education provided: Target INR goal and significance of current INR result      Melany verbalizes understanding and agrees to warfarin dosing plan.    Instructed to call the Anticoagulation Clinic for any changes, questions or concerns. (#[phone])        Vinny Oliveira RN      OBJECTIVE:  Recent labs: (last 7 days)     21   INR 1.6*         No question data found.  Anticoagulation Summary  As of 2021    INR goal:  2.0-3.0   TTR:  93.7 % (1 y)   INR used for dosin.6 (2021)   Warfarin maintenance plan:  5 mg (5 mg x 1) every Wed, Sat; 2.5 mg (2.5 mg x 1) all other days   Full warfarin instructions:  5 mg every Wed, Sat; 2.5 mg all other days   Weekly warfarin total:  22.5 mg   Plan last modified:  Vinny Oliveira, SIXOT (2021)   Next INR check:  3/3/2021   Priority:  Maintenance   Target end date:  2021    Indications    Chronic atrial fibrillation (H) [I48.20]             Anticoagulation Episode  Summary     INR check location:      Preferred lab:  EXTERNAL LAB    Send INR reminders to:  DELIO ISRAEL    Comments:  Home Care      Anticoagulation Care Providers     Provider Role Specialty Phone number    Ry Shukla MD Referring Family Medicine [phone]

## 2021-02-25 NOTE — TELEPHONE ENCOUNTER
Patients daughter, Ngozi, is calling to check on status of home care orders.  [facility] has been waiting on Dr Shukla to get back to them in regards to getting home care orders.  Daughter stated that family cannot even give father a bath because he needs home care to help with taking care of him.  Daughter is frustrated because the whole process is taking so long.  Patient did have [facility] but was discharged from them and is now going to be getting home care services through [facility].     Rut Cole       DISPLAY PLAN FREE TEXT DISPLAY PLAN FREE TEXT

## 2021-02-26 DIAGNOSIS — Z79.01 LONG TERM CURRENT USE OF ANTICOAGULANT THERAPY: ICD-10-CM

## 2021-03-01 RX ORDER — WARFARIN SODIUM 2.5 MG/1
TABLET ORAL
Qty: 102 TABLET | Refills: 0 | Status: SHIPPED | OUTPATIENT
Start: 2021-03-01 | End: 2021-06-11

## 2021-03-01 NOTE — TELEPHONE ENCOUNTER
rx approved  INR/Prothrombin Time  INR   Date Value Ref Range Status   02/24/2021 1.6 (A) 0.90 - 1.10 Final        Sig updated     Carly Doe, RN, BSN, PHN

## 2021-03-02 ENCOUNTER — IMMUNIZATION (OUTPATIENT)
Dept: NURSING | Facility: CLINIC | Age: 86
End: 2021-03-02
Attending: FAMILY MEDICINE
Payer: COMMERCIAL

## 2021-03-02 PROCEDURE — 91300 PR COVID VAC PFIZER DIL RECON 30 MCG/0.3 ML IM: CPT

## 2021-03-02 PROCEDURE — 0002A PR COVID VAC PFIZER DIL RECON 30 MCG/0.3 ML IM: CPT

## 2021-03-04 ENCOUNTER — ANTICOAGULATION THERAPY VISIT (OUTPATIENT)
Dept: FAMILY MEDICINE | Facility: CLINIC | Age: 86
End: 2021-03-04

## 2021-03-04 DIAGNOSIS — I48.20 CHRONIC ATRIAL FIBRILLATION (H): ICD-10-CM

## 2021-03-04 LAB — INR PPP: 2.4 (ref 0.9–1.1)

## 2021-03-04 NOTE — PROGRESS NOTES
ANTICOAGULATION MANAGEMENT     Patient Name:  Israel Moyer  Date:  3/4/2021    ASSESSMENT /SUBJECTIVE:    Today's INR result of 2.4 is therapeutic. Goal INR of 2.0-3.0      Warfarin dose taken: Warfarin taken as instructed    Diet: No new diet changes affecting INR    Medication changes/ interactions: No new medications/supplements affecting INR    Previous INR: Subtherapeutic     S/S of bleeding or thromboembolism: No    New injury or illness: No    Upcoming surgery, procedure or cardioversion: No    Additional findings: None      PLAN:    Telephone call with home care nurse Anny regarding INR result and instructed:     Warfarin Dosing Instructions: Continue your current warfarin dose 5 mg every Wed, Sat; 2.5 mg all other days    Instructed patient to follow up no later than: 1 week  Orders given to  Homecare nurse/facility to recheck    Education provided: Monitoring for bleeding signs and symptoms and Monitoring for clotting signs and symptoms      Anny Nurse verbalizes understanding and agrees to warfarin dosing plan.    Instructed to call the Anticoagulation Clinic for any changes, questions or concerns. (#[phone])        Aranza Hernandes RN      OBJECTIVE:  Recent labs: (last 7 days)     21   INR 2.4*         No question data found.  Anticoagulation Summary  As of 3/4/2021    INR goal:  2.0-3.0   TTR:  92.6 % (1 y)   INR used for dosin.4 (3/4/2021)   Warfarin maintenance plan:  5 mg (5 mg x 1) every Wed, Sat; 2.5 mg (2.5 mg x 1) all other days   Full warfarin instructions:  5 mg every Wed, Sat; 2.5 mg all other days   Weekly warfarin total:  22.5 mg   No change documented:  Aranza Hernandes RN   Plan last modified:  Vinny Oliveira, SIXTO (2021)   Next INR check:  3/11/2021   Priority:  Maintenance   Target end date:  2021    Indications    Chronic atrial fibrillation (H) [I48.20]             Anticoagulation Episode Summary     INR check location:      Preferred lab:  EXTERNAL LAB     Send INR reminders to:  DELIO ISRAEL    Comments:  Home Care      Anticoagulation Care Providers     Provider Role Specialty Phone number    Ry Shukla MD Referring Family Medicine [phone]

## 2021-03-10 ENCOUNTER — TELEPHONE (OUTPATIENT)
Dept: FAMILY MEDICINE | Facility: CLINIC | Age: 86
End: 2021-03-10

## 2021-03-10 NOTE — TELEPHONE ENCOUNTER
Forms received from [employer]/ Revision to Plan of Care # [id number], # [id number] (cert period 2/7/2021 to 4/7/2021) for Mary Beth Hoyt DO.  Forms placed in provider 'sign me' folder.  Please fax forms to [phone] after completion.    Manny Benson RT (R) (ARRT)  Radiology Dept

## 2021-03-11 ENCOUNTER — ANTICOAGULATION THERAPY VISIT (OUTPATIENT)
Dept: FAMILY MEDICINE | Facility: CLINIC | Age: 86
End: 2021-03-11

## 2021-03-11 DIAGNOSIS — I48.20 CHRONIC ATRIAL FIBRILLATION (H): ICD-10-CM

## 2021-03-11 LAB — INR PPP: 2.6 (ref 0.9–1.1)

## 2021-03-11 NOTE — PROGRESS NOTES
ANTICOAGULATION MANAGEMENT     Patient Name:  Israel Moyer  Date:  3/11/2021    ASSESSMENT /SUBJECTIVE:    Today's INR result of 2.6 is therapeutic. Goal INR of 2.0-3.0      Warfarin dose taken: Warfarin taken as instructed    Diet: No new diet changes affecting INR    Medication changes/ interactions: No new medications/supplements affecting INR    Previous INR: Therapeutic     S/S of bleeding or thromboembolism: No    New injury or illness: No    Upcoming surgery, procedure or cardioversion: No    Additional findings: None      PLAN:    Telephone call with home care nurse Melany regarding INR result and instructed:     Warfarin Dosing Instructions: Continue your current warfarin dose 5 mg every Wed, Sat; 2.5 mg all other days    Instructed patient to follow up no later than: 1 week  Orders given to  Homecare nurse/facility to recheck    Education provided: Monitoring for bleeding signs and symptoms and Monitoring for clotting signs and symptoms      Melany Nurse verbalizes understanding and agrees to warfarin dosing plan.    Instructed to call the Anticoagulation Clinic for any changes, questions or concerns. (#[phone])        Aranza Hernandes RN      OBJECTIVE:  Recent labs: (last 7 days)     21   INR 2.6*         No question data found.  Anticoagulation Summary  As of 3/11/2021    INR goal:  2.0-3.0   TTR:  92.6 % (1 y)   INR used for dosin.6 (3/11/2021)   Warfarin maintenance plan:  5 mg (5 mg x 1) every Wed, Sat; 2.5 mg (2.5 mg x 1) all other days   Full warfarin instructions:  5 mg every Wed, Sat; 2.5 mg all other days   Weekly warfarin total:  22.5 mg   No change documented:  Aranza Hernandes RN   Plan last modified:  Vinny Oliveira, RN (2021)   Next INR check:  3/18/2021   Priority:  Maintenance   Target end date:  2021    Indications    Chronic atrial fibrillation (H) [I48.20]             Anticoagulation Episode Summary     INR check location:      Preferred lab:  EXTERNAL LAB     Send INR reminders to:  DELIO ISRAEL    Comments:  Home Care      Anticoagulation Care Providers     Provider Role Specialty Phone number    Ry Shukla MD Referring Family Medicine [phone]

## 2021-03-15 ENCOUNTER — TELEPHONE (OUTPATIENT)
Dept: FAMILY MEDICINE | Facility: CLINIC | Age: 86
End: 2021-03-15

## 2021-03-15 NOTE — TELEPHONE ENCOUNTER
Forms received from [facility]/ Revision to plan of care #[id number] SN ot INR 2.6 on 3/11/21 (cert period: 2/7/21 to 4/7/21) for Mary Beth Hoyt DO.  Forms placed in provider 'sign me' folder.  Please fax forms to [phone] after completion.    Manny Benson RT (R) (ARRT)  Radiology Dept

## 2021-03-18 ENCOUNTER — ANTICOAGULATION THERAPY VISIT (OUTPATIENT)
Dept: FAMILY MEDICINE | Facility: CLINIC | Age: 86
End: 2021-03-18

## 2021-03-18 DIAGNOSIS — I48.20 CHRONIC ATRIAL FIBRILLATION (H): ICD-10-CM

## 2021-03-18 LAB — INR PPP: 2.7 (ref 0.9–1.1)

## 2021-03-18 NOTE — PROGRESS NOTES
ANTICOAGULATION MANAGEMENT     Patient Name:  Israel Moyer  Date:  3/18/2021    ASSESSMENT /SUBJECTIVE:    Today's INR result of 2.7 is therapeutic. Goal INR of 2.0-3.0      Warfarin dose taken: Warfarin taken as instructed    Diet: No new diet changes affecting INR    Medication changes/ interactions: No new medications/supplements affecting INR    Previous INR: Therapeutic     S/S of bleeding or thromboembolism: No    New injury or illness: No    Upcoming surgery, procedure or cardioversion: No    Additional findings: None      PLAN:    Telephone call with home care nurse Melany regarding INR result and instructed:     Warfarin Dosing Instructions: Continue your current warfarin dose 5 mg every Wed, Sat; 2.5 mg all other days    Instructed patient to follow up no later than: 1 week  Orders given to  Homecare nurse/facility to recheck    Education provided: Monitoring for bleeding signs and symptoms and Monitoring for clotting signs and symptoms      Melany verbalizes understanding and agrees to warfarin dosing plan.    Instructed to call the Anticoagulation Clinic for any changes, questions or concerns. (#[phone])        Aranza Hernandes RN      OBJECTIVE:  Recent labs: (last 7 days)     21   INR 2.7*         No question data found.  Anticoagulation Summary  As of 3/18/2021    INR goal:  2.0-3.0   TTR:  92.6 % (1 y)   INR used for dosin.7 (3/18/2021)   Warfarin maintenance plan:  5 mg (5 mg x 1) every Wed, Sat; 2.5 mg (2.5 mg x 1) all other days   Full warfarin instructions:  5 mg every Wed, Sat; 2.5 mg all other days   Weekly warfarin total:  22.5 mg   No change documented:  Aranza Hernandes RN   Plan last modified:  Vinny Oliveira, SIXTO (2021)   Next INR check:  3/25/2021   Priority:  Maintenance   Target end date:  2021    Indications    Chronic atrial fibrillation (H) [I48.20]             Anticoagulation Episode Summary     INR check location:      Preferred lab:  EXTERNAL LAB    Send  INR reminders to:  DELIO ISRAEL    Comments:  Home Care      Anticoagulation Care Providers     Provider Role Specialty Phone number    Ry Shukla MD Referring Family Medicine [phone]

## 2021-03-19 ENCOUNTER — MEDICAL CORRESPONDENCE (OUTPATIENT)
Dept: HEALTH INFORMATION MANAGEMENT | Facility: CLINIC | Age: 86
End: 2021-03-19

## 2021-03-25 ENCOUNTER — ANTICOAGULATION THERAPY VISIT (OUTPATIENT)
Dept: FAMILY MEDICINE | Facility: CLINIC | Age: 86
End: 2021-03-25

## 2021-03-25 DIAGNOSIS — I48.20 CHRONIC ATRIAL FIBRILLATION (H): ICD-10-CM

## 2021-03-25 LAB — INR PPP: 3.5 (ref 0.9–1.1)

## 2021-03-25 NOTE — PROGRESS NOTES
ANTICOAGULATION MANAGEMENT     Patient Name:  Israel Moyer  Date:  3/25/2021    ASSESSMENT /SUBJECTIVE:    Today's INR result of 3.5 is supratherapeutic. Goal INR of 2.0-3.0      Warfarin dose taken: Warfarin taken as instructed    Diet: No new diet changes affecting INR    Medication changes/ interactions: No new medications/supplements affecting INR    Previous INR: Therapeutic     S/S of bleeding or thromboembolism: No    New injury or illness: No    Upcoming surgery, procedure or cardioversion: No    Additional findings: None      PLAN:    Telephone call with home care nurse SIXTO Mosley regarding INR result and instructed:     Warfarin Dosing Instructions: Change your warfarin dose to 5mg every Wed; 2.5mg all other days of the week.   . (11.1 % change)    Instructed patient to follow up no later than: 1 week  Orders given to  Homecare nurse/facility to recheck    Education provided: Target INR goal and significance of current INR result, Importance of therapeutic range, Importance of following up for INR monitoring at instructed interval and Importance of taking warfarin as instructed      Israel verbalizes understanding and agrees to warfarin dosing plan.    Instructed to call the Anticoagulation Clinic for any changes, questions or concerns. (#[phone])        William Hernandez RN      OBJECTIVE:  Recent labs: (last 7 days)     03/25/21   INR 3.5*         No question data found.  Anticoagulation Summary  As of 3/25/2021    INR goal:  2.0-3.0   TTR:  91.4 % (1 y)   INR used for dosing:  3.5 (3/25/2021)   Warfarin maintenance plan:  5 mg (5 mg x 1) every Wed, Sat; 2.5 mg (2.5 mg x 1) all other days   Full warfarin instructions:  5 mg every Wed, Sat; 2.5 mg all other days   Weekly warfarin total:  22.5 mg   Plan last modified:  Vinny Oliveira, SIXTO (2/24/2021)   Next INR check:     Priority:  Maintenance   Target end date:  7/27/2021    Indications    Chronic atrial fibrillation (H) [I48.20]              Anticoagulation Episode Summary     INR check location:      Preferred lab:  EXTERNAL LAB    Send INR reminders to:  DELIO ISRAEL    Comments:  Home Care      Anticoagulation Care Providers     Provider Role Specialty Phone number    Ry Shukla MD Referring Family Medicine [phone]

## 2021-03-29 ENCOUNTER — TELEPHONE (OUTPATIENT)
Dept: FAMILY MEDICINE | Facility: CLINIC | Age: 86
End: 2021-03-29

## 2021-03-29 ENCOUNTER — MEDICAL CORRESPONDENCE (OUTPATIENT)
Dept: HEALTH INFORMATION MANAGEMENT | Facility: CLINIC | Age: 86
End: 2021-03-29

## 2021-03-29 NOTE — TELEPHONE ENCOUNTER
Forms received from [employer] Revision to Plan of Care/ Cert. Period 2/7/2021-4/7/2021 for Mary Beth Hoyt DO.  Forms placed in provider 'sign me' folder.  Please fax forms to [phone] after completion.    SHAHIDA BLAS (R)  Radiology Department

## 2021-04-01 ENCOUNTER — ANTICOAGULATION THERAPY VISIT (OUTPATIENT)
Dept: FAMILY MEDICINE | Facility: CLINIC | Age: 86
End: 2021-04-01

## 2021-04-01 DIAGNOSIS — I48.20 CHRONIC ATRIAL FIBRILLATION (H): ICD-10-CM

## 2021-04-01 LAB — INR PPP: 3.6 (ref 0.9–1.1)

## 2021-04-01 NOTE — PROGRESS NOTES
ANTICOAGULATION MANAGEMENT     Patient Name:  Israel Moyer  Date:  4/1/2021    ASSESSMENT /SUBJECTIVE:    Today's INR result of 3.6 is supratherapeutic. Goal INR of 2.0-3.0      Warfarin dose taken: Warfarin taken as instructed    Diet: No new diet changes affecting INR    Medication changes/ interactions: No new medications/supplements affecting INR    Previous INR: Supratherapeutic     S/S of bleeding or thromboembolism: No    New injury or illness: No    Upcoming surgery, procedure or cardioversion: No    Additional findings: None      PLAN:    Telephone call with home care nurse Melany regarding INR result and instructed:     Warfarin Dosing Instructions: Change your warfarin dose to 2.5 mg daily  . (12 % change)    Instructed patient to follow up no later than: 1 week  Orders given to  Homecare nurse/facility to recheck    Education provided: Monitoring for bleeding signs and symptoms and Monitoring for clotting signs and symptoms      Melany Nurse verbalizes understanding and agrees to warfarin dosing plan.    Instructed to call the Anticoagulation Clinic for any changes, questions or concerns. (#[phone])        Aranza Hernandes RN      OBJECTIVE:  Recent labs: (last 7 days)     04/01/21   INR 3.6*         No question data found.  Anticoagulation Summary  As of 4/1/2021    INR goal:  2.0-3.0   TTR:  89.5 % (1 y)   INR used for dosing:  3.6 (4/1/2021)   Warfarin maintenance plan:  2.5 mg (2.5 mg x 1) every day   Full warfarin instructions:  2.5 mg every day   Weekly warfarin total:  17.5 mg   Plan last modified:  Aranza Hernandes RN (4/1/2021)   Next INR check:  4/8/2021   Priority:  Maintenance   Target end date:  7/27/2021    Indications    Chronic atrial fibrillation (H) [I48.20]             Anticoagulation Episode Summary     INR check location:      Preferred lab:  EXTERNAL LAB    Send INR reminders to:  DELIO ISRAEL    Comments:  Home Care      Anticoagulation Care Providers     Provider Role  Specialty Phone number    Ry Shukla MD Referring Family Medicine [phone]

## 2021-04-07 ENCOUNTER — TELEPHONE (OUTPATIENT)
Dept: FAMILY MEDICINE | Facility: CLINIC | Age: 86
End: 2021-04-07

## 2021-04-07 ENCOUNTER — ANTICOAGULATION THERAPY VISIT (OUTPATIENT)
Dept: FAMILY MEDICINE | Facility: CLINIC | Age: 86
End: 2021-04-07

## 2021-04-07 DIAGNOSIS — I48.20 CHRONIC ATRIAL FIBRILLATION (H): ICD-10-CM

## 2021-04-07 LAB — INR PPP: 2.2 (ref 0.9–1.1)

## 2021-04-07 NOTE — PROGRESS NOTES
ANTICOAGULATION MANAGEMENT     Patient Name:  Israel Moyer  Date:  2021    ASSESSMENT /SUBJECTIVE:    Today's INR result of 2.2 is subtherapeutic. Goal INR of 2.0-3.0      Warfarin dose taken: Warfarin taken as instructed    Diet: No new diet changes affecting INR    Medication changes/ interactions: No new medications/supplements affecting INR    Previous INR: Supratherapeutic     S/S of bleeding or thromboembolism: No    New injury or illness: No    Upcoming surgery, procedure or cardioversion: No    Additional findings: INR dropped from 3.6 to 2.2      PLAN:    Telephone call with home care nurse Melany regarding INR result and instructed:     Warfarin Dosing Instructions: Change your warfarin dose to 5 mg wed; 2.5 mg all other days  . (14 % change)    Instructed patient to follow up no later than: 1 week  Orders given to  Homecare nurse/facility to recheck    Education provided: Please call back if any changes to your diet, medications or how you've been taking warfarin      Melany verbalizes understanding and agrees to warfarin dosing plan.    Instructed to call the Anticoagulation Clinic for any changes, questions or concerns. (#[phone])        Aranza Hernandes RN      OBJECTIVE:  Recent labs: (last 7 days)     21   INR 3.6* 2.2*         No question data found.  Anticoagulation Summary  As of 2021    INR goal:  2.0-3.0   TTR:  88.8 % (1 y)   INR used for dosin.2 (2021)   Warfarin maintenance plan:  5 mg (2.5 mg x 2) every Wed; 2.5 mg (2.5 mg x 1) all other days   Full warfarin instructions:  5 mg every Wed; 2.5 mg all other days   Weekly warfarin total:  20 mg   Plan last modified:  Aranza Hernandes RN (2021)   Next INR check:  2021   Priority:  Maintenance   Target end date:  2021    Indications    Chronic atrial fibrillation (H) [I48.20]             Anticoagulation Episode Summary     INR check location:      Preferred lab:  EXTERNAL LAB    Send INR  reminders to:  DELIO ISRAEL    Comments:  Home Care      Anticoagulation Care Providers     Provider Role Specialty Phone number    Ry Shukla MD Referring Family Medicine [phone]

## 2021-04-07 NOTE — TELEPHONE ENCOUNTER
Home Health Care requesting following orders.    Verbal orders requested:   Orders: SN 1 visit x 9 weeks  Home Health aid, two visits x 9 weeks  Home making one visit x 9 weeks    RN also wants to relay to PCP: Patient has been complaining of having sensation of having an erection that lasts for hours at a time, though no erection is present. This sensation occurs about twice a week.     Patient complained of sensation today, RN confirmed no erection was present. Patient states he wants to have a circumcision, as it is difficult for him to retract foreskin and causes difficulty urinating.     RN attempted to contact patient to schedule a visit with PCP regarding concern above.     Attempt #1 to call patient.     Patient did not answer, RN left voicemail at home number. RN requested patient return call to [facility] at [phone].     Also routing to PCP to review and approve requested home care orders    Mari Kaur RN, BSN, PHN  [facility]: [place]          Mari Kaur RN, BSN, PHN  [facility]: [place]

## 2021-04-08 ENCOUNTER — TELEPHONE (OUTPATIENT)
Dept: FAMILY MEDICINE | Facility: CLINIC | Age: 86
End: 2021-04-08

## 2021-04-08 NOTE — TELEPHONE ENCOUNTER
Patient returned call, scheduled with Dr. Gao.     Mari Kaur, RN, BSN, PHN  [facility]: [place]

## 2021-04-09 ENCOUNTER — TELEPHONE (OUTPATIENT)
Dept: FAMILY MEDICINE | Facility: CLINIC | Age: 86
End: 2021-04-09

## 2021-04-09 ENCOUNTER — OFFICE VISIT (OUTPATIENT)
Dept: FAMILY MEDICINE | Facility: CLINIC | Age: 86
End: 2021-04-09
Payer: COMMERCIAL

## 2021-04-09 VITALS
WEIGHT: 230 LBS | BODY MASS INDEX: 32.93 KG/M2 | DIASTOLIC BLOOD PRESSURE: 76 MMHG | RESPIRATION RATE: 20 BRPM | OXYGEN SATURATION: 95 % | HEART RATE: 84 BPM | TEMPERATURE: 97.7 F | HEIGHT: 70 IN | SYSTOLIC BLOOD PRESSURE: 130 MMHG

## 2021-04-09 DIAGNOSIS — R39.198 ABNORMAL URINATION: Primary | ICD-10-CM

## 2021-04-09 PROCEDURE — 99213 OFFICE O/P EST LOW 20 MIN: CPT | Performed by: FAMILY MEDICINE

## 2021-04-09 ASSESSMENT — PAIN SCALES - GENERAL: PAINLEVEL: NO PAIN (0)

## 2021-04-09 ASSESSMENT — MIFFLIN-ST. JEOR: SCORE: 1744.52

## 2021-04-09 NOTE — PROGRESS NOTES
"    Assessment & Plan   (O30.202) Abnormal urination  (primary encounter diagnosis)  Patient was concern of fork skin of penis could be covering his urethra.  However, it was easy to retract, no restriction.  No sign of infection.  Advised the patient before he urinates to retract the foreskin skin backward.    No follow-ups on file.    Angela Gao MD  [facility] is a 85 year old who presents for the following health issues  accompanied by his self:    HPI     Concern - Penis/Foreskin issue  Onset:  Noticed for a while now  Description: Patient states that his foreskin seems to be closing and he's having a hard time urinating - but denies any pain or concerns for bladder infection  Intensity: mild  Progression of Symptoms:  worsening  Accompanying Signs & Symptoms: None  Previous history of similar problem: None  Precipitating factors:        Worsened by: None  Alleviating factors:        Improved by: None  Therapies tried and outcome:  none       Review of Systems   Constitutional, HEENT, cardiovascular, pulmonary, gi and gu systems are negative, except as otherwise noted.      Objective    /76 (BP Location: Right arm, Patient Position: Sitting, Cuff Size: Adult Large)   Pulse 84   Temp 97.7  F (36.5  C) (Oral)   Resp 20   Ht 1.778 m (5' 10\")   Wt 104.3 kg (230 lb)   SpO2 95%   BMI 33.00 kg/m    Body mass index is 33 kg/m .  Physical Exam   GENERAL: healthy, alert and no distress   (male): normal male genitalia without lesions or urethral discharge, no hernia  Atrophy of fork skin, easily retracted, no restriction and no pain no swelling, no sign of infections.    Angela Gao MD          "

## 2021-04-09 NOTE — TELEPHONE ENCOUNTER
Routing to Dr. Gao - see phone message below    Daughter concerned about pt not being able to retract his foreskin back on his own any more. He has arthritic hands and it is very hard for him to manipulate his hands. They are concerned about urine dripping into the foreskin and him being higher risk for infection. They are wondering if they could speak to a urologist about what his options are.     Referral pending approval if appropriate    Wendi Garrett RN

## 2021-04-09 NOTE — TELEPHONE ENCOUNTER
I have referred to Urology at [place] locations  Otherwise, the fork skin is not to tight and it's more atrophy of the skin  Thanks,

## 2021-04-09 NOTE — TELEPHONE ENCOUNTER
Called pt's daughter Veronica and informed urology referral placed and can call to schedule appt.     Linda Lopez MA

## 2021-04-09 NOTE — TELEPHONE ENCOUNTER
Reason for Call:  Other call back    Detailed comments: Patient's daughter would like a call from the nurse to discuss the appt patient had today 4/9 with Sima Becerra . Please call to discuss.     Phone Number Patient can be reached at: [phone]     Best Time: any     Can we leave a detailed message on this number? YES    Call taken on 4/9/2021 at 9:42 AM by Baylee Grey

## 2021-04-12 ENCOUNTER — TELEPHONE (OUTPATIENT)
Dept: FAMILY MEDICINE | Facility: CLINIC | Age: 86
End: 2021-04-12

## 2021-04-12 NOTE — TELEPHONE ENCOUNTER
Forms received from [facility]/ Revision to Plan of Care # [id number] (cert period 2/7/21 to 4/7/21), Home Health Cert and Plan of Care # [id number] (cert period 4/8/21 to 6/6/21),  Addendum to plan of care POC # [id number],  Revision to Plan of Care # [id number] (cert period 4/8/21 to 6/6/21) for Mary Beth Hoyt DO.  Forms placed in provider 'sign me' folder.  Please fax forms to [phone] after completion.    Manny Benson RT (R) (ARRT)  Radiology Dept

## 2021-04-15 ENCOUNTER — ANTICOAGULATION THERAPY VISIT (OUTPATIENT)
Dept: FAMILY MEDICINE | Facility: CLINIC | Age: 86
End: 2021-04-15

## 2021-04-15 ENCOUNTER — MEDICAL CORRESPONDENCE (OUTPATIENT)
Dept: HEALTH INFORMATION MANAGEMENT | Facility: CLINIC | Age: 86
End: 2021-04-15

## 2021-04-15 DIAGNOSIS — I48.20 CHRONIC ATRIAL FIBRILLATION (H): ICD-10-CM

## 2021-04-15 LAB — INR PPP: 2.6 (ref 0.9–1.1)

## 2021-04-15 NOTE — PROGRESS NOTES
ANTICOAGULATION MANAGEMENT     Patient Name:  Israel Moyer  Date:  4/15/2021    ASSESSMENT /SUBJECTIVE:    Today's INR result of 2.6 is therapeutic. Goal INR of 2.0-3.0      Warfarin dose taken: Warfarin taken as instructed    Diet: No new diet changes affecting INR    Medication changes/ interactions: No new medications/supplements affecting INR    Previous INR: Therapeutic     S/S of bleeding or thromboembolism: No    New injury or illness: No    Upcoming surgery, procedure or cardioversion: No    Additional findings: None      PLAN:    Telephone call with home care nurse SIXTO Mosley regarding INR result and instructed:     Warfarin Dosing Instructions: Continue your current warfarin dose 5mg every Wed; 2.5mg all other days of the week.     Instructed patient to follow up no later than: 2 weeks  Orders given to  Homecare nurse/facility to recheck    Education provided: Target INR goal and significance of current INR result and Importance of therapeutic range      SIXTO Mosley verbalizes understanding and agrees to warfarin dosing plan.    Instructed to call the Anticoagulation Clinic for any changes, questions or concerns. (#[phone])        William Hernandez RN      OBJECTIVE:  Recent labs: (last 7 days)     04/15/21   INR 2.6*         No question data found.  Anticoagulation Summary  As of 4/15/2021    INR goal:  2.0-3.0   TTR:  88.8 % (1 y)   INR used for dosin.6 (4/15/2021)   Warfarin maintenance plan:  5 mg (2.5 mg x 2) every Wed; 2.5 mg (2.5 mg x 1) all other days   Full warfarin instructions:  5 mg every Wed; 2.5 mg all other days   Weekly warfarin total:  20 mg   Plan last modified:  Aranza Hernandes RN (2021)   Next INR check:     Priority:  Maintenance   Target end date:  2021    Indications    Chronic atrial fibrillation (H) [I48.20]             Anticoagulation Episode Summary     INR check location:      Preferred lab:  EXTERNAL LAB    Send INR reminders to:  DELIO ISRAEL    Comments:  Home  Care      Anticoagulation Care Providers     Provider Role Specialty Phone number    Ry Shukla MD Referring Family Medicine [phone]

## 2021-04-22 ENCOUNTER — TELEPHONE (OUTPATIENT)
Dept: FAMILY MEDICINE | Facility: CLINIC | Age: 86
End: 2021-04-22

## 2021-04-22 NOTE — TELEPHONE ENCOUNTER
Home care nurse reported the last few days patient has reported some dizziness and that this occurs every time he stands up.  This does subside rather quickly after he stands still for a few minutes.  He does not drink much water, even though this is encouraged at every visit.    Vitals Today  /64  p 85    The home care RN states that his BP and P are constantly on the lower end of normal.   She is wondering if maybe one of his BP medications should be lowered.  -Metoprolol 100mg  -Lisinopril 10mg  -Lasix 40mg    Would you like a phone visit or BP f/u visit?     Lucila Castro, RN

## 2021-04-23 NOTE — TELEPHONE ENCOUNTER
I would like the patient to do an in person visit for this.  He should also contact his cardiologist to determine if they need to reduce his Lasix    Mary Beth Garciaaniket DO

## 2021-04-29 ENCOUNTER — ANTICOAGULATION THERAPY VISIT (OUTPATIENT)
Dept: FAMILY MEDICINE | Facility: CLINIC | Age: 86
End: 2021-04-29

## 2021-04-29 DIAGNOSIS — I48.20 CHRONIC ATRIAL FIBRILLATION (H): ICD-10-CM

## 2021-04-29 LAB — INR PPP: 4 (ref 0.9–1.1)

## 2021-04-29 NOTE — PROGRESS NOTES
ANTICOAGULATION MANAGEMENT     Patient Name:  Israel Moyer  Date:  2021    ASSESSMENT /SUBJECTIVE:    Today's INR result of 4.0 is supratherapeutic. Goal INR of 2.0-3.0      Warfarin dose taken: Warfarin taken as instructed    Diet: No new diet changes affecting INR    Medication changes/ interactions: Tylenol prn     Previous INR: Therapeutic     S/S of bleeding or thromboembolism: No    New injury or illness: Arthritis flare up in patient's neck.     Upcoming surgery, procedure or cardioversion: No    Additional findings: None      PLAN:    Telephone call with Israel and HC RN Melany regarding INR result and instructed:     Warfarin Dosing Instructions: Pt instructed to hold today's dose and then reduce tomorrow's to 1.25 mg; then continue your current warfarin dose of 5 mg Wed + 2.5 mg AOD.    Instructed patient to follow up no later than: 1 week  Orders given to  Homecare nurse/facility to recheck    Education provided: Please call back if any changes to your diet, medications or how you've been taking warfarin, Monitoring for bleeding signs and symptoms, Monitoring for clotting signs and symptoms and Importance of notifying clinic for changes in medications; a sooner lab recheck maybe needed.      BRUCE Mosley RN verbalizes understanding and agrees to warfarin dosing plan.    Instructed to call the Anticoagulation Clinic for any changes, questions or concerns. (#[phone])        Rosanna Callejas RN      OBJECTIVE:  Recent labs: (last 7 days)     21   INR 4.0*         No question data found.  Anticoagulation Summary  As of 2021    INR goal:  2.0-3.0   TTR:  86.0 % (1 y)   INR used for dosin.0 (2021)   Warfarin maintenance plan:  5 mg (2.5 mg x 2) every Wed; 2.5 mg (2.5 mg x 1) all other days   Full warfarin instructions:  5 mg every Wed; 2.5 mg all other days   Weekly warfarin total:  20 mg   Plan last modified:  Aranza Hernandes RN (2021)   Next INR check:     Priority:   Maintenance   Target end date:  7/27/2021    Indications    Chronic atrial fibrillation (H) [I48.20]             Anticoagulation Episode Summary     INR check location:      Preferred lab:  EXTERNAL LAB    Send INR reminders to:  DELIO ISRAEL    Comments:  Home Care      Anticoagulation Care Providers     Provider Role Specialty Phone number    Ry Shukla MD Referring Family Medicine [phone]

## 2021-05-04 ENCOUNTER — TELEPHONE (OUTPATIENT)
Dept: FAMILY MEDICINE | Facility: CLINIC | Age: 86
End: 2021-05-04

## 2021-05-04 DIAGNOSIS — I48.20 CHRONIC ATRIAL FIBRILLATION (H): Primary | ICD-10-CM

## 2021-05-04 NOTE — TELEPHONE ENCOUNTER
Forms received from [facility]/ Revision to plan of care # [id number] (cert period: 4/8/21 to 6/6/21) for Mary Beth Hoyt DO.  Forms placed in provider 'sign me' folder.  Please fax forms to [phone] after completion.    Manny Benson RT (R) (ARRT)  Radiology Dept

## 2021-05-04 NOTE — PROGRESS NOTES
Please review and sign new INR referral original pcp is no longer at the clinic.  Elsa Liz Rn  [facility]

## 2021-05-06 ENCOUNTER — ANTICOAGULATION THERAPY VISIT (OUTPATIENT)
Dept: FAMILY MEDICINE | Facility: CLINIC | Age: 86
End: 2021-05-06

## 2021-05-06 DIAGNOSIS — I48.20 CHRONIC ATRIAL FIBRILLATION (H): ICD-10-CM

## 2021-05-06 LAB — INR PPP: 2.6 (ref 0.9–1.1)

## 2021-05-06 NOTE — PROGRESS NOTES
ANTICOAGULATION MANAGEMENT     Patient Name:  Israel Moyer  Date:  2021    ASSESSMENT /SUBJECTIVE:    Today's INR result of 2.6 is therapeutic. Goal INR of 2.0-3.0      Warfarin dose taken: Warfarin taken as instructed    Diet: No new diet changes affecting INR    Medication changes/ interactions: No new medications/supplements affecting INR    Previous INR: Supratherapeutic     S/S of bleeding or thromboembolism: No    New injury or illness: No    Upcoming surgery, procedure or cardioversion: No    Additional findings: None      PLAN:    Telephone call with home care nurse Melany regarding INR result and instructed:     Warfarin Dosing Instructions: Continue your current warfarin dose 5 mg Wed; 2.5 mg all toher days    Instructed patient to follow up no later than: 1 week  Orders given to  Homecare nurse/facility to recheck    Education provided: Please call back if any changes to your diet, medications or how you've been taking warfarin, Monitoring for bleeding signs and symptoms and Monitoring for clotting signs and symptoms      Melany verbalizes understanding and agrees to warfarin dosing plan.    Instructed to call the Anticoagulation Clinic for any changes, questions or concerns. (#[phone])        Aranza Hernandes RN      OBJECTIVE:  Recent labs: (last 7 days)     21   INR 2.6*         No question data found.  Anticoagulation Summary  As of 2021    INR goal:  2.0-3.0   TTR:  84.7 % (1 y)   INR used for dosin.6 (2021)   Warfarin maintenance plan:  5 mg (2.5 mg x 2) every Wed; 2.5 mg (2.5 mg x 1) all other days   Full warfarin instructions:  5 mg every Wed; 2.5 mg all other days   Weekly warfarin total:  20 mg   No change documented:  Aranza Hernandes RN   Plan last modified:  Aranza Hernandes RN (2021)   Next INR check:  2021   Priority:  Maintenance   Target end date:  2021    Indications    Chronic atrial fibrillation (H) [I48.20]             Anticoagulation Episode  Summary     INR check location:      Preferred lab:  EXTERNAL LAB    Send INR reminders to:  DELIO ISRAEL    Comments:  Home Care      Anticoagulation Care Providers     Provider Role Specialty Phone number    Ry Shukla MD Referring Family Medicine [phone]    Mary Beth Hoyt DO Referring Family Medicine [phone]

## 2021-05-07 ENCOUNTER — MEDICAL CORRESPONDENCE (OUTPATIENT)
Dept: HEALTH INFORMATION MANAGEMENT | Facility: CLINIC | Age: 86
End: 2021-05-07

## 2021-05-10 ENCOUNTER — MEDICAL CORRESPONDENCE (OUTPATIENT)
Dept: HEALTH INFORMATION MANAGEMENT | Facility: CLINIC | Age: 86
End: 2021-05-10

## 2021-05-10 ENCOUNTER — OFFICE VISIT (OUTPATIENT)
Dept: UROLOGY | Facility: CLINIC | Age: 86
End: 2021-05-10
Payer: COMMERCIAL

## 2021-05-10 ENCOUNTER — TELEPHONE (OUTPATIENT)
Dept: FAMILY MEDICINE | Facility: CLINIC | Age: 86
End: 2021-05-10

## 2021-05-10 VITALS — DIASTOLIC BLOOD PRESSURE: 77 MMHG | SYSTOLIC BLOOD PRESSURE: 138 MMHG | HEART RATE: 64 BPM | OXYGEN SATURATION: 96 %

## 2021-05-10 DIAGNOSIS — R39.198 URINE STREAM SPRAYING: ICD-10-CM

## 2021-05-10 DIAGNOSIS — N47.1 PHIMOSIS: Primary | ICD-10-CM

## 2021-05-10 PROCEDURE — 99203 OFFICE O/P NEW LOW 30 MIN: CPT | Performed by: UROLOGY

## 2021-05-10 NOTE — TELEPHONE ENCOUNTER
Forms received from [facility]/Revision to Plan of Care/ # [id number] for Mary Beth Hoyt DO.  Forms placed in provider 'sign me' folder.  Please fax forms to [phone] after completion.    SHAHIDA BLAS (R)  Radiology Department

## 2021-05-10 NOTE — TELEPHONE ENCOUNTER
Forms received from [facility]/Revision to Plan of Care/# [id number]/ Cert Period: 4/8/2021-6/6/2021 for Mary Beth Hoyt DO.  Forms placed in provider 'sign me' folder.  Please fax forms to [phone] after completion.    SHAHIDA BLAS (R)  Radiology Department

## 2021-05-10 NOTE — PATIENT INSTRUCTIONS
Your procedure is scheduled 6/28/2021 arrive 8:45 for 9:00am procedure.  Please stop blood thinners 5 days prior to your procedure. Eat and drink normally prior to the procedure.  Please call our office if you have questions or need to report any information, [phone].

## 2021-05-10 NOTE — PROGRESS NOTES
S: Patient is a pleasant 85-year-old male who was requested to be seen by Dr. Mary Beth Hoyt for a consultation with regard to patient's penile foreskin.  His foreskin seems to be closing.  He has difficulty with urinating with spraying of his urinary stream.  He denies any infections or balanitis.  Current Outpatient Medications   Medication Sig Dispense Refill     allopurinol (ZYLOPRIM) 100 MG tablet Take 2 tablets (200 mg) by mouth daily 180 tablet 3     cyanocobalamin (VITAMIN B-12) 1000 MCG tablet Take 1 tablet (1,000 mcg) by mouth daily 30 tablet 0     diclofenac (VOLTAREN) 1 % topical gel Place 2 g onto the skin 4 times daily As needed 100 g 0     furosemide (LASIX) 40 MG tablet Take 1 tablet (40 mg) by mouth daily 90 tablet 1     levothyroxine (SYNTHROID/LEVOTHROID) 50 MCG tablet Take 1 tablet (50 mcg) by mouth daily 90 tablet 3     lisinopril (ZESTRIL) 10 MG tablet Take 1 tablet (10 mg) by mouth daily 90 tablet 1     metoprolol succinate ER (TOPROL-XL) 100 MG 24 hr tablet Take 1 tablet (100 mg) by mouth daily 90 tablet 1     order for DME Left knee brace to assist with ambulation and prevent knee instability due to arthritis   diagnosis code [id number]     Please fax to Anna at [phone] 1 each 0     rosuvastatin (CRESTOR) 40 MG tablet Take 1 tablet (40 mg) by mouth daily 90 tablet 3     warfarin ANTICOAGULANT (COUMADIN) 2.5 MG tablet Take 2 tablets Wed & Sunday, 1 tablets all other days 102 tablet 0     Allergies   Allergen Reactions     Oxycodone      nausea     Simvastatin Cramps     Past Medical History:   Diagnosis Date     Atrial fibrillation (H) 2004    medically managed     Congestive heart failure, unspecified 2004    Dr. Wolfe     Essential hypertension, benign      Generalized osteoarthrosis, unspecified site      Gout, unspecified     followed by Dr. 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     JOINT REPLACEMTN, KNEE RT/LT  ~2005    Joint Replacement knee LT      Family History   Problem Relation Age of Onset     Hypertension Mother      C.A.D. Father      Diabetes Father      Hypertension Father      Hypertension Sister      Cerebrovascular Disease No family hx of      Cancer - colorectal No family hx of      Prostate Cancer No family hx of      Social History     Socioeconomic History     Marital status:      Spouse name: None     Number of children: 5     Years of education: 14     Highest education level: None   Occupational History     Occupation:    Social Needs     Financial resource strain: Not hard at all     Food insecurity     Worry: Never true     Inability: Never true     Transportation needs     Medical: No     Non-medical: No   Tobacco Use     Smoking status: Never Smoker     Smokeless tobacco: Never Used   Substance and Sexual Activity     Alcohol use: Yes     Comment: not much     Drug use: No     Sexual activity: Not Currently   Lifestyle     Physical activity     Days per week: 4 days     Minutes per session: 20 min     Stress: Only a little   Relationships     Social connections     Talks on phone: More than three times a week     Gets together: More than three times a week     Attends [employer] service: Never     Active member of club or organization: No     Attends meetings of clubs or organizations: Never     Relationship status:      Intimate partner violence     Fear of current or ex partner: No     Emotionally abused: No     Physically abused: No     Forced sexual activity: No   Other Topics Concern     Parent/sibling w/ CABG, MI or angioplasty before 65F 55M? Yes   Social History  Narrative     None       REVIEW OF SYSTEMS  =================  C: NEGATIVE for fever, chills, change in weight  I: NEGATIVE for worrisome rashes, moles or lesions  E/M: NEGATIVE for ear, mouth and throat problems  R: NEGATIVE for significant cough or SHORTNESS OF BREATH  CV:  NEGATIVE for chest pain, palpitations or peripheral edema  GI: NEGATIVE for nausea, abdominal pain, heartburn, or change in bowel habits  NEURO: NEGATIVE numbness/weakness  : see HPI  PSYCH: NEGATIVE depression/anxiety  LYmph: no new enlarged lymph nodes  Ortho: no new trauma/movements      Physical Exam:  /77   Pulse 64   SpO2 96%    Patient is pleasant, in no acute distress, good general condition.  Heart:  negative, PMI normal  Lung: no evidence of respiratory distress    Abdomen: Soft, nondistended, non tender. No masses. No rebound or guarding.   Exam: penis with phimosis.  Testis no masses.  No scrotal skin lesion.  Skin: Warm and dry.  No redness.  Neuro: grossly normal  Musculaskeletal: moving all extremities  Psych normal mood and affect  Musculoskeletal  moving all extremities  Hematologic/Lymphatic/Immunologic: normal ant/post cervical, axillary, supraclavicular and inguinal nodes    Assessment/Plan:   85-year-old male with phimosis.  Treatment options discussed.  We discussed observation versus circumcision versus dorsal slit.  We will schedule for an elective dorsal slit.  Risk of infection and bleeding discussed.  Patient asked to stop Coumadin 5 days prior to procedure.

## 2021-05-12 ENCOUNTER — ANTICOAGULATION THERAPY VISIT (OUTPATIENT)
Dept: FAMILY MEDICINE | Facility: CLINIC | Age: 86
End: 2021-05-12

## 2021-05-12 ENCOUNTER — TELEPHONE (OUTPATIENT)
Dept: FAMILY MEDICINE | Facility: CLINIC | Age: 86
End: 2021-05-12

## 2021-05-12 DIAGNOSIS — I48.20 CHRONIC ATRIAL FIBRILLATION (H): ICD-10-CM

## 2021-05-12 LAB — INR PPP: 2 (ref 0.9–1.1)

## 2021-05-12 NOTE — TELEPHONE ENCOUNTER
Pt is having a penile dorsal slit on 06/28. Per pt, he needs to be off warfarin for 5 days prior to the procedure. Dr. Rosado is doing the procedure ([facility]). Routing to [facility] for hold/bridge risk assessment.

## 2021-05-12 NOTE — PROGRESS NOTES
ANTICOAGULATION MANAGEMENT     Patient Name:  Israel Moyer  Date:  2021    ASSESSMENT /SUBJECTIVE:    Today's INR result of 2.0 is therapeutic. Goal INR of 2.0-3.0      Warfarin dose taken: Warfarin taken as instructed    Diet: No new diet changes affecting INR    Medication changes/ interactions: No new medications/supplements affecting INR    Previous INR: Therapeutic     S/S of bleeding or thromboembolism: No    New injury or illness: No    Upcoming surgery, procedure or cardioversion: Yes: pt is having a penile dorsal slit on . Per pt, he needs to be off warfarin for 5 days prior to the procedure. Dr. Rosado is doing the procedure ([facility]).    Additional findings: None      PLAN:    Telephone call with home care nurse Melany regarding INR result and instructed:     Warfarin Dosing Instructions: Continue your current warfarin dose 5mg every Wed; 2.5mg all other days    Instructed patient to follow up no later than: 2 weeks  Orders given to  Homecare nurse/facility to recheck    Education provided: Target INR goal and significance of current INR result      Melany verbalizes understanding and agrees to warfarin dosing plan.    Instructed to call the Anticoagulation Clinic for any changes, questions or concerns. (#[phone])        Vinny Oliveira RN      OBJECTIVE:  Recent labs: (last 7 days)     21   INR 2.6* 2.0*         No question data found.  Anticoagulation Summary  As of 2021    INR goal:  2.0-3.0   TTR:  84.7 % (1 y)   INR used for dosin.0 (2021)   Warfarin maintenance plan:  5 mg (2.5 mg x 2) every Wed; 2.5 mg (2.5 mg x 1) all other days   Full warfarin instructions:  5 mg every Wed; 2.5 mg all other days   Weekly warfarin total:  20 mg   No change documented:  Vinny Oliveira RN   Plan last modified:  Aranza Hernandes RN (2021)   Next INR check:  2021   Priority:  Maintenance   Target end date:  2021    Indications    Chronic atrial  fibrillation (H) [I48.20]             Anticoagulation Episode Summary     INR check location:      Preferred lab:  EXTERNAL LAB    Send INR reminders to:  DELIO ISRAEL    Comments:  Home Care      Anticoagulation Care Providers     Provider Role Specialty Phone number    Ry Shukla MD Referring Family Medicine [phone]    Mary Beth Hoyt DO Referring Family Medicine [phone]

## 2021-05-13 ENCOUNTER — OFFICE VISIT (OUTPATIENT)
Dept: FAMILY MEDICINE | Facility: CLINIC | Age: 86
End: 2021-05-13
Payer: COMMERCIAL

## 2021-05-13 VITALS
WEIGHT: 235 LBS | HEART RATE: 67 BPM | DIASTOLIC BLOOD PRESSURE: 76 MMHG | SYSTOLIC BLOOD PRESSURE: 128 MMHG | OXYGEN SATURATION: 100 % | RESPIRATION RATE: 20 BRPM | BODY MASS INDEX: 33.64 KG/M2 | TEMPERATURE: 97.5 F | HEIGHT: 70 IN

## 2021-05-13 DIAGNOSIS — G24.3 SPASMODIC TORTICOLLIS: Primary | ICD-10-CM

## 2021-05-13 DIAGNOSIS — E66.01 MORBID OBESITY (H): ICD-10-CM

## 2021-05-13 PROCEDURE — 99214 OFFICE O/P EST MOD 30 MIN: CPT | Performed by: FAMILY MEDICINE

## 2021-05-13 ASSESSMENT — MIFFLIN-ST. JEOR: SCORE: 1767.2

## 2021-05-13 ASSESSMENT — PAIN SCALES - GENERAL: PAINLEVEL: MODERATE PAIN (5)

## 2021-05-13 NOTE — PROGRESS NOTES
"    Assessment & Plan     Spasmodic torticollis  We will have the patient use heat and over-the-counter topical products.  As he cannot ambulate well and needs significant help leaving his home I have ordered home care for home PT  - HOME CARE NURSING REFERRAL    Morbid obesity (H)  Monitor his diet        30 minutes spent on the date of the encounter doing chart review, patient visit and documentation          Return in about 3 months (around 8/13/2021) for BP Recheck.    Mary Beth Hoyt DO  [facility]    Juan Jose Wood is a 85 year old who presents for the following health issues  accompanied by his daughter:    HPI     Neck Pain  Onset/Duration: couple months  Description:   Location: Left side  Radiation: into head- \"feels like something pops\"  Intensity: mild, moderate, severe  Progression of Symptoms:  worsening  Accompanying Signs & Symptoms:  Burning, tingling, prickly sensation in arm(s): no  Numbness in arm(s): YES- history of neuropathy  Weakness in arm(s):  no  Fever: no  Headache: YES- more sinus headache  Nausea and/or vomiting: no  History:   Trauma: no  Previous neck pain: no  Previous surgery or injections: no  Previous Imaging (MRI,X ray): no  Precipitating or alleviating factors: None  Does movement impact the pain:  YES  Therapies tried and outcome: heat- no relief        Review of Systems   Constitutional, HEENT, cardiovascular, pulmonary, gi and gu systems are negative, except as otherwise noted.      Objective    /76 (BP Location: Right arm, Patient Position: Sitting, Cuff Size: Adult Large)   Pulse 67   Temp 97.5  F (36.4  C) (Oral)   Resp 20   Ht 1.778 m (5' 10\")   Wt 106.6 kg (235 lb)   SpO2 100%   BMI 33.72 kg/m    Body mass index is 33.72 kg/m .  Physical Exam   GENERAL: healthy, alert and no distress  NECK: no adenopathy, no asymmetry, masses, or scars and thyroid normal to palpation  RESP: lungs clear to auscultation - no rales, rhonchi or " wheezes  CV: regular rate and rhythm, normal S1 S2, no S3 or S4, no murmur, click or rub, no peripheral edema and peripheral pulses strong  MS: neck exam shows decreased range of motion to all planes except flexion normal.  Paraspinal muscle spasm throughout  SKIN: no suspicious lesions or rashes  PSYCH: mentation appears normal, affect normal/bright

## 2021-05-14 ENCOUNTER — TELEPHONE (OUTPATIENT)
Dept: FAMILY MEDICINE | Facility: CLINIC | Age: 86
End: 2021-05-14

## 2021-05-14 NOTE — TELEPHONE ENCOUNTER
Routing to PCP- see phone message    Per visit note from yesterday    Spasmodic torticollis  We will have the patient use heat and over-the-counter topical products.  As he cannot ambulate well and needs significant help leaving his home I have ordered home care for home PT  - HOME CARE NURSING REFERRAL    Wendi Garrett RN

## 2021-05-14 NOTE — TELEPHONE ENCOUNTER
RN left voicemail for patient requesting a call back to the clinic at [phone].    Called to see if there is another home care agency involved    Ga Davison RN, BSN, PHN  [facility]

## 2021-05-14 NOTE — TELEPHONE ENCOUNTER
The patient's daughter spoke of a different agency.  Please contact the patient to determine if there is another home care agency they already associated with    Mary Beth Hoyt DO

## 2021-05-14 NOTE — TELEPHONE ENCOUNTER
Patient is established with [facility]. Faxed referral to [facility] at [phone]    Called Jocelyn with [facility] and advised that we are sending this referral to patient's current home health agency.    Shun De La Garza

## 2021-05-14 NOTE — TELEPHONE ENCOUNTER
Patient was referred for Home Care services however, they cannot do the start of care until May 26 due to their capacity.  Would you like to refer to another agency or is the delay okay?     Ok to LM with Jocelyn  ext [phone]    Halie Rico/  [place]

## 2021-05-18 ENCOUNTER — TELEPHONE (OUTPATIENT)
Dept: FAMILY MEDICINE | Facility: CLINIC | Age: 86
End: 2021-05-18

## 2021-05-18 NOTE — TELEPHONE ENCOUNTER
Forms received from [facility]/ Revision to Plan of Care # [id number] - SN to INR 2.0 on 5/12/21 (cert period: 4/8/21 to 6/6/21) for Mary Beth Hoyt DO.  Forms placed in provider 'sign me' folder.  Please fax forms to [phone] after completion.    Manny Benson RT (R) (ARRT)  Radiology Dept

## 2021-05-20 ENCOUNTER — TELEPHONE (OUTPATIENT)
Dept: FAMILY MEDICINE | Facility: CLINIC | Age: 86
End: 2021-05-20

## 2021-05-20 NOTE — TELEPHONE ENCOUNTER
Forms received from [facility]/ Revision to Plan of Care # [id number] (cert period: 4/8/21 to 6/6/21) for Mary Beth Hoyt DO.  Forms placed in provider 'sign me' folder.  Please fax forms to [phone] after completion.    Manny Benson RT (R) (ARRT)  Radiology Dept

## 2021-05-24 NOTE — TELEPHONE ENCOUNTER
MAT-PROCEDURAL ANTICOAGULATION  MANAGEMENT    Assessment     Warfarin interruption plan for penile dorsal slit on 2021.    A. Fib      Risk stratification for thromboembolism: low (2017 ACC periprocedure pathway for NVAF Expert Consensus)      [id number] = 4 (HTN, Age ++, CHF)    NVAF: 2017 ACC periprocedure pathway for NVAF advises NO bridge for low risk stratification ([id number] score <=4 or and no prior hx of stroke, TIA or systemic embolism)    Plan       Pre-Procedure:    Hold warfarin until after procedure startin2021        Post-Procedure:    Resume home warfarin dose if okay with provider doing procedure on night of procedure, 2021 PM: 7.5mg    Recheck INR 7 days after resuming warfarin   ?   Roya Munoz [facility]    Subjective/Objective:      Israel Moyer, a 85 year old male    Reason for Anticoagulation: A. Fib    Goal INR Range: 2.0-3.0    Patient bridged in past: yes  at [facility],  when inpatient until PE R/O    Pertinent History: patient has refused Lovenox more recently    Wt Readings from Last 3 Encounters:   21 106.6 kg (235 lb)   21 104.3 kg (230 lb)   21 103.9 kg (229 lb)        Ideal body weight: 73 kg (160 lb 15 oz)  Adjusted ideal body weight: 86.4 kg (190 lb 9 oz)     Lab Results   Component Value Date    INR 2.0 (A) 2021    INR 2.6 (A) 2021    INR 4.0 (A) 2021     Lab Results   Component Value Date    HGB 11.1 2020    HCT 35.6 2020     2020     Lab Results   Component Value Date    CR 1.42 (H) 2021    CR 1.52 (H) 2020    CR 1.59 (H) 2019     CrCl cannot be calculated (Patient's most recent lab result is older than the maximum 30 days allowed.).

## 2021-05-26 NOTE — TELEPHONE ENCOUNTER
Called home care [facility] ([phone]) but VM box is full.    Need to give warfarin hold orders.

## 2021-05-27 ENCOUNTER — ANTICOAGULATION THERAPY VISIT (OUTPATIENT)
Dept: FAMILY MEDICINE | Facility: CLINIC | Age: 86
End: 2021-05-27

## 2021-05-27 DIAGNOSIS — I48.20 CHRONIC ATRIAL FIBRILLATION (H): ICD-10-CM

## 2021-05-27 LAB — INR PPP: 2.9 (ref 0.9–1.1)

## 2021-05-27 NOTE — PROGRESS NOTES
ANTICOAGULATION MANAGEMENT     Patient Name:  Israel Moyer  Date:  2021    ASSESSMENT /SUBJECTIVE:    Today's INR result of 2.9 is therapeutic. Goal INR of 2.0-3.0      Warfarin dose taken: Warfarin taken as instructed    Diet: No new diet changes affecting INR    Medication changes/ interactions: No new medications/supplements affecting INR    Previous INR: Therapeutic     S/S of bleeding or thromboembolism: No    New injury or illness: No    Upcoming surgery, procedure or cardioversion: No    Additional findings: None      PLAN:    Telephone call with home care nurse Melany regarding INR result and instructed:     Warfarin Dosing Instructions: Continue your current warfarin dose 5 mg on wed and 2.5 mg all other days    Instructed patient to follow up no later than: 3 weeks  Orders given to  Homecare nurse/facility to recheck    Education provided: None required      Israel verbalizes understanding and agrees to warfarin dosing plan.    Instructed to call the Anticoagulation Clinic for any changes, questions or concerns. (#[phone])        Maricruz Chao RN      OBJECTIVE:  Recent labs: (last 7 days)     21   INR 2.9*         No question data found.  Anticoagulation Summary  As of 2021    INR goal:  2.0-3.0   TTR:  84.7 % (1 y)   INR used for dosin.9 (2021)   Warfarin maintenance plan:  5 mg (2.5 mg x 2) every Wed; 2.5 mg (2.5 mg x 1) all other days   Full warfarin instructions:  : Hold; : Hold; : Hold; : Hold; : Hold; : 7.5 mg; Otherwise 5 mg every Wed; 2.5 mg all other days   Weekly warfarin total:  20 mg   No change documented:  Maricruz hCao RN   Plan last modified:  Aranza Hernandes RN (2021)   Next INR check:  2021   Priority:  Maintenance   Target end date:  2021    Indications    Chronic atrial fibrillation (H) [I48.20]             Anticoagulation Episode Summary     INR check location:      Preferred lab:  EXTERNAL LAB    Send INR  reminders to:  DELIO ISRAEL    Comments:  Home Care      Anticoagulation Care Providers     Provider Role Specialty Phone number    Ry Shukla MD Referring Family Medicine [phone]    Mary Beth Hoyt DO Referring Family Medicine [phone]

## 2021-05-28 ENCOUNTER — TELEPHONE (OUTPATIENT)
Dept: FAMILY MEDICINE | Facility: CLINIC | Age: 86
End: 2021-05-28

## 2021-05-28 NOTE — TELEPHONE ENCOUNTER
Forms received from [facility]/Revision to Plan of Care/[id number]/ Cert. Period: 4/8/2021-6/6-2021 for Mary Beth Hoyt DO.  Forms placed in provider 'sign me' folder.  Please fax forms to [phone] after completion.    SHAHIDA BLAS (R)  Radiology Department

## 2021-06-01 ENCOUNTER — MEDICAL CORRESPONDENCE (OUTPATIENT)
Dept: HEALTH INFORMATION MANAGEMENT | Facility: CLINIC | Age: 86
End: 2021-06-01

## 2021-06-03 ENCOUNTER — TELEPHONE (OUTPATIENT)
Dept: FAMILY MEDICINE | Facility: CLINIC | Age: 86
End: 2021-06-03

## 2021-06-03 NOTE — TELEPHONE ENCOUNTER
Routing to PCP to review and approve verbal orders as requested in below message.    Mari Kaur, RN, BSN, PHN  [facility]

## 2021-06-03 NOTE — TELEPHONE ENCOUNTER
Reason for Call:  Other     Detailed comments: Requesting home care orders skilled nursing one time a week for nine weeks and a home aide two times a week for nine weeks    Phone Number   [facility] [phone]         Best Time:     Can we leave a detailed message on this number? YES    Call taken on 6/3/2021 at 2:32 PM by Ramandeep Lentz

## 2021-06-10 DIAGNOSIS — Z79.01 LONG TERM CURRENT USE OF ANTICOAGULANT THERAPY: ICD-10-CM

## 2021-06-11 RX ORDER — WARFARIN SODIUM 2.5 MG/1
TABLET ORAL
Qty: 102 TABLET | Refills: 0 | Status: SHIPPED | OUTPATIENT
Start: 2021-06-11 | End: 2021-10-15

## 2021-06-14 ENCOUNTER — TELEPHONE (OUTPATIENT)
Dept: FAMILY MEDICINE | Facility: CLINIC | Age: 86
End: 2021-06-14

## 2021-06-14 NOTE — TELEPHONE ENCOUNTER
Forms received from [facility] / Home Health Cert (06/07/21-08/05/21)  for Mary Beth Hoyt .  Forms placed in provider 'sign me' folder.  Please fax forms to [phone] after completion.    Rut Cole

## 2021-06-16 ENCOUNTER — ANTICOAGULATION THERAPY VISIT (OUTPATIENT)
Dept: FAMILY MEDICINE | Facility: CLINIC | Age: 86
End: 2021-06-16

## 2021-06-16 DIAGNOSIS — I48.20 CHRONIC ATRIAL FIBRILLATION (H): ICD-10-CM

## 2021-06-16 LAB — INR PPP: 2.3 (ref 0.9–1.1)

## 2021-06-16 NOTE — PROGRESS NOTES
ANTICOAGULATION MANAGEMENT     Patient Name:  Israel Moyer  Date:  2021    ASSESSMENT /SUBJECTIVE:    Today's INR result of 2.3 is therapeutic. Goal INR of 2.0-3.0      Warfarin dose taken: Warfarin taken as instructed    Diet: No new diet changes affecting INR    Medication changes/ interactions: No new medications/supplements affecting INR    Previous INR: Therapeutic     S/S of bleeding or thromboembolism: No    New injury or illness: No    Upcoming surgery, procedure or cardioversion: Yes: procedure on , hold instructions given to home care nurse and patient    Additional findings: None      PLAN:    Warfarin Dosing Instructions: Continue your current warfarin dose 5 mg on wed and 2.5 mg all other days    Instructed patient to follow up no later than:   Orders given to  Homecare nurse/facility to recheck    Education provided: None required    Telephone call with home care nurse Melany whom verbalizes understanding and agrees to plan    Instructed to call the Anticoagulation Clinic for any changes, questions or concerns. (#[phone])        Maricruz Chao RN      OBJECTIVE:  Recent labs: (last 7 days)     21   INR 2.3*         No question data found.  Anticoagulation Summary  As of 2021    INR goal:  2.0-3.0   TTR:  84.7 % (1 y)   INR used for dosin.3 (2021)   Warfarin maintenance plan:  5 mg (2.5 mg x 2) every Wed; 2.5 mg (2.5 mg x 1) all other days   Full warfarin instructions:  : Hold; : Hold; : Hold; : Hold; : Hold; : 7.5 mg; Otherwise 5 mg every Wed; 2.5 mg all other days   Weekly warfarin total:  20 mg   No change documented:  Maricruz Chao RN   Plan last modified:  Aranza Hernandes RN (2021)   Next INR check:  2021   Priority:  Maintenance   Target end date:  2021    Indications    Chronic atrial fibrillation (H) [I48.20]             Anticoagulation Episode Summary     INR check location:      Preferred lab:  EXTERNAL LAB     Send INR reminders to:  DELIO ISRAEL    Comments:  Home Care      Anticoagulation Care Providers     Provider Role Specialty Phone number    Ry Shukla MD Referring Family Medicine [phone]    Mary Beth Hoyt DO Referring Family Medicine [phone]

## 2021-06-17 ENCOUNTER — TELEPHONE (OUTPATIENT)
Dept: FAMILY MEDICINE | Facility: CLINIC | Age: 86
End: 2021-06-17

## 2021-06-17 NOTE — TELEPHONE ENCOUNTER
Forms received from "[employer]./Revision to Plan of Care/ Cert. Period: 6/7/2021-8/5/2021/# [id number] for Mary Beth Hoyt DO.  Forms placed in provider 'sign me' folder.  Please fax forms to [phone] after completion.    SHAHIDA BLAS (R)  Radiology Department

## 2021-06-18 ENCOUNTER — TELEPHONE (OUTPATIENT)
Dept: FAMILY MEDICINE | Facility: CLINIC | Age: 86
End: 2021-06-18

## 2021-06-18 ENCOUNTER — MEDICAL CORRESPONDENCE (OUTPATIENT)
Dept: HEALTH INFORMATION MANAGEMENT | Facility: CLINIC | Age: 86
End: 2021-06-18

## 2021-06-18 NOTE — TELEPHONE ENCOUNTER
Forms received from [facility]/Revision to Plan of Care/Cert. 4/8/2021-6/6/2021/ # [id number] for Mary Beth Hoyt DO.  Forms placed in provider 'sign me' folder.  Please fax forms to [phone] after completion.    SHAHIDA BLAS (R)  Radiology Department

## 2021-06-21 ENCOUNTER — TELEPHONE (OUTPATIENT)
Dept: FAMILY MEDICINE | Facility: CLINIC | Age: 86
End: 2021-06-21

## 2021-06-21 DIAGNOSIS — I50.9 CONGESTIVE HEART FAILURE, UNSPECIFIED HF CHRONICITY, UNSPECIFIED HEART FAILURE TYPE (H): ICD-10-CM

## 2021-06-21 DIAGNOSIS — Z79.01 LONG TERM CURRENT USE OF ANTICOAGULANT THERAPY: ICD-10-CM

## 2021-06-21 DIAGNOSIS — I87.2 VENOUS STASIS DERMATITIS OF BOTH LOWER EXTREMITIES: Primary | ICD-10-CM

## 2021-06-21 DIAGNOSIS — I73.9 PVD (PERIPHERAL VASCULAR DISEASE) (H): ICD-10-CM

## 2021-06-21 DIAGNOSIS — G24.3 SPASMODIC TORTICOLLIS: ICD-10-CM

## 2021-06-21 NOTE — TELEPHONE ENCOUNTER
RN spoke with patients daughter Ngozi.  Consent to communicate on file.     Ngozi would like call back when resolved.  [phone]     Daughter called stating Brianna had made an appointment for tomorrow for home care to begin home PT.  Daughter stated Patient had just had an appointment in May and patient does not need one.  Referral had been placed to  [place] home care, however, patient had changed his mind and wants to go [facility]. Daughter is unavailable at the appointment time tomorrow.     RN advised she would contact [facility] to discuss why the need appointment and if current referral is ok or what are the next steps.    RN called and left VM for SIXTO Mosley Case Manager with [facility], requesting call back to clinic to further discuss need for appointment and new referral or can current be faxed to them.    Appointment not cancelled at this time      Ga Davison, RN, BSN, PHN  [facility]

## 2021-06-22 ENCOUNTER — TELEPHONE (OUTPATIENT)
Dept: FAMILY MEDICINE | Facility: CLINIC | Age: 86
End: 2021-06-22

## 2021-06-22 NOTE — TELEPHONE ENCOUNTER
Forms received from [facility]/ Revision To Plan of Care # [id number], [facility] and Plan of Care # [id number], Revision to Plan of Care # [id number] (cert period 5/25/21 to 7/23/21) for Mary Beth Hoyt DO.  Forms placed in provider 'sign me' folder.  Please fax forms to [phone] after completion.    Manny Benson RT (R) (ARRT)  Radiology Dept

## 2021-06-22 NOTE — TELEPHONE ENCOUNTER
RN spoke with Marya at [facility].    RN discussed with Marya Daughter questioning why patient needs a video visit scheduled for 3:20 today for Home Care Services.  Patient just had in person clinic appointment on May 13th.    Rn seeking clarification as to why appointment is needed.  Is it outside of a certain window need to begin home care?  Does the May 13th appointment not qualify him?    Marya stated she would send communication out to determine need for appointment.      Ga Davison, RN, BSN, PHN  [facility]    .

## 2021-06-22 NOTE — TELEPHONE ENCOUNTER
Routing to PCP?NE provider charles Soriano needs new home care referral.    Referral pended.    Once referral complete, Please fax to [facility] along with 5- office visit note. At  [phone]  Attention Jaylen bocanegra        RN spoke with patients Dhiraj Melvin.    RN advised she had not heard back from Home Care Regarding need for appointment.    Daughter stated she is unable to be with her father today so appointment would need to reschedule.    RN stated she would wait to hear from Home Care        RN then received call from [facility].  Vinh explained a visit was needed due to flip in payors.    RN advised that a visit had been completed on 5-13 and referral placed.  RN reviewed documentation.    Shital advised to fax progress note and new referral to them to Jaylen bocanegra .

## 2021-06-23 NOTE — TELEPHONE ENCOUNTER
TC, please fax referral to [facility] home care along with 5- office visit note to  [phone]  Attention Jaylen intake    Mari Kaur, RN, BSN, PHN  [facility] [place]

## 2021-06-28 ENCOUNTER — OFFICE VISIT (OUTPATIENT)
Dept: UROLOGY | Facility: CLINIC | Age: 86
End: 2021-06-28
Payer: COMMERCIAL

## 2021-06-28 DIAGNOSIS — N47.1 PHIMOSIS: Primary | ICD-10-CM

## 2021-06-28 PROCEDURE — 54001 SLITTING OF PREPUCE: CPT | Performed by: UROLOGY

## 2021-07-06 ENCOUNTER — ANTICOAGULATION THERAPY VISIT (OUTPATIENT)
Dept: FAMILY MEDICINE | Facility: CLINIC | Age: 86
End: 2021-07-06

## 2021-07-06 DIAGNOSIS — I48.20 CHRONIC ATRIAL FIBRILLATION (H): ICD-10-CM

## 2021-07-06 LAB — INR PPP: 2.3 (ref 0.9–1.1)

## 2021-07-06 NOTE — PROGRESS NOTES
ANTICOAGULATION MANAGEMENT     Israel Moyer 85 year old male is on warfarin with therapeutic INR result. (Goal INR 2.0-3.0)    Recent labs: (last 7 days)     07/06/21   INR 2.3*       ASSESSMENT     Source(s): Home Care/Facility Nurse       Warfarin doses taken: Warfarin taken as instructed    Diet: No new diet changes identified    New illness, injury, or hospitalization: No    Medication/supplement changes: None noted    Signs or symptoms of bleeding or clotting: No    Previous INR: Therapeutic last 2(+) visits    Additional findings: None     PLAN     Recommended plan for no diet, medication or health factor changes affecting INR     Dosing Instructions: Continue your current warfarin dose with next INR in 2 weeks       Summary  As of 7/6/2021    Full warfarin instructions:  5 mg every Wed; 2.5 mg all other days   Next INR check:  7/22/2021             Telephone call with Israel who verbalizes understanding and agrees to plan    Orders given to  Homecare nurse/facility to recheck    Education provided: Target INR goal and significance of current INR result    Plan made per [facility] anticoagulation protocol    Vinny Oliveira RN  Anticoagulation Clinic  7/6/2021    _______________________________________________________________________     Anticoagulation Episode Summary     Current INR goal:  2.0-3.0   TTR:  84.7 % (1 y)   Target end date:  7/27/2021   Send INR reminders to:  DELIO ISRAEL    Indications    Chronic atrial fibrillation (H) [I48.20]           Comments:  Home Care         Anticoagulation Care Providers     Provider Role Specialty Phone number    Ry Shukla MD Referring Family Medicine [phone]    Mary Beth Hoyt DO Referring Family Medicine [phone]

## 2021-07-07 ENCOUNTER — TELEPHONE (OUTPATIENT)
Dept: FAMILY MEDICINE | Facility: CLINIC | Age: 86
End: 2021-07-07

## 2021-07-07 NOTE — TELEPHONE ENCOUNTER
Forms received from [facility]/ Revision to plan of care - Order #[id number]  (certification period 05/25/21 to 07/23/21)  for Mary Beth Hoyt DO.  Forms placed in provider 'sign me' folder.  Please fax forms to [phone] after completion.    Rut Cole

## 2021-07-07 NOTE — TELEPHONE ENCOUNTER
Forms received from [facility]/ Physician attestation of Face to Face Encounter for [place] [facility] Patient receiving Home Care  for Mary Beth Hoyt DO.  Forms placed in provider 'sign me' folder.  Please fax forms to [phone] after completion.    Rut Cole

## 2021-07-07 NOTE — TELEPHONE ENCOUNTER
Forms received from [facility]/ Home Health Cert *[id number]* (07/02/21 to 08/30/21)    and also 3 Revisions of Care  [id number]; [id number]; [id number]  for Mary Beth Garciaaniket LAUREN.  Forms placed in provider 'sign me' folder.  Please fax forms to [phone] after completion.    Rut Cole

## 2021-07-08 DIAGNOSIS — Z53.9 DIAGNOSIS NOT YET DEFINED: Primary | ICD-10-CM

## 2021-07-08 PROCEDURE — 99207 PR MD CERTIFICATION HHA PATIENT: CPT | Performed by: FAMILY MEDICINE

## 2021-07-09 ENCOUNTER — MEDICAL CORRESPONDENCE (OUTPATIENT)
Dept: HEALTH INFORMATION MANAGEMENT | Facility: CLINIC | Age: 86
End: 2021-07-09
Payer: COMMERCIAL

## 2021-07-19 DIAGNOSIS — I10 HYPERTENSION GOAL BP (BLOOD PRESSURE) < 140/90: ICD-10-CM

## 2021-07-19 RX ORDER — FUROSEMIDE 40 MG
40 TABLET ORAL DAILY
Qty: 30 TABLET | Refills: 0 | Status: SHIPPED | OUTPATIENT
Start: 2021-07-19 | End: 2021-10-18

## 2021-07-19 NOTE — TELEPHONE ENCOUNTER
Routing refill request to provider for review/approval because:  Labs out of range:    Creatinine   Date Value Ref Range Status   01/22/2021 1.42 (H) 0.66 - 1.25 mg/dL Final     Wendi Garrett RN

## 2021-07-22 ENCOUNTER — TELEPHONE (OUTPATIENT)
Dept: FAMILY MEDICINE | Facility: CLINIC | Age: 86
End: 2021-07-22
Payer: COMMERCIAL

## 2021-07-22 DIAGNOSIS — I48.20 CHRONIC ATRIAL FIBRILLATION (H): Primary | ICD-10-CM

## 2021-07-22 LAB — INR POINT OF CARE: 3.8 (ref 0.9–1.1)

## 2021-07-22 PROCEDURE — 85610 PROTHROMBIN TIME: CPT | Performed by: FAMILY MEDICINE

## 2021-07-22 PROCEDURE — 36416 COLLJ CAPILLARY BLOOD SPEC: CPT | Performed by: FAMILY MEDICINE

## 2021-07-22 NOTE — TELEPHONE ENCOUNTER
Attempted to reach Melany  RN, no answer. Left another message reiterating plan below.     Contacted patient to be sure he was aware of today's dosing plan. Patient denies any major injury from yesterday's fall except some minor abrasions on his  knees. Feeling fine otherwise. Patient denies any changes to diet or medications. Writer advised patient to hold tomorrow's warfarin dose as he already took today's. Then resume maintenance dose with a recheck next Thursday with next HC visit. Patient verbalized understanding.     Rosanna Callejas, RICKN, RN

## 2021-07-22 NOTE — TELEPHONE ENCOUNTER
Melany HC nurse reporting INR of 3.8 today.    Please call HC nurse back at [phone]      Thanks! Amy Santizo RN

## 2021-07-22 NOTE — TELEPHONE ENCOUNTER
"ANTICOAGULATION MANAGEMENT     Israel Moyer 85 year old male is on warfarin with supratherapeutic INR result. (Goal INR 2.0-3.0)    7/22/21 INR result reported: 3.8    ASSESSMENT     Source(s): Chart Review and Patient/Caregiver Call       Warfarin doses taken: Warfarin taken as instructed    Diet: Unable to assess    New illness, injury, or hospitalization: Yes: Patient seen in the ER yesterday. \"Patient slip and fall at his independent living facility. Patient states he was in the kitchen when he slipped. He denies hitting his head or losing consciousness. EMS notes abrasions to the patient's bilateral knees and toes. Patient reports pain at 5/10 in severity. He denies headache, vomiting, abdominal pain. Impression and Plan: Patient presented here hypotensive. Patient was given IV fluids and now has a systolic blood pressure in the 110's-120's. His laboratory workup shows acute kidney injury with evidence of dehydration. The patient's imaging studies were unremarkable and other labs were clear. I discussed with patient hospitalization. He declined this and stated he wants to go home. I recommended he follow up in 2 days with primary for repeat kidney function testing. He is to return her for any new ore worsening symptoms.\"    Medication/supplement changes: None noted per chart review    Signs or symptoms of bleeding or clotting: Unable to assess    Previous INR: Therapeutic last 2(+) visits    Additional findings: None     PLAN     Recommended plan for temporary change(s) affecting INR     Dosing Instructions: Hold dose then continue your current warfarin dose 5 mg every Wed; 2.5 mg all other days with next INR in 1 week         Detailed voice message left for home care nurse Melany with dosing instructions and follow up date.  [facility] BRUCE Mosley.    Orders given to  Homecare nurse/facility to recheck    Education provided: Please call back if any changes to your diet, medications or how you've been " taking warfarin, Monitoring for bleeding signs and symptoms, Monitoring for clotting signs and symptoms and Importance of notifying clinic for changes in medications; a sooner lab recheck maybe needed.    Plan made per [facility] anticoagulation protocol    Rosanna Callejas RN  Anticoagulation Clinic  7/22/2021    _______________________________________________________________________

## 2021-07-26 ENCOUNTER — TELEPHONE (OUTPATIENT)
Dept: FAMILY MEDICINE | Facility: CLINIC | Age: 86
End: 2021-07-26

## 2021-07-26 NOTE — TELEPHONE ENCOUNTER
Forms received from [facility]/ Revision to Plan of Care #[id number] (cert period: 7/2/21 to 8/30/21) for Mary Beth Hoyt DO.  Forms placed in provider 'sign me' folder.  Please fax forms to [phone] after completion.    Manny Benson RT (R) (ARRT)  Radiology Dept

## 2021-07-29 ENCOUNTER — ANTICOAGULATION THERAPY VISIT (OUTPATIENT)
Dept: FAMILY MEDICINE | Facility: CLINIC | Age: 86
End: 2021-07-29

## 2021-07-29 DIAGNOSIS — I48.20 CHRONIC ATRIAL FIBRILLATION (H): Primary | ICD-10-CM

## 2021-07-29 LAB — INR (EXTERNAL): 2.8 (ref 0.9–1.1)

## 2021-07-29 NOTE — PROGRESS NOTES
ANTICOAGULATION MANAGEMENT     Israel Moyer 85 year old male is on warfarin with therapeutic INR result. (Goal INR 2.0-3.0)    Recent labs: (last 7 days)     07/29/21  1050   INR 2.8*       ASSESSMENT     Source(s): Chart Review and Home Care/Facility Nurse       Warfarin doses taken: Warfarin taken as instructed    Diet: No new diet changes identified    New illness, injury, or hospitalization: No    Medication/supplement changes: None noted    Signs or symptoms of bleeding or clotting: No    Previous INR: Supratherapeutic    Additional findings: None     PLAN     Recommended plan for no diet, medication or health factor changes affecting INR     Dosing Instructions: Continue your current warfarin dose with next INR in 1 week       Summary  As of 7/29/2021    Full warfarin instructions:  5 mg every Wed; 2.5 mg all other days   Next INR check:  8/5/2021             Telephone call with home care nurse Melany who verbalizes understanding and agrees to plan    Orders given to  Homecare nurse/facility to recheck    Education provided: Target INR goal and significance of current INR result    Plan made per [facility] anticoagulation protocol    Vinny Oliveira RN  Anticoagulation Clinic  7/29/2021    _______________________________________________________________________     Anticoagulation Episode Summary     Current INR goal:  2.0-3.0   TTR:  80.8 % (1 y)   Target end date:  7/27/2021   Send INR reminders to:  DELIO ISRAEL    Indications    Chronic atrial fibrillation (H) [I48.20]           Comments:  Home Care         Anticoagulation Care Providers     Provider Role Specialty Phone number    Ry Shukla MD Referring Family Medicine [phone]    Mary Beth Hoyt DO Referring Family Medicine [phone]

## 2021-07-30 ENCOUNTER — TELEPHONE (OUTPATIENT)
Dept: FAMILY MEDICINE | Facility: CLINIC | Age: 86
End: 2021-07-30

## 2021-08-05 ENCOUNTER — ANTICOAGULATION THERAPY VISIT (OUTPATIENT)
Dept: ANTICOAGULATION | Facility: CLINIC | Age: 86
End: 2021-08-05

## 2021-08-05 DIAGNOSIS — I48.20 CHRONIC ATRIAL FIBRILLATION (H): Primary | ICD-10-CM

## 2021-08-05 LAB — INR (EXTERNAL): 3.4 (ref 0.9–1.1)

## 2021-08-05 NOTE — PROGRESS NOTES
ANTICOAGULATION MANAGEMENT     Israel Moyer 85 year old male is on warfarin with supratherapeutic INR result. (Goal INR 2.0-3.0)    Recent labs: (last 7 days)     08/05/21  1042   INR 3.4*       ASSESSMENT     Source(s): Chart Review and Home Care/Facility Nurse       Warfarin doses taken: Warfarin taken as instructed    Diet: No new diet changes identified    New illness, injury, or hospitalization: No    Medication/supplement changes: None noted    Signs or symptoms of bleeding or clotting: No    Previous INR: Therapeutic last visit; previously outside of goal range    Additional findings: None -- no identifiable reasons for the high INR. Denies any changes at all.     PLAN     Recommended plan for no diet, medication or health factor changes affecting INR     Dosing Instructions:  Decrease your warfarin dose (12.5% change) with next INR in 1 week       Summary  As of 8/5/2021    Full warfarin instructions:  2.5 mg every day   Next INR check:  8/12/2021             Telephone call with home care nurse Melany who verbalizes understanding and agrees to plan    Orders given to  Homecare nurse/facility to recheck    Education provided: Contact [phone]  with any changes, questions or concerns.     Plan made per [facility] anticoagulation protocol    Jinny Navarro RN  Anticoagulation Clinic  8/5/2021    _______________________________________________________________________     Anticoagulation Episode Summary     Current INR goal:  2.0-3.0   TTR:  79.5 % (1 y)   Target end date:  7/27/2021   Send INR reminders to:  DELIO ISRAEL    Indications    Chronic atrial fibrillation (H) [I48.20]           Comments:  Home Care         Anticoagulation Care Providers     Provider Role Specialty Phone number    Ry Shukla MD Referring Family Medicine [phone]    Mary Beth Hoyt DO Referring Family Medicine [phone]

## 2021-08-06 ENCOUNTER — TRANSFERRED RECORDS (OUTPATIENT)
Dept: HEALTH INFORMATION MANAGEMENT | Facility: CLINIC | Age: 86
End: 2021-08-06

## 2021-08-09 ENCOUNTER — TELEPHONE (OUTPATIENT)
Dept: FAMILY MEDICINE | Facility: CLINIC | Age: 86
End: 2021-08-09

## 2021-08-09 NOTE — TELEPHONE ENCOUNTER
Forms received from [facility]/ Revision to Plan of Care #[id number] (cert period: 7/2/21 to 8/30/21) for Mary Beth Hoyt DO.  Forms placed in provider 'sign me' folder.  Please fax forms to [phone] after completion.    Manny Benson RT (R) (ARRT)  Radiology Dept

## 2021-08-11 NOTE — TELEPHONE ENCOUNTER
Forms signed and faxed to [facility]  At [phone]    Arianna Gamez,   [facility]

## 2021-08-12 ENCOUNTER — ANTICOAGULATION THERAPY VISIT (OUTPATIENT)
Dept: FAMILY MEDICINE | Facility: CLINIC | Age: 86
End: 2021-08-12

## 2021-08-12 DIAGNOSIS — I48.20 CHRONIC ATRIAL FIBRILLATION (H): Primary | ICD-10-CM

## 2021-08-12 LAB — INR (EXTERNAL): 3.3 (ref 0.9–1.1)

## 2021-08-12 NOTE — PROGRESS NOTES
ANTICOAGULATION MANAGEMENT     Israel Moyer 85 year old male is on warfarin with supratherapeutic INR result. (Goal INR 2.0-3.0)    Recent labs: (last 7 days)     08/12/21  1044   INR 3.3*       ASSESSMENT     Source(s): Chart Review and Home Care/Facility Nurse       Warfarin doses taken: Warfarin taken as instructed    Diet: No new diet changes identified    New illness, injury, or hospitalization: No    Medication/supplement changes: None noted    Signs or symptoms of bleeding or clotting: No    Previous INR: Supratherapeutic    Additional findings: None     PLAN     Recommended plan for no diet, medication or health factor changes affecting INR     Dosing Instructions: Hold dose then continue your current warfarin dose with next INR in 1 week        Summary  As of 8/12/2021    Full warfarin instructions:  8/12: Hold; Otherwise 2.5 mg every day   Next INR check:               Telephone call with home care nurse Imer who verbalizes understanding and agrees to plan    Orders given to  Homecare nurse/facility to recheck    Education provided: Please call back if any changes to your diet, medications or how you've been taking warfarin    Plan made per [facility] anticoagulation protocol    Maricruz Chao RN  Anticoagulation Clinic  8/12/2021    _______________________________________________________________________     Anticoagulation Episode Summary     Current INR goal:  2.0-3.0   TTR:  77.6 % (1 y)   Target end date:  7/27/2021   Send INR reminders to:  DELIO ISRAEL    Indications    Chronic atrial fibrillation (H) [I48.20]           Comments:  Home Care         Anticoagulation Care Providers     Provider Role Specialty Phone number    Ry Shukla MD Referring Family Medicine [phone]    Mary Beth Hoyt DO Referring Family Medicine [phone]

## 2021-08-13 ENCOUNTER — TELEPHONE (OUTPATIENT)
Dept: FAMILY MEDICINE | Facility: CLINIC | Age: 86
End: 2021-08-13

## 2021-08-13 NOTE — TELEPHONE ENCOUNTER
[facility]ision to Plan of [facility]/ # [id number]/Cert. Period: 7/2/2021-8/30/2021 for Mary Beth Hoyt DO.  Forms placed in provider 'sign me' folder.  Please fax forms to [phone] after completion.    SHAHIDA BLAS (R)  Radiology Department

## 2021-08-19 ENCOUNTER — ANTICOAGULATION THERAPY VISIT (OUTPATIENT)
Dept: FAMILY MEDICINE | Facility: CLINIC | Age: 86
End: 2021-08-19

## 2021-08-19 DIAGNOSIS — I48.20 CHRONIC ATRIAL FIBRILLATION (H): Primary | ICD-10-CM

## 2021-08-19 LAB — INR (EXTERNAL): 2.3 (ref 2–3)

## 2021-08-19 NOTE — PROGRESS NOTES
ANTICOAGULATION MANAGEMENT     Israel Moyer 85 year old male is on warfarin with therapeutic INR result. (Goal INR 2.0-3.0)    Recent labs: (last 7 days)     08/19/21  1123   INR 2.3       ASSESSMENT     Source(s): Chart Review and Home Care/Facility Nurse       Warfarin doses taken: Warfarin taken as instructed    Diet: No new diet changes identified    New illness, injury, or hospitalization: No    Medication/supplement changes: None noted    Signs or symptoms of bleeding or clotting: No    Previous INR: Supratherapeutic    Additional findings: None     PLAN     Recommended plan for no diet, medication or health factor changes affecting INR     Dosing Instructions: Continue your current warfarin dose with next INR in 1 week   HC nurse and patient though maintenance was 2.5 mg everyday except for weds which was 5 mg. This was discussed and communicated that this was changed on 8/4 due to elevated INRs. HC nurse verbalized understanding.     Summary  As of 8/19/2021    Full warfarin instructions:  2.5 mg every day   Next INR check:               Telephone call with home care nurse Rosanna who verbalizes understanding and agrees to plan and who agrees to plan and repeated back plan correctly    Orders given to  Homecare nurse/facility to recheck Rosanna [phone]    Education provided: Please call back if any changes to your diet, medications or how you've been taking warfarin    Plan made per [facility] anticoagulation protocol    Amy Santizo, RN  Anticoagulation Clinic  8/19/2021    _______________________________________________________________________     Anticoagulation Episode Summary     Current INR goal:  2.0-3.0   TTR:  77.0 % (1 y)   Target end date:  7/27/2021   Send INR reminders to:  DELIO ISRAEL    Indications    Chronic atrial fibrillation (H) [I48.20]           Comments:  Home Care         Anticoagulation Care Providers     Provider Role Specialty Phone number    Ry Shukla MD  Referring Family Medicine [phone]    Mary Beth Hoyt DO Referring Family Medicine [phone]

## 2021-08-26 ENCOUNTER — TELEPHONE (OUTPATIENT)
Dept: FAMILY MEDICINE | Facility: CLINIC | Age: 86
End: 2021-08-26

## 2021-08-26 ENCOUNTER — ANTICOAGULATION THERAPY VISIT (OUTPATIENT)
Dept: FAMILY MEDICINE | Facility: CLINIC | Age: 86
End: 2021-08-26

## 2021-08-26 DIAGNOSIS — I48.20 CHRONIC ATRIAL FIBRILLATION (H): Primary | ICD-10-CM

## 2021-08-26 LAB — INR (EXTERNAL): 2.2 (ref 2–3)

## 2021-08-26 NOTE — TELEPHONE ENCOUNTER
Forms received from [facility]/Revision to Plan of Care/ # [id number]/ Cert. Period: 7/2/2021-8/30/2021 for Mary Beth Hoyt DO.  Forms placed in provider 'sign me' folder.  Please fax forms to [phone] after completion.    SHAHIDA BLAS (R)  Radiology Department

## 2021-08-26 NOTE — PROGRESS NOTES
ANTICOAGULATION MANAGEMENT     Israel Moyer 85 year old male is on warfarin with therapeutic INR result. (Goal INR 2.0-3.0)    Recent labs: (last 7 days)     08/26/21  1022   INR 2.2       ASSESSMENT     Source(s): Chart Review and Home Care/Facility Nurse       Warfarin doses taken: Warfarin taken as instructed    Diet: No new diet changes identified    New illness, injury, or hospitalization: No    Medication/supplement changes: None noted    Signs or symptoms of bleeding or clotting: No    Previous INR: Therapeutic last visit; previously outside of goal range    Additional findings: None     PLAN     Recommended plan for no diet, medication or health factor changes affecting INR     Dosing Instructions: Continue your current warfarin dose with next INR in 1 week       Summary  As of 8/26/2021    Full warfarin instructions:  2.5 mg every day   Next INR check:               Telephone call with home care nurse Melany [phone] who verbalizes understanding and agrees to plan and who agrees to plan and repeated back plan correctly    Orders given to  Homecare nurse/facility to recheck    Education provided: Please call back if any changes to your diet, medications or how you've been taking warfarin    Plan made per [facility] anticoagulation protocol    Amy Santizo, RN  Anticoagulation Clinic  8/26/2021    _______________________________________________________________________     Anticoagulation Episode Summary     Current INR goal:  2.0-3.0   TTR:  77.0 % (1 y)   Target end date:  7/27/2021   Send INR reminders to:  DELIO ISRAEL    Indications    Chronic atrial fibrillation (H) [I48.20]           Comments:  Home Care         Anticoagulation Care Providers     Provider Role Specialty Phone number    Ry Shukla MD Referring Family Medicine [phone]    Mary Beth Hoyt DO Referring Family Medicine [phone]

## 2021-09-01 ENCOUNTER — TELEPHONE (OUTPATIENT)
Dept: FAMILY MEDICINE | Facility: CLINIC | Age: 86
End: 2021-09-01

## 2021-09-01 NOTE — TELEPHONE ENCOUNTER
Forms received from [facility]/ [facility] and Plan Of Care #[id number] (cert period: 8/31/21 to 10/29/21) for Mary Beth Hoyt DO.  Forms placed in provider 'sign me' folder.  Please fax forms to [phone] after completion.    Manny STOCKTON (R) (ARRT)  Radiology Dept

## 2021-09-02 ENCOUNTER — ANTICOAGULATION THERAPY VISIT (OUTPATIENT)
Dept: FAMILY MEDICINE | Facility: CLINIC | Age: 86
End: 2021-09-02

## 2021-09-02 DIAGNOSIS — I48.20 CHRONIC ATRIAL FIBRILLATION (H): Primary | ICD-10-CM

## 2021-09-02 LAB — INR (EXTERNAL): 2.5 (ref 0.9–1.1)

## 2021-09-02 NOTE — PROGRESS NOTES
ANTICOAGULATION MANAGEMENT     Israel Moyer 85 year old male is on warfarin with therapeutic INR result. (Goal INR 2.0-3.0)    Recent labs: (last 7 days)     09/02/21  1019   INR 2.5*       ASSESSMENT     Source(s): Home Care/Facility Nurse       Warfarin doses taken: Warfarin taken as instructed    Diet: No new diet changes identified    New illness, injury, or hospitalization: No    Medication/supplement changes: None noted    Signs or symptoms of bleeding or clotting: No    Previous INR: Therapeutic last 2(+) visits    Additional findings: None     PLAN     Recommended plan for no diet, medication or health factor changes affecting INR     Dosing Instructions: Continue your current warfarin dose with next INR in 2 weeks       Summary  As of 9/2/2021    Full warfarin instructions:  2.5 mg every day   Next INR check:  9/16/2021             Detailed voice message left for home care nurse Melany with dosing instructions and follow up date.     Orders given to  Homecare nurse/facility to recheck    Education provided: Goal range and significance of current result    Plan made per [facility] anticoagulation protocol    Vinny Oliveira RN  Anticoagulation Clinic  9/2/2021    _______________________________________________________________________     Anticoagulation Episode Summary     Current INR goal:  2.0-3.0   TTR:  77.0 % (1 y)   Target end date:  7/27/2021   Send INR reminders to:  DELIO ISRAEL    Indications    Chronic atrial fibrillation (H) [I48.20]           Comments:  Home Care         Anticoagulation Care Providers     Provider Role Specialty Phone number    Ry Shukla MD Referring Family Medicine [phone]    Mary Beth Hoyt DO Referring Family Medicine [phone]

## 2021-09-03 ENCOUNTER — TELEPHONE (OUTPATIENT)
Dept: FAMILY MEDICINE | Facility: CLINIC | Age: 86
End: 2021-09-03

## 2021-09-03 DIAGNOSIS — Z53.9 DIAGNOSIS NOT YET DEFINED: Primary | ICD-10-CM

## 2021-09-03 PROCEDURE — 99207 PR MD RECERTIFICATION HHA PT: CPT | Performed by: FAMILY MEDICINE

## 2021-09-03 NOTE — TELEPHONE ENCOUNTER
Forms received from [facility]/ Revision to Plan of Care/ Cert Period: 8/31/2021-10-29/2021/# [id number] for Mary Beth Hoyt DO.  Forms placed in provider 'sign me' folder.  Please fax forms to [phone] after completion.    SHAHIDA BLAS (R)  Radiology Department

## 2021-09-03 NOTE — TELEPHONE ENCOUNTER
Forms received from [facility]/Revision [facility]/ #[id number]/ Cert. 8/31/2021-10/29/2021 for Mary Beth Hoyt DO.  Forms placed in provider 'sign me' folder.  Please fax forms to [phone] after completion.    SHAHIDA BLAS (R)  Radiology Department

## 2021-09-16 ENCOUNTER — TELEPHONE (OUTPATIENT)
Dept: FAMILY MEDICINE | Facility: CLINIC | Age: 86
End: 2021-09-16

## 2021-09-16 ENCOUNTER — ANTICOAGULATION THERAPY VISIT (OUTPATIENT)
Dept: FAMILY MEDICINE | Facility: CLINIC | Age: 86
End: 2021-09-16

## 2021-09-16 DIAGNOSIS — I48.20 CHRONIC ATRIAL FIBRILLATION (H): Primary | ICD-10-CM

## 2021-09-16 LAB — INR (EXTERNAL): 2.2 (ref 0.9–1.1)

## 2021-09-16 NOTE — PROGRESS NOTES
ANTICOAGULATION MANAGEMENT     Israel Moyer 85 year old male is on warfarin with therapeutic INR result. (Goal INR 2.0-3.0)    Recent labs: (last 7 days)     09/16/21  1103   INR 2.2*       ASSESSMENT     Source(s): Home Care/Facility Nurse       Warfarin doses taken: Warfarin taken as instructed    Diet: No new diet changes identified    New illness, injury, or hospitalization: No    Medication/supplement changes: None noted    Signs or symptoms of bleeding or clotting: No    Previous INR: Therapeutic last 2(+) visits    Additional findings: None     PLAN     Recommended plan for no diet, medication or health factor changes affecting INR     Dosing Instructions: Continue your current warfarin dose with next INR in 3 weeks       Summary  As of 9/16/2021    Full warfarin instructions:  2.5 mg every day   Next INR check:  10/7/2021             Telephone call with home care nurse Melany who verbalizes understanding and agrees to plan    Orders given to  Homecare nurse/facility to recheck        Plan made per [facility] anticoagulation protocol    Vinny Oliveira RN  Anticoagulation Clinic  9/16/2021    _______________________________________________________________________     Anticoagulation Episode Summary     Current INR goal:  2.0-3.0   TTR:  77.0 % (1 y)   Target end date:  Indefinite   Send INR reminders to:  DELIO ISRAEL    Indications    Chronic atrial fibrillation (H) [I48.20]           Comments:  Home Care         Anticoagulation Care Providers     Provider Role Specialty Phone number    Ry Shukla MD Referring Family Medicine [phone]    Mary Beth Hoyt DO Referring Family Medicine [phone]

## 2021-09-16 NOTE — TELEPHONE ENCOUNTER
Routing to PCP    Home Care noticed patients left leg had Edema and was slightly warm today. No other symptoms    Next home care visit is Tuesday        RN called and spoke with Melany boss [facility].    Melany stated when she was out to see patient she noted edema in his left leg and leg was slightly warm.    No redness or any other symptoms such as shortness of breath,fever, chest pain.    Melany advised patient to elevate.  Reviewed S/S of when to call 911 or home care agency.  Patient verbalized understanding of advice from home care RN

## 2021-09-16 NOTE — TELEPHONE ENCOUNTER
Reason for call:  Other   Patient called regarding (reason for call): TRISTEN  Additional comments: [facility] calling stating pt had noted edema in left leg with little bit of warmth, no DBT, advised to put compression and elevate.    Phone number to reach patient:  Melany [phone]    Best Time:  anytime    Can we leave a detailed message on this number?  YES    Travel screening: Not Applicable

## 2021-09-17 NOTE — TELEPHONE ENCOUNTER
The patient is anticoagulated on Coumadin for A. fib.  His last INR was within desired range, on 9/16/2021 INR was 2.2.  Therefore okay to monitor swollen leg.    Mary Beth Hoyt DO

## 2021-09-18 ENCOUNTER — HEALTH MAINTENANCE LETTER (OUTPATIENT)
Age: 86
End: 2021-09-18

## 2021-09-22 ENCOUNTER — TELEPHONE (OUTPATIENT)
Dept: FAMILY MEDICINE | Facility: CLINIC | Age: 86
End: 2021-09-22

## 2021-09-22 NOTE — TELEPHONE ENCOUNTER
Forms received from [facility]/ Revision to Plan of Care (Cert period 08/31/21-10/29/21) [id number]  for Mary Beth Hoyt DO.  Forms placed in provider 'sign me' folder.  Please fax forms to [phone] after completion.    PREETI Tran  [facility]

## 2021-10-07 ENCOUNTER — ANTICOAGULATION THERAPY VISIT (OUTPATIENT)
Dept: ANTICOAGULATION | Facility: CLINIC | Age: 86
End: 2021-10-07

## 2021-10-07 DIAGNOSIS — I48.20 CHRONIC ATRIAL FIBRILLATION (H): Primary | ICD-10-CM

## 2021-10-07 LAB — INR (EXTERNAL): 2.4 (ref 0.9–1.2)

## 2021-10-07 NOTE — PROGRESS NOTES
ANTICOAGULATION MANAGEMENT     Israel Moyer 85 year old male is on warfarin with therapeutic INR result. (Goal INR 2.0-3.0)    Recent labs: (last 7 days)     10/07/21  1056   INR 2.4*       ASSESSMENT     Source(s): Chart Review and Home Care/Facility Nurse       Warfarin doses taken: Warfarin taken as instructed    Diet: No new diet changes identified    New illness, injury, or hospitalization: No    Medication/supplement changes: None noted    Signs or symptoms of bleeding or clotting: No    Previous INR: Therapeutic last 2(+) visits    Additional findings: None     PLAN     Recommended plan for no diet, medication or health factor changes affecting INR     Dosing Instructions: Continue your current warfarin dose with next INR in 4 weeks       Summary  As of 10/7/2021    Full warfarin instructions:  2.5 mg every day   Next INR check:               Telephone call with home care nurse Melany who verbalizes understanding and agrees to plan    Orders given to  Homecare nurse/facility to recheck    Education provided: None required    Plan made per [facility] anticoagulation protocol    Ifrah Rm, RN  Anticoagulation Clinic  10/7/2021    _______________________________________________________________________     Anticoagulation Episode Summary     Current INR goal:  2.0-3.0   TTR:  77.0 % (1 y)   Target end date:  Indefinite   Send INR reminders to:  DELIO ISRAEL    Indications    Chronic atrial fibrillation (H) [I48.20]           Comments:  Home Care         Anticoagulation Care Providers     Provider Role Specialty Phone number    Ry Shukla MD Referring Family Medicine [phone]    Mary Beth Hoyt DO Referring Family Medicine [phone]

## 2021-10-08 ENCOUNTER — TELEPHONE (OUTPATIENT)
Dept: FAMILY MEDICINE | Facility: CLINIC | Age: 86
End: 2021-10-08

## 2021-10-08 ENCOUNTER — NURSE TRIAGE (OUTPATIENT)
Dept: FAMILY MEDICINE | Facility: CLINIC | Age: 86
End: 2021-10-08

## 2021-10-08 NOTE — TELEPHONE ENCOUNTER
"Daughter called on behalf of patient.    Reason for Disposition    SEVERE pain (e.g., excruciating, unable to walk)    Very swollen joint    Answer Assessment - Initial Assessment Questions  1. LOCATION and RADIATION: \"Where is the pain located?\"       Left knee  2. QUALITY: \"What does the pain feel like?\"  (e.g., sharp, dull, aching, burning)      Just says that it hurts like hell.   3. SEVERITY: \"How bad is the pain?\" \"What does it keep you from doing?\"   (Scale 1-10; or mild, moderate, severe)    -  MILD (1-3): doesn't interfere with normal activities     -  MODERATE (4-7): interferes with normal activities (e.g., work or school) or awakens from sleep, limping     -  SEVERE (8-10): excruciating pain, unable to do any normal activities, unable to walk      Moderate pain can walk with severe pain.   4. ONSET: \"When did the pain start?\" \"Does it come and go, or is it there all the time?\"      Started about a week ago.   5. RECURRENT: \"Have you had this pain before?\" If so, ask: \"When, and what happened then?\"      No   6. SETTING: \"Has there been any recent work, exercise or other activity that involved that part of the body?\"       no  7. AGGRAVATING FACTORS: \"What makes the knee pain worse?\" (e.g., walking, climbing stairs, running)      Rest makes the pain better, movement makes the pain worse.   8. ASSOCIATED SYMPTOMS: \"Is there any swelling or redness of the knee?\"      Swelling in the knee.   9. OTHER SYMPTOMS: \"Do you have any other symptoms?\" (e.g., chest pain, difficulty breathing, fever, calf pain)     Foot not aligning right, feels like his knee is dislocating on its own.   10. PREGNANCY: \"Is there any chance you are pregnant?\" \"When was your last menstrual period?\"        n/a    Protocols used: KNEE PAIN-A-[place]    Jocelyn Carney RN on 10/8/2021 at 10:56 AM    "

## 2021-10-08 NOTE — TELEPHONE ENCOUNTER
Forms received from [employer]Revision to Plan of Care/ Cert. Period: 8/31/2021-10/29/2021/ # [id number] for Mary Beth Hoyt DO.  Forms placed in provider 'sign me' folder.  Please fax forms to [phone] after completion.    SHAHIDA BLAS (R)  Radiology Department

## 2021-10-11 ENCOUNTER — TELEPHONE (OUTPATIENT)
Dept: FAMILY MEDICINE | Facility: CLINIC | Age: 86
End: 2021-10-11

## 2021-10-11 NOTE — TELEPHONE ENCOUNTER
Forms received from [facility]/ Revision to Plan of Care #[id number] (cert period: 8/31/21 to 10/29/21) for Mary Beth Hoyt DO.  Forms placed in provider 'sign me' folder.  Please fax forms to [phone] after completion.    Manny Benson RT (R) (ARRT)  Radiology Dept

## 2021-10-15 DIAGNOSIS — I10 HYPERTENSION GOAL BP (BLOOD PRESSURE) < 140/90: ICD-10-CM

## 2021-10-15 DIAGNOSIS — I48.20 CHRONIC ATRIAL FIBRILLATION (H): ICD-10-CM

## 2021-10-15 DIAGNOSIS — Z79.01 LONG TERM CURRENT USE OF ANTICOAGULANT THERAPY: ICD-10-CM

## 2021-10-15 RX ORDER — WARFARIN SODIUM 2.5 MG/1
TABLET ORAL
Qty: 102 TABLET | Refills: 1 | Status: SHIPPED | OUTPATIENT
Start: 2021-10-15

## 2021-10-15 NOTE — TELEPHONE ENCOUNTER
Anticoagulation Monitoring Instructions   Latest Ref Rng & Units    10/7/2021 2.5 mg every day   Prescription approved per [facility] Refill Protocol.  Aranza Hernandes, RN  Anticoagulation Nurse - [facility], [place]

## 2021-10-15 NOTE — TELEPHONE ENCOUNTER
Routing refill request to provider for review/approval because:  Creatinine   Date Value Ref Range Status   01/22/2021 1.42 (H) 0.66 - 1.25 mg/dL Final     Mari Kaur RN, BSN, PHN  [facility]: [place]

## 2021-10-18 RX ORDER — FUROSEMIDE 40 MG
TABLET ORAL
Qty: 30 TABLET | Refills: 0 | Status: SHIPPED | OUTPATIENT
Start: 2021-10-18

## 2021-10-18 RX ORDER — METOPROLOL SUCCINATE 100 MG/1
TABLET, EXTENDED RELEASE ORAL
Qty: 90 TABLET | Refills: 0 | Status: SHIPPED | OUTPATIENT
Start: 2021-10-18

## 2021-10-19 ENCOUNTER — TELEPHONE (OUTPATIENT)
Dept: FAMILY MEDICINE | Facility: CLINIC | Age: 86
End: 2021-10-19

## 2021-10-19 NOTE — TELEPHONE ENCOUNTER
Forms received from [facility]/ Revision to plan of care #[id number] (cert period: 8/31/21 to 10/29/21) for Mary Beht Hoyt DO.  Forms placed in provider 'sign me' folder.  Please fax forms to [phone] after completion.    Manny Benson RT (R) (ARRT)  Radiology Dept

## 2021-10-27 ENCOUNTER — TELEPHONE (OUTPATIENT)
Dept: FAMILY MEDICINE | Facility: CLINIC | Age: 86
End: 2021-10-27

## 2021-11-09 ENCOUNTER — LAB REQUISITION (OUTPATIENT)
Dept: LAB | Facility: CLINIC | Age: 86
End: 2021-11-09
Payer: COMMERCIAL

## 2021-11-09 ENCOUNTER — TELEPHONE (OUTPATIENT)
Dept: FAMILY MEDICINE | Facility: CLINIC | Age: 86
End: 2021-11-09

## 2021-11-09 DIAGNOSIS — I48.20 CHRONIC ATRIAL FIBRILLATION (H): Primary | ICD-10-CM

## 2021-11-09 DIAGNOSIS — Z01.812 ENCOUNTER FOR PREPROCEDURAL LABORATORY EXAMINATION: ICD-10-CM

## 2021-11-09 LAB — INR PPP: 1.27 (ref 0.85–1.15)

## 2021-11-09 PROCEDURE — 85610 PROTHROMBIN TIME: CPT | Performed by: FAMILY MEDICINE

## 2021-11-09 NOTE — TELEPHONE ENCOUNTER
ANTICOAGULATION  MANAGEMENT: Discharge Review    Israel Moyer chart reviewed for anticoagulation continuity of care    Hospital Admission on 10/23/202-11/08/2021 for acute suspected cholecystitis with sepsis.    Discharge disposition: TCU    Results:    Recent labs: (last 7 days)     11/09/21  0625   INR 1.27*     Anticoagulation inpatient management:     held warfarin due to procedure     Anticoagulation discharge instructions:     Warfarin dosing: home regimen continued   Bridging: No   INR goal change: No      Medication changes affecting anticoagulation: No    Additional factors affecting anticoagulation: No    Plan     Pt is at [facility]. Confirmed with staff that patient will have warfarin dosed by in house provider. [facility] to follow up when discharged from TCU.    Anticoagulation Calendar updated    Vinny Oliveira RN

## 2021-11-11 ENCOUNTER — LAB REQUISITION (OUTPATIENT)
Dept: LAB | Facility: CLINIC | Age: 86
End: 2021-11-11

## 2021-11-11 DIAGNOSIS — Z01.812 ENCOUNTER FOR PREPROCEDURAL LABORATORY EXAMINATION: ICD-10-CM

## 2021-11-11 LAB
ANION GAP SERPL CALCULATED.3IONS-SCNC: 14 MMOL/L (ref 5–18)
BUN SERPL-MCNC: 15 MG/DL (ref 8–28)
CALCIUM SERPL-MCNC: 7.4 MG/DL (ref 8.5–10.5)
CHLORIDE BLD-SCNC: 104 MMOL/L (ref 98–107)
CO2 SERPL-SCNC: 23 MMOL/L (ref 22–31)
CREAT SERPL-MCNC: 1.3 MG/DL (ref 0.7–1.3)
GFR SERPL CREATININE-BSD FRML MDRD: 50 ML/MIN/1.73M2
GLUCOSE BLD-MCNC: 111 MG/DL (ref 70–125)
POTASSIUM BLD-SCNC: 3.8 MMOL/L (ref 3.5–5)
SODIUM SERPL-SCNC: 141 MMOL/L (ref 136–145)

## 2021-11-11 PROCEDURE — 80048 BASIC METABOLIC PNL TOTAL CA: CPT | Performed by: FAMILY MEDICINE

## 2021-11-14 ENCOUNTER — LAB REQUISITION (OUTPATIENT)
Dept: LAB | Facility: CLINIC | Age: 86
End: 2021-11-14

## 2021-11-14 DIAGNOSIS — I10 ESSENTIAL (PRIMARY) HYPERTENSION: ICD-10-CM

## 2021-11-14 LAB
ANION GAP SERPL CALCULATED.3IONS-SCNC: 13 MMOL/L (ref 5–18)
BUN SERPL-MCNC: 16 MG/DL (ref 8–28)
CALCIUM SERPL-MCNC: 8 MG/DL (ref 8.5–10.5)
CHLORIDE BLD-SCNC: 103 MMOL/L (ref 98–107)
CO2 SERPL-SCNC: 24 MMOL/L (ref 22–31)
CREAT SERPL-MCNC: 1.34 MG/DL (ref 0.7–1.3)
ERYTHROCYTE [DISTWIDTH] IN BLOOD BY AUTOMATED COUNT: 15.3 % (ref 10–15)
GFR SERPL CREATININE-BSD FRML MDRD: 48 ML/MIN/1.73M2
GLUCOSE BLD-MCNC: 121 MG/DL (ref 70–125)
HCT VFR BLD AUTO: 32.5 % (ref 40–53)
HGB BLD-MCNC: 9.8 G/DL (ref 13.3–17.7)
MCH RBC QN AUTO: 29.4 PG (ref 26.5–33)
MCHC RBC AUTO-ENTMCNC: 30.2 G/DL (ref 31.5–36.5)
MCV RBC AUTO: 98 FL (ref 78–100)
PLATELET # BLD AUTO: 171 10E3/UL (ref 150–450)
POTASSIUM BLD-SCNC: 3.7 MMOL/L (ref 3.5–5)
RBC # BLD AUTO: 3.33 10E6/UL (ref 4.4–5.9)
SODIUM SERPL-SCNC: 140 MMOL/L (ref 136–145)
WBC # BLD AUTO: 8.2 10E3/UL (ref 4–11)

## 2021-11-14 PROCEDURE — 85027 COMPLETE CBC AUTOMATED: CPT

## 2021-11-14 PROCEDURE — 80048 BASIC METABOLIC PNL TOTAL CA: CPT

## 2021-11-15 ENCOUNTER — TELEPHONE (OUTPATIENT)
Dept: FAMILY MEDICINE | Facility: CLINIC | Age: 86
End: 2021-11-15
Payer: COMMERCIAL

## 2021-11-15 ENCOUNTER — LAB REQUISITION (OUTPATIENT)
Dept: LAB | Facility: CLINIC | Age: 86
End: 2021-11-15

## 2021-11-15 DIAGNOSIS — Z79.01 LONG TERM (CURRENT) USE OF ANTICOAGULANTS: ICD-10-CM

## 2021-11-15 LAB — INR PPP: 1.53 (ref 0.85–1.15)

## 2021-11-15 PROCEDURE — 85610 PROTHROMBIN TIME: CPT | Performed by: FAMILY MEDICINE

## 2021-11-15 NOTE — TELEPHONE ENCOUNTER
Forms received from [facility]/ Revision to Plan of Care #[id number] (cert period: 8/31/21 to 10/29/21) for Mary Beth Hoyt DO.  Forms placed in provider 'sign me' folder.  Please fax forms to [phone] after completion.    Manny Benson RT (R) (ARRT)  Radiology Dept

## 2021-11-18 ENCOUNTER — LAB REQUISITION (OUTPATIENT)
Dept: LAB | Facility: CLINIC | Age: 86
End: 2021-11-18

## 2021-11-18 DIAGNOSIS — I48.20 CHRONIC ATRIAL FIBRILLATION, UNSPECIFIED (H): ICD-10-CM

## 2021-11-18 LAB — INR PPP: 1.71 (ref 0.85–1.15)

## 2021-11-18 PROCEDURE — 85610 PROTHROMBIN TIME: CPT | Performed by: NURSE PRACTITIONER

## 2021-11-22 ENCOUNTER — LAB REQUISITION (OUTPATIENT)
Dept: LAB | Facility: CLINIC | Age: 86
End: 2021-11-22
Payer: COMMERCIAL

## 2021-11-22 DIAGNOSIS — I48.20 CHRONIC ATRIAL FIBRILLATION, UNSPECIFIED (H): ICD-10-CM

## 2021-11-22 DIAGNOSIS — I10 ESSENTIAL (PRIMARY) HYPERTENSION: ICD-10-CM

## 2021-11-22 LAB
ANION GAP SERPL CALCULATED.3IONS-SCNC: 13 MMOL/L (ref 5–18)
BUN SERPL-MCNC: 17 MG/DL (ref 8–28)
CALCIUM SERPL-MCNC: 8.1 MG/DL (ref 8.5–10.5)
CHLORIDE BLD-SCNC: 99 MMOL/L (ref 98–107)
CO2 SERPL-SCNC: 25 MMOL/L (ref 22–31)
CREAT SERPL-MCNC: 1.14 MG/DL (ref 0.7–1.3)
ERYTHROCYTE [DISTWIDTH] IN BLOOD BY AUTOMATED COUNT: 15.2 % (ref 10–15)
GFR SERPL CREATININE-BSD FRML MDRD: 59 ML/MIN/1.73M2
GLUCOSE BLD-MCNC: 94 MG/DL (ref 70–125)
HCT VFR BLD AUTO: 31.9 % (ref 40–53)
HGB BLD-MCNC: 9.4 G/DL (ref 13.3–17.7)
INR PPP: 2.25 (ref 0.85–1.15)
MCH RBC QN AUTO: 28.7 PG (ref 26.5–33)
MCHC RBC AUTO-ENTMCNC: 29.5 G/DL (ref 31.5–36.5)
MCV RBC AUTO: 97 FL (ref 78–100)
PLATELET # BLD AUTO: 197 10E3/UL (ref 150–450)
POTASSIUM BLD-SCNC: 5 MMOL/L (ref 3.5–5)
RBC # BLD AUTO: 3.28 10E6/UL (ref 4.4–5.9)
SODIUM SERPL-SCNC: 137 MMOL/L (ref 136–145)
WBC # BLD AUTO: 5.6 10E3/UL (ref 4–11)

## 2021-11-22 PROCEDURE — 85610 PROTHROMBIN TIME: CPT | Performed by: NURSE PRACTITIONER

## 2021-11-22 PROCEDURE — 80048 BASIC METABOLIC PNL TOTAL CA: CPT | Performed by: NURSE PRACTITIONER

## 2021-11-22 PROCEDURE — 85027 COMPLETE CBC AUTOMATED: CPT | Performed by: NURSE PRACTITIONER

## 2021-11-26 ENCOUNTER — LAB REQUISITION (OUTPATIENT)
Dept: LAB | Facility: CLINIC | Age: 86
End: 2021-11-26

## 2021-11-26 DIAGNOSIS — I48.20 CHRONIC ATRIAL FIBRILLATION, UNSPECIFIED (H): ICD-10-CM

## 2021-11-26 LAB — INR PPP: 2.37 (ref 0.85–1.15)

## 2021-11-26 PROCEDURE — 85610 PROTHROMBIN TIME: CPT

## 2021-11-30 ENCOUNTER — LAB REQUISITION (OUTPATIENT)
Dept: LAB | Facility: CLINIC | Age: 86
End: 2021-11-30

## 2021-11-30 DIAGNOSIS — I48.20 CHRONIC ATRIAL FIBRILLATION, UNSPECIFIED (H): ICD-10-CM

## 2021-11-30 LAB — INR PPP: 2.52 (ref 0.85–1.15)

## 2021-11-30 PROCEDURE — 85610 PROTHROMBIN TIME: CPT

## 2021-12-01 ENCOUNTER — LAB REQUISITION (OUTPATIENT)
Dept: LAB | Facility: CLINIC | Age: 86
End: 2021-12-01

## 2021-12-01 DIAGNOSIS — Z01.812 ENCOUNTER FOR PREPROCEDURAL LABORATORY EXAMINATION: ICD-10-CM

## 2021-12-01 LAB — HGB BLD-MCNC: 9.6 G/DL (ref 13.3–17.7)

## 2021-12-01 PROCEDURE — 85018 HEMOGLOBIN: CPT | Performed by: FAMILY MEDICINE

## 2021-12-03 ENCOUNTER — LAB REQUISITION (OUTPATIENT)
Dept: LAB | Facility: CLINIC | Age: 86
End: 2021-12-03

## 2021-12-03 DIAGNOSIS — I48.20 CHRONIC ATRIAL FIBRILLATION, UNSPECIFIED (H): ICD-10-CM

## 2021-12-03 LAB — INR PPP: 2.63 (ref 0.85–1.15)

## 2021-12-03 PROCEDURE — 85610 PROTHROMBIN TIME: CPT | Performed by: NURSE PRACTITIONER

## 2021-12-09 ENCOUNTER — LAB REQUISITION (OUTPATIENT)
Dept: LAB | Facility: CLINIC | Age: 86
End: 2021-12-09

## 2021-12-09 DIAGNOSIS — I48.20 CHRONIC ATRIAL FIBRILLATION, UNSPECIFIED (H): ICD-10-CM

## 2021-12-09 LAB — INR PPP: 2.53 (ref 0.85–1.15)

## 2021-12-09 PROCEDURE — 85610 PROTHROMBIN TIME: CPT | Performed by: NURSE PRACTITIONER

## 2021-12-21 ENCOUNTER — LAB REQUISITION (OUTPATIENT)
Dept: LAB | Facility: CLINIC | Age: 86
End: 2021-12-21
Payer: COMMERCIAL

## 2021-12-21 DIAGNOSIS — R53.1 WEAKNESS: ICD-10-CM

## 2021-12-21 LAB
ANION GAP SERPL CALCULATED.3IONS-SCNC: 14 MMOL/L (ref 5–18)
BUN SERPL-MCNC: 18 MG/DL (ref 8–28)
CALCIUM SERPL-MCNC: 8.3 MG/DL (ref 8.5–10.5)
CHLORIDE BLD-SCNC: 104 MMOL/L (ref 98–107)
CO2 SERPL-SCNC: 22 MMOL/L (ref 22–31)
CREAT SERPL-MCNC: 0.86 MG/DL (ref 0.7–1.3)
GFR SERPL CREATININE-BSD FRML MDRD: 85 ML/MIN/1.73M2
GLUCOSE BLD-MCNC: 105 MG/DL (ref 70–125)
HGB BLD-MCNC: 9.2 G/DL (ref 13.3–17.7)
POTASSIUM BLD-SCNC: 3.8 MMOL/L (ref 3.5–5)
SODIUM SERPL-SCNC: 140 MMOL/L (ref 136–145)

## 2021-12-21 PROCEDURE — 85018 HEMOGLOBIN: CPT | Mod: ORL | Performed by: NURSE PRACTITIONER

## 2021-12-21 PROCEDURE — 80048 BASIC METABOLIC PNL TOTAL CA: CPT | Mod: ORL | Performed by: NURSE PRACTITIONER

## 2021-12-27 ENCOUNTER — LAB REQUISITION (OUTPATIENT)
Dept: LAB | Facility: CLINIC | Age: 86
End: 2021-12-27
Payer: COMMERCIAL

## 2021-12-27 DIAGNOSIS — K81.0 ACUTE CHOLECYSTITIS: ICD-10-CM

## 2021-12-27 LAB
ERYTHROCYTE [DISTWIDTH] IN BLOOD BY AUTOMATED COUNT: 16.8 % (ref 10–15)
HCT VFR BLD AUTO: 30.6 % (ref 40–53)
HGB BLD-MCNC: 9 G/DL (ref 13.3–17.7)
MCH RBC QN AUTO: 27.4 PG (ref 26.5–33)
MCHC RBC AUTO-ENTMCNC: 29.4 G/DL (ref 31.5–36.5)
MCV RBC AUTO: 93 FL (ref 78–100)
PLATELET # BLD AUTO: 254 10E3/UL (ref 150–450)
RBC # BLD AUTO: 3.28 10E6/UL (ref 4.4–5.9)
WBC # BLD AUTO: 5.6 10E3/UL (ref 4–11)

## 2021-12-27 PROCEDURE — 85027 COMPLETE CBC AUTOMATED: CPT | Mod: ORL | Performed by: NURSE PRACTITIONER

## 2021-12-31 ENCOUNTER — LAB REQUISITION (OUTPATIENT)
Dept: LAB | Facility: CLINIC | Age: 86
End: 2021-12-31
Payer: COMMERCIAL

## 2021-12-31 DIAGNOSIS — U07.1 COVID-19: ICD-10-CM

## 2021-12-31 PROCEDURE — U0005 INFEC AGEN DETEC AMPLI PROBE: HCPCS | Mod: ORL | Performed by: INTERNAL MEDICINE

## 2022-01-02 LAB — SARS-COV-2 RNA RESP QL NAA+PROBE: NEGATIVE

## 2022-01-07 ENCOUNTER — LAB REQUISITION (OUTPATIENT)
Dept: LAB | Facility: CLINIC | Age: 87
End: 2022-01-07
Payer: COMMERCIAL

## 2022-01-07 DIAGNOSIS — I48.20 CHRONIC ATRIAL FIBRILLATION, UNSPECIFIED (H): ICD-10-CM

## 2022-01-07 LAB — INR PPP: 1.5 (ref 0.85–1.15)

## 2022-01-07 PROCEDURE — 85610 PROTHROMBIN TIME: CPT | Mod: ORL | Performed by: FAMILY MEDICINE

## 2022-02-10 ENCOUNTER — LAB REQUISITION (OUTPATIENT)
Dept: LAB | Facility: CLINIC | Age: 87
End: 2022-02-10
Payer: COMMERCIAL

## 2022-02-10 DIAGNOSIS — D64.9 ANEMIA, UNSPECIFIED: ICD-10-CM

## 2022-02-10 LAB
ANION GAP SERPL CALCULATED.3IONS-SCNC: 12 MMOL/L (ref 5–18)
BUN SERPL-MCNC: 22 MG/DL (ref 8–28)
CALCIUM SERPL-MCNC: 8.5 MG/DL (ref 8.5–10.5)
CHLORIDE BLD-SCNC: 103 MMOL/L (ref 98–107)
CO2 SERPL-SCNC: 26 MMOL/L (ref 22–31)
CREAT SERPL-MCNC: 1.13 MG/DL (ref 0.7–1.3)
ERYTHROCYTE [DISTWIDTH] IN BLOOD BY AUTOMATED COUNT: 18.3 % (ref 10–15)
GFR SERPL CREATININE-BSD FRML MDRD: 64 ML/MIN/1.73M2
GLUCOSE BLD-MCNC: 108 MG/DL (ref 70–125)
HCT VFR BLD AUTO: 30.2 % (ref 40–53)
HGB BLD-MCNC: 9.2 G/DL (ref 13.3–17.7)
MCH RBC QN AUTO: 28.4 PG (ref 26.5–33)
MCHC RBC AUTO-ENTMCNC: 30.5 G/DL (ref 31.5–36.5)
MCV RBC AUTO: 93 FL (ref 78–100)
PLATELET # BLD AUTO: 199 10E3/UL (ref 150–450)
POTASSIUM BLD-SCNC: 3.9 MMOL/L (ref 3.5–5)
RBC # BLD AUTO: 3.24 10E6/UL (ref 4.4–5.9)
SODIUM SERPL-SCNC: 141 MMOL/L (ref 136–145)
TSH SERPL DL<=0.005 MIU/L-ACNC: 3.5 UIU/ML (ref 0.3–5)
WBC # BLD AUTO: 6.1 10E3/UL (ref 4–11)

## 2022-02-10 PROCEDURE — 85027 COMPLETE CBC AUTOMATED: CPT | Mod: ORL | Performed by: NURSE PRACTITIONER

## 2022-02-10 PROCEDURE — 80048 BASIC METABOLIC PNL TOTAL CA: CPT | Mod: ORL | Performed by: NURSE PRACTITIONER

## 2022-02-10 PROCEDURE — 84443 ASSAY THYROID STIM HORMONE: CPT | Mod: ORL | Performed by: NURSE PRACTITIONER

## 2022-03-02 ENCOUNTER — LAB REQUISITION (OUTPATIENT)
Dept: LAB | Facility: CLINIC | Age: 87
End: 2022-03-02
Payer: COMMERCIAL

## 2022-03-02 DIAGNOSIS — I48.20 CHRONIC ATRIAL FIBRILLATION, UNSPECIFIED (H): ICD-10-CM

## 2022-03-02 LAB — INR PPP: 2.05 (ref 0.85–1.15)

## 2022-03-02 PROCEDURE — 85610 PROTHROMBIN TIME: CPT | Mod: ORL | Performed by: FAMILY MEDICINE

## 2022-03-05 ENCOUNTER — HEALTH MAINTENANCE LETTER (OUTPATIENT)
Age: 87
End: 2022-03-05

## 2022-03-30 ENCOUNTER — LAB REQUISITION (OUTPATIENT)
Dept: LAB | Facility: CLINIC | Age: 87
End: 2022-03-30

## 2022-03-30 DIAGNOSIS — I48.20 CHRONIC ATRIAL FIBRILLATION, UNSPECIFIED (H): ICD-10-CM

## 2022-03-30 LAB — INR PPP: 3.27 (ref 0.85–1.15)

## 2022-03-30 PROCEDURE — 85610 PROTHROMBIN TIME: CPT | Performed by: FAMILY MEDICINE

## 2022-04-06 DIAGNOSIS — I48.20 CHRONIC ATRIAL FIBRILLATION (H): Primary | ICD-10-CM

## 2022-04-28 ENCOUNTER — LAB REQUISITION (OUTPATIENT)
Dept: LAB | Facility: CLINIC | Age: 87
End: 2022-04-28

## 2022-04-28 DIAGNOSIS — I48.20 CHRONIC ATRIAL FIBRILLATION, UNSPECIFIED (H): ICD-10-CM

## 2022-04-28 LAB
ERYTHROCYTE [DISTWIDTH] IN BLOOD BY AUTOMATED COUNT: 17.8 % (ref 10–15)
HCT VFR BLD AUTO: 30.6 % (ref 40–53)
HGB BLD-MCNC: 9.2 G/DL (ref 13.3–17.7)
INR PPP: 2.19 (ref 0.85–1.15)
MCH RBC QN AUTO: 28.5 PG (ref 26.5–33)
MCHC RBC AUTO-ENTMCNC: 30.1 G/DL (ref 31.5–36.5)
MCV RBC AUTO: 95 FL (ref 78–100)
PLATELET # BLD AUTO: 167 10E3/UL (ref 150–450)
RBC # BLD AUTO: 3.23 10E6/UL (ref 4.4–5.9)
WBC # BLD AUTO: 6.4 10E3/UL (ref 4–11)

## 2022-04-28 PROCEDURE — 85027 COMPLETE CBC AUTOMATED: CPT | Performed by: FAMILY MEDICINE

## 2022-04-28 PROCEDURE — 85610 PROTHROMBIN TIME: CPT | Performed by: FAMILY MEDICINE

## 2022-05-05 ENCOUNTER — DOCUMENTATION ONLY (OUTPATIENT)
Dept: ANTICOAGULATION | Facility: CLINIC | Age: 87
End: 2022-05-05
Payer: COMMERCIAL

## 2022-05-05 DIAGNOSIS — I48.20 CHRONIC ATRIAL FIBRILLATION (H): Primary | ICD-10-CM

## 2022-05-05 NOTE — PROGRESS NOTES
ANTICOAGULATION MANAGEMENT      Israel Moyer due for annual renewal of referral to anticoagulation monitoring. Order pended for your review and signature.      ANTICOAGULATION SUMMARY      Warfarin indication(s)     Atrial fibrillation    Heart valve present?  NO       Current goal range   INR: 2.0-3.0     Goal appropriate for indication? Yes, INR 2-3 appropriate for hx of DVT, PE, hypercoagulable state, Afib, LVAD, or bileaflet AVR without risk factors     Current duration of therapy Indefinite/long term therapy   Time in Therapeutic Range (TTR)  (Goal > 60%) 77.0%       Office visit with referring provider's group within last year yes on 5/13/21       Maricruz Chao RN

## 2022-05-12 NOTE — TELEPHONE ENCOUNTER
Forms received from [facility] the [facility]/ Revision to Plan of Care - SN to INR 2.2 on 11/12/20   for Zechariah Shukla MD.  Forms placed in provider 'sign me' folder.  Please fax forms to  [phone] after completion.    Rut Cole  Patient Representative       LVM to give our clinic a call back to reschedule procedure.

## 2022-06-01 ENCOUNTER — MYC MEDICAL ADVICE (OUTPATIENT)
Dept: FAMILY MEDICINE | Facility: CLINIC | Age: 87
End: 2022-06-01
Payer: COMMERCIAL

## 2022-06-01 NOTE — LETTER
June 1, 2022      Israel Moyer  [address]   [place] [address]      Your healthcare team cares about your health. To provide you with the best care, we have reviewed your chart and based on our findings, we see that you are due to:     - ANNUAL WELLNESS VISIT:   Schedule an Annual Medicare Wellness Exam.     If you have already completed these items, please contact the clinic via phone or Mychart so your care team can review and update your records.  Thank you for choosing [facility] for your healthcare needs. For any questions, concerns, or to schedule an appointment please contact the clinic.       Healthy Regards,      Your [facility] Care Team

## 2022-06-01 NOTE — TELEPHONE ENCOUNTER
Patient Quality Outreach    Patient is due for the following:   Physical  - Overdue    NEXT STEPS:   Schedule a yearly physical    Type of outreach:    Sent letter.      Questions for provider review:    None     Angela Lopez CMA

## 2022-06-02 ENCOUNTER — LAB REQUISITION (OUTPATIENT)
Dept: LAB | Facility: CLINIC | Age: 87
End: 2022-06-02
Payer: COMMERCIAL

## 2022-06-02 DIAGNOSIS — Z79.01 LONG TERM (CURRENT) USE OF ANTICOAGULANTS: ICD-10-CM

## 2022-06-02 LAB
CHOLEST SERPL-MCNC: 113 MG/DL
FASTING STATUS PATIENT QL REPORTED: ABNORMAL
HDLC SERPL-MCNC: 34 MG/DL
LDLC SERPL CALC-MCNC: 61 MG/DL
TRIGL SERPL-MCNC: 90 MG/DL

## 2022-06-02 PROCEDURE — 80061 LIPID PANEL: CPT | Mod: ORL | Performed by: NURSE PRACTITIONER

## 2022-06-07 NOTE — TELEPHONE ENCOUNTER
Forms completed for [facility] and faxed to [phone]    Wendi Sauer  Patient Registration  [facility]   MOLECULAR PCR

## 2022-06-13 ENCOUNTER — TELEPHONE (OUTPATIENT)
Dept: FAMILY MEDICINE | Facility: CLINIC | Age: 87
End: 2022-06-13
Payer: COMMERCIAL

## 2022-06-13 NOTE — TELEPHONE ENCOUNTER
Called Paula back from [facility]- gave verbal order as indicated below from PCP      Lucila Castro, RN

## 2022-06-13 NOTE — TELEPHONE ENCOUNTER
Paula RN with [facility] health calling.     Patient was discharged from TCU after 8 months. He is now at [facility]. He still intends to follow Dr. Hoyt for care. He is aware of need to schedule face to face appt within next month.     Paula wants to make sure that PCP is willing to take over home care orders for this patient? Orders were written at discharged from TCU, but will need to continue past 30 days. Patient also needs INR dosing. Appears that anticoag clinic referral was placed on 5/5/22 by PCP for yearly renewal. RN provided anticoag clinic number to Paula to relay home INR results for dosing instructions.    Routing to Dr. Hoyt - willing to follow patient for home care?     Mari Kaur, RN, BSN, PHN  [facility]: [place]

## 2022-06-14 ENCOUNTER — ANTICOAGULATION THERAPY VISIT (OUTPATIENT)
Dept: ANTICOAGULATION | Facility: CLINIC | Age: 87
End: 2022-06-14
Payer: COMMERCIAL

## 2022-06-14 DIAGNOSIS — I48.20 CHRONIC ATRIAL FIBRILLATION (H): Primary | ICD-10-CM

## 2022-06-14 LAB — INR (EXTERNAL): 2.7 (ref 0.9–1.1)

## 2022-06-14 NOTE — PROGRESS NOTES
ANTICOAGULATION MANAGEMENT     Israel Moyer 86 year old male is on warfarin with therapeutic INR result. (Goal INR 2.0-3.0)    Recent labs: (last 7 days)     06/14/22  1130   INR 2.7*       ASSESSMENT       Source(s): Chart Review and Home Care/Facility Nurse       Warfarin doses taken: Warfarin taken as instructed and patient information from [facility] is that he was taking 3.75 mg M/F and 2.5 mg ROW in TCU. Maintenance was adjusted to reflect this.     Diet: No new diet changes identified    New illness, injury, or hospitalization: No    Medication/supplement changes: None noted    Signs or symptoms of bleeding or clotting: No    Previous INR: [facility] unsure of the date but last INR was 2.5 mg.    Additional findings: patient spent the last 8 months in a TCU where INR was managed by [facility]       PLAN     Recommended plan for no diet, medication or health factor changes affecting INR     Dosing Instructions: continue your current warfarin dose with next INR in 1 week       Summary  As of 6/14/2022    Full warfarin instructions:  3.75 mg every Mon, Fri; 2.5 mg all other days   Next INR check:  6/21/2022             Detailed voice message left for [facility] home care/facility nurse with dosing instructions and follow up date.     Orders given to  Homecare nurse/facility to recheck    Education provided: None required    Plan made with [facility] Pharmacist Roya Rm, RN  Anticoagulation Clinic  6/14/2022    _______________________________________________________________________     Anticoagulation Episode Summary     Current INR goal:  2.0-3.0   TTR:  62.4 % (1 y)   Target end date:  Indefinite   Send INR reminders to:  DELIO ISRAEL    Indications    Chronic atrial fibrillation (H) [I48.20]           Comments:  Home Care         Anticoagulation Care Providers     Provider Role Specialty Phone number    Ry Shukla MD Referring Family Medicine [phone]    Mary Beth Hoyt DO Referring  Family Medicine [phone]

## 2022-06-15 ENCOUNTER — TELEPHONE (OUTPATIENT)
Dept: FAMILY MEDICINE | Facility: CLINIC | Age: 87
End: 2022-06-15
Payer: COMMERCIAL

## 2022-06-15 ENCOUNTER — LAB REQUISITION (OUTPATIENT)
Dept: LAB | Facility: CLINIC | Age: 87
End: 2022-06-15
Payer: COMMERCIAL

## 2022-06-15 DIAGNOSIS — I10 ESSENTIAL (PRIMARY) HYPERTENSION: ICD-10-CM

## 2022-06-15 DIAGNOSIS — M10.9 GOUT, UNSPECIFIED: ICD-10-CM

## 2022-06-15 DIAGNOSIS — I48.20 CHRONIC ATRIAL FIBRILLATION, UNSPECIFIED (H): ICD-10-CM

## 2022-06-15 DIAGNOSIS — I48.20 CHRONIC ATRIAL FIBRILLATION (H): Primary | ICD-10-CM

## 2022-06-16 ENCOUNTER — ANTICOAGULATION THERAPY VISIT (OUTPATIENT)
Dept: ANTICOAGULATION | Facility: CLINIC | Age: 87
End: 2022-06-16

## 2022-06-16 DIAGNOSIS — I48.20 CHRONIC ATRIAL FIBRILLATION (H): Primary | ICD-10-CM

## 2022-06-16 LAB
ALBUMIN SERPL-MCNC: 3.6 G/DL (ref 3.5–5)
ALP SERPL-CCNC: 147 U/L (ref 45–120)
ALT SERPL W P-5'-P-CCNC: 9 U/L (ref 0–45)
ANION GAP SERPL CALCULATED.3IONS-SCNC: 15 MMOL/L (ref 5–18)
AST SERPL W P-5'-P-CCNC: 14 U/L (ref 0–40)
BASOPHILS # BLD AUTO: 0.1 10E3/UL (ref 0–0.2)
BASOPHILS NFR BLD AUTO: 1 %
BILIRUB SERPL-MCNC: 0.6 MG/DL (ref 0–1)
BUN SERPL-MCNC: 15 MG/DL (ref 8–28)
CALCIUM SERPL-MCNC: 9.3 MG/DL (ref 8.5–10.5)
CHLORIDE BLD-SCNC: 102 MMOL/L (ref 98–107)
CO2 SERPL-SCNC: 26 MMOL/L (ref 22–31)
CREAT SERPL-MCNC: 1.22 MG/DL (ref 0.7–1.3)
EOSINOPHIL # BLD AUTO: 0.3 10E3/UL (ref 0–0.7)
EOSINOPHIL NFR BLD AUTO: 5 %
ERYTHROCYTE [DISTWIDTH] IN BLOOD BY AUTOMATED COUNT: 16.7 % (ref 10–15)
GFR SERPL CREATININE-BSD FRML MDRD: 58 ML/MIN/1.73M2
GLUCOSE BLD-MCNC: 109 MG/DL (ref 70–125)
HCT VFR BLD AUTO: 38.2 % (ref 40–53)
HGB BLD-MCNC: 11.5 G/DL (ref 13.3–17.7)
IMM GRANULOCYTES # BLD: 0.1 10E3/UL
IMM GRANULOCYTES NFR BLD: 1 %
INR PPP: 2.33 (ref 0.85–1.15)
LYMPHOCYTES # BLD AUTO: 1.4 10E3/UL (ref 0.8–5.3)
LYMPHOCYTES NFR BLD AUTO: 24 %
MCH RBC QN AUTO: 29.3 PG (ref 26.5–33)
MCHC RBC AUTO-ENTMCNC: 30.1 G/DL (ref 31.5–36.5)
MCV RBC AUTO: 97 FL (ref 78–100)
MONOCYTES # BLD AUTO: 0.4 10E3/UL (ref 0–1.3)
MONOCYTES NFR BLD AUTO: 7 %
NEUTROPHILS # BLD AUTO: 3.7 10E3/UL (ref 1.6–8.3)
NEUTROPHILS NFR BLD AUTO: 62 %
NRBC # BLD AUTO: 0 10E3/UL
NRBC BLD AUTO-RTO: 0 /100
PLATELET # BLD AUTO: 224 10E3/UL (ref 150–450)
POTASSIUM BLD-SCNC: 3.2 MMOL/L (ref 3.5–5)
PROT SERPL-MCNC: 7.6 G/DL (ref 6–8)
RBC # BLD AUTO: 3.93 10E6/UL (ref 4.4–5.9)
SODIUM SERPL-SCNC: 143 MMOL/L (ref 136–145)
TSH SERPL DL<=0.005 MIU/L-ACNC: 4.62 UIU/ML (ref 0.3–5)
URATE SERPL-MCNC: 7.5 MG/DL (ref 3–8)
WBC # BLD AUTO: 5.9 10E3/UL (ref 4–11)

## 2022-06-16 PROCEDURE — 80053 COMPREHEN METABOLIC PANEL: CPT | Mod: ORL | Performed by: INTERNAL MEDICINE

## 2022-06-16 PROCEDURE — 36415 COLL VENOUS BLD VENIPUNCTURE: CPT | Mod: ORL | Performed by: INTERNAL MEDICINE

## 2022-06-16 PROCEDURE — 84443 ASSAY THYROID STIM HORMONE: CPT | Mod: ORL | Performed by: INTERNAL MEDICINE

## 2022-06-16 PROCEDURE — 85610 PROTHROMBIN TIME: CPT | Mod: ORL | Performed by: INTERNAL MEDICINE

## 2022-06-16 PROCEDURE — P9603 ONE-WAY ALLOW PRORATED MILES: HCPCS | Mod: ORL | Performed by: INTERNAL MEDICINE

## 2022-06-16 PROCEDURE — 84550 ASSAY OF BLOOD/URIC ACID: CPT | Mod: ORL | Performed by: INTERNAL MEDICINE

## 2022-06-16 PROCEDURE — 82607 VITAMIN B-12: CPT | Mod: ORL | Performed by: INTERNAL MEDICINE

## 2022-06-16 PROCEDURE — 85025 COMPLETE CBC W/AUTO DIFF WBC: CPT | Mod: ORL | Performed by: INTERNAL MEDICINE

## 2022-06-16 NOTE — PROGRESS NOTES
INR received today which was ordered by [facility] in-house physician. Pt had INR done by home care and was dosed by [facility] on Tuesday with next recheck date 6/21.    LVM left for nursing line at [facility] to find out if patient will continue INR management through homecare and [facility] or if in house physician will be taking over management.     Aliza Walsh RN

## 2022-06-16 NOTE — PROGRESS NOTES
ANTICOAGULATION  MANAGEMENT    Israel Moyer is being discharged from the [facility]agulation [facility] ([facility]).    Reason for discharge: care has been transferred to [facility] - spoke with nurseMarva who confirmed [facility] will be taking over management of Warfarin and INRs.     Anticoagulation episode resolved, [facility] referral closed and Standing order discontinued    If patient needs warfarin management in the future, please send a new referral    Aliza Walsh RN

## 2022-06-17 ENCOUNTER — TELEPHONE (OUTPATIENT)
Dept: FAMILY MEDICINE | Facility: CLINIC | Age: 87
End: 2022-06-17

## 2022-06-17 LAB — VIT B12 SERPL-MCNC: 1597 PG/ML (ref 213–816)

## 2022-06-17 NOTE — TELEPHONE ENCOUNTER
Routing back to team to assist in scheduling face to face visit with Dr. Hoyt . Pt has not seen provider in over a year since 5/2021. Okay for home care orders below per standing order, but needs visit with provider    Wendi Garrett RN

## 2022-06-17 NOTE — TELEPHONE ENCOUNTER
Reason for Call:  Home Health Care    Karina  with [facility] called regarding (reason for call): verbal orders    Orders are needed for this patient.     PT:   Continue PT - 2x week for 3 weeks for training and gait and home exercise program.    OT:     Skilled Nursing:     Pt Provider: Dr Hoyt    Phone Number Homecare Nurse can be reached at: [phone]    Can we leave a detailed message on this number? YES    Phone number patient can be reached at: Home number on file [phone] (home)    Best Time: any    Call taken on 6/17/2022 at 2:03 PM by Rut Cole

## 2022-06-21 NOTE — TELEPHONE ENCOUNTER
Called patient and left a voicemail message to call clinic.    PREETI Tran  [facility]

## 2022-06-23 NOTE — TELEPHONE ENCOUNTER
Called patient to schedule a visit. Patient is living in an Assisted Living facility- ([facility]) where there is a doctor that works there.  States he got the doctor's card and would ask the Doctor on site if she would do the home care orders/visit F2F.  Patient does not want to come in the clinic    Halie Larsen-  Conrado Edgar

## 2022-06-29 ENCOUNTER — LAB REQUISITION (OUTPATIENT)
Dept: LAB | Facility: CLINIC | Age: 87
End: 2022-06-29
Payer: COMMERCIAL

## 2022-06-29 DIAGNOSIS — I50.9 HEART FAILURE, UNSPECIFIED (H): ICD-10-CM

## 2022-06-30 LAB
ANION GAP SERPL CALCULATED.3IONS-SCNC: 9 MMOL/L (ref 7–15)
BUN SERPL-MCNC: 25.6 MG/DL (ref 8–23)
CALCIUM SERPL-MCNC: 9.2 MG/DL (ref 8.8–10.2)
CHLORIDE SERPL-SCNC: 101 MMOL/L (ref 98–107)
CREAT SERPL-MCNC: 1.29 MG/DL (ref 0.67–1.17)
DEPRECATED HCO3 PLAS-SCNC: 31 MMOL/L (ref 22–29)
GFR SERPL CREATININE-BSD FRML MDRD: 55 ML/MIN/1.73M2
GLUCOSE SERPL-MCNC: 114 MG/DL (ref 70–99)
POTASSIUM SERPL-SCNC: 3.6 MMOL/L (ref 3.4–5.3)
SODIUM SERPL-SCNC: 141 MMOL/L (ref 136–145)

## 2022-06-30 PROCEDURE — 80048 BASIC METABOLIC PNL TOTAL CA: CPT | Mod: ORL | Performed by: INTERNAL MEDICINE

## 2022-06-30 PROCEDURE — 36415 COLL VENOUS BLD VENIPUNCTURE: CPT | Mod: ORL | Performed by: INTERNAL MEDICINE

## 2022-06-30 PROCEDURE — P9603 ONE-WAY ALLOW PRORATED MILES: HCPCS | Mod: ORL | Performed by: INTERNAL MEDICINE

## 2022-07-06 ENCOUNTER — LAB REQUISITION (OUTPATIENT)
Dept: LAB | Facility: CLINIC | Age: 87
End: 2022-07-06
Payer: COMMERCIAL

## 2022-07-06 ENCOUNTER — TELEPHONE (OUTPATIENT)
Dept: FAMILY MEDICINE | Facility: CLINIC | Age: 87
End: 2022-07-06

## 2022-07-06 DIAGNOSIS — Z79.01 LONG TERM (CURRENT) USE OF ANTICOAGULANTS: ICD-10-CM

## 2022-07-06 NOTE — TELEPHONE ENCOUNTER
Reason for Call:  Form, our goal is to have forms completed with 72 hours, however, some forms may require a visit or additional information.    Type of letter, form or note:  Home Health Certification    Who is the form from?: Home care    Where did the form come from: form was faxed in    What clinic location was the form placed at?: [facility] desk    Where the form was placed: Given to physician    What number is listed as a contact on the form?: [phone]       Additional comments: Please fax to [phone] once complete     Call taken on 7/6/2022 at 9:46 AM by HAYLEE ANTHONY

## 2022-07-07 ENCOUNTER — TELEPHONE (OUTPATIENT)
Dept: FAMILY MEDICINE | Facility: CLINIC | Age: 87
End: 2022-07-07

## 2022-07-07 LAB — INR PPP: 4.25 (ref 0.85–1.15)

## 2022-07-07 PROCEDURE — 85610 PROTHROMBIN TIME: CPT | Mod: ORL | Performed by: INTERNAL MEDICINE

## 2022-07-07 PROCEDURE — 36415 COLL VENOUS BLD VENIPUNCTURE: CPT | Mod: ORL | Performed by: INTERNAL MEDICINE

## 2022-07-07 PROCEDURE — P9603 ONE-WAY ALLOW PRORATED MILES: HCPCS | Mod: ORL | Performed by: INTERNAL MEDICINE

## 2022-07-07 NOTE — TELEPHONE ENCOUNTER
Forms received from [facility]/ Revision to Plan of Care # [id number], [id number], [id number], [facility] and Plan of care #[id number], Addendum to plan of care (cert period: 6/12/22 to 8/10/22) for Mary Beth Hoyt DO.  Forms placed in provider 'sign me' folder.  Please fax forms to [phone] after completion.    Manny Benson RT (R) (ARRT)  Radiology Dept

## 2022-07-12 ENCOUNTER — TELEPHONE (OUTPATIENT)
Dept: FAMILY MEDICINE | Facility: CLINIC | Age: 87
End: 2022-07-12

## 2022-07-12 NOTE — TELEPHONE ENCOUNTER
Received forms from [facility].  Revision to POC  [id number], [id number],     Placed forms in LC box to review and sign  Fax completed forms to [phone]    Halie Larsen-  Conrado Edgar

## 2022-07-27 ENCOUNTER — TELEPHONE (OUTPATIENT)
Dept: FAMILY MEDICINE | Facility: CLINIC | Age: 87
End: 2022-07-27

## 2022-07-27 NOTE — TELEPHONE ENCOUNTER
Forms/Letter Request    Type of form/letter: [facility]    Have you been seen for this request: N/A    Do we have the form/letter: Yes: form was faxed to us Order number [id number]    When is form/letter needed by: asap    How would you like the form/letter returned: Fax    Patient Notified form requests are processed in 3-5 business days:No    Could we send this information to you in [employer] or would you prefer to receive a phone call?:  Fax completed forms to [phone]    Halie JARRELL

## 2022-07-28 ENCOUNTER — TELEPHONE (OUTPATIENT)
Dept: FAMILY MEDICINE | Facility: CLINIC | Age: 87
End: 2022-07-28

## 2022-07-28 NOTE — TELEPHONE ENCOUNTER
Forms/Letter Request    Type of form/letter: [facility]     Have you been seen for this request: N/A    Do we have the form/letter: Yes: form faxed Order number [id number]    When is form/letter needed by: asap    How would you like the form/letter returned: Fax    Patient Notified form requests are processed in 3-5 business days:No    Fax completed forms to [phone]    Halie JARRELL

## 2022-08-01 ENCOUNTER — LAB REQUISITION (OUTPATIENT)
Dept: LAB | Facility: CLINIC | Age: 87
End: 2022-08-01
Payer: COMMERCIAL

## 2022-08-01 DIAGNOSIS — Z79.01 LONG TERM (CURRENT) USE OF ANTICOAGULANTS: ICD-10-CM

## 2022-11-02 PROCEDURE — 81001 URINALYSIS AUTO W/SCOPE: CPT | Mod: ORL | Performed by: FAMILY MEDICINE

## 2022-11-20 ENCOUNTER — HEALTH MAINTENANCE LETTER (OUTPATIENT)
Age: 87
End: 2022-11-20

## 2022-12-08 ENCOUNTER — LAB REQUISITION (OUTPATIENT)
Dept: LAB | Facility: CLINIC | Age: 87
End: 2022-12-08
Payer: COMMERCIAL

## 2022-12-08 DIAGNOSIS — E07.9 DISORDER OF THYROID, UNSPECIFIED: ICD-10-CM

## 2022-12-08 DIAGNOSIS — E53.8 DEFICIENCY OF OTHER SPECIFIED B GROUP VITAMINS: ICD-10-CM

## 2022-12-08 DIAGNOSIS — D50.9 IRON DEFICIENCY ANEMIA, UNSPECIFIED: ICD-10-CM

## 2022-12-08 DIAGNOSIS — M10.9 GOUT, UNSPECIFIED: ICD-10-CM

## 2022-12-08 DIAGNOSIS — E55.9 VITAMIN D DEFICIENCY, UNSPECIFIED: ICD-10-CM

## 2022-12-08 DIAGNOSIS — E78.5 HYPERLIPIDEMIA, UNSPECIFIED: ICD-10-CM

## 2022-12-08 DIAGNOSIS — I10 ESSENTIAL (PRIMARY) HYPERTENSION: ICD-10-CM

## 2022-12-12 LAB
ALBUMIN SERPL BCG-MCNC: 4.2 G/DL (ref 3.5–5.2)
ALP SERPL-CCNC: 129 U/L (ref 40–129)
ALT SERPL W P-5'-P-CCNC: 10 U/L (ref 10–50)
ANION GAP SERPL CALCULATED.3IONS-SCNC: 17 MMOL/L (ref 7–15)
AST SERPL W P-5'-P-CCNC: 21 U/L (ref 10–50)
BILIRUB SERPL-MCNC: 0.5 MG/DL
BUN SERPL-MCNC: 32 MG/DL (ref 8–23)
CALCIUM SERPL-MCNC: 9.7 MG/DL (ref 8.8–10.2)
CHLORIDE SERPL-SCNC: 98 MMOL/L (ref 98–107)
CHOLEST SERPL-MCNC: 128 MG/DL
CREAT SERPL-MCNC: 1.44 MG/DL (ref 0.67–1.17)
DEPRECATED CALCIDIOL+CALCIFEROL SERPL-MC: 20 UG/L (ref 20–75)
DEPRECATED HCO3 PLAS-SCNC: 24 MMOL/L (ref 22–29)
ERYTHROCYTE [DISTWIDTH] IN BLOOD BY AUTOMATED COUNT: 14.4 % (ref 10–15)
FERRITIN SERPL-MCNC: 412 NG/ML (ref 31–409)
GFR SERPL CREATININE-BSD FRML MDRD: 48 ML/MIN/1.73M2
GLUCOSE SERPL-MCNC: 148 MG/DL (ref 70–99)
HCT VFR BLD AUTO: 38 % (ref 40–53)
HDLC SERPL-MCNC: 35 MG/DL
HGB BLD-MCNC: 11.9 G/DL (ref 13.3–17.7)
LDLC SERPL CALC-MCNC: 50 MG/DL
MCH RBC QN AUTO: 31.6 PG (ref 26.5–33)
MCHC RBC AUTO-ENTMCNC: 31.3 G/DL (ref 31.5–36.5)
MCV RBC AUTO: 101 FL (ref 78–100)
NONHDLC SERPL-MCNC: 93 MG/DL
PLATELET # BLD AUTO: 172 10E3/UL (ref 150–450)
POTASSIUM SERPL-SCNC: 4 MMOL/L (ref 3.4–5.3)
PROT SERPL-MCNC: 7.2 G/DL (ref 6.4–8.3)
RBC # BLD AUTO: 3.76 10E6/UL (ref 4.4–5.9)
SODIUM SERPL-SCNC: 139 MMOL/L (ref 136–145)
TRIGL SERPL-MCNC: 217 MG/DL
URATE SERPL-MCNC: 6.3 MG/DL (ref 3.4–7)
VIT B12 SERPL-MCNC: 813 PG/ML (ref 232–1245)
WBC # BLD AUTO: 7.7 10E3/UL (ref 4–11)

## 2022-12-12 PROCEDURE — 85027 COMPLETE CBC AUTOMATED: CPT | Mod: ORL | Performed by: PHYSICIAN ASSISTANT

## 2022-12-12 PROCEDURE — 80061 LIPID PANEL: CPT | Mod: ORL | Performed by: PHYSICIAN ASSISTANT

## 2022-12-12 PROCEDURE — 84550 ASSAY OF BLOOD/URIC ACID: CPT | Mod: ORL | Performed by: PHYSICIAN ASSISTANT

## 2022-12-12 PROCEDURE — 80053 COMPREHEN METABOLIC PANEL: CPT | Mod: ORL | Performed by: PHYSICIAN ASSISTANT

## 2022-12-12 PROCEDURE — P9604 ONE-WAY ALLOW PRORATED TRIP: HCPCS | Mod: ORL | Performed by: PHYSICIAN ASSISTANT

## 2022-12-12 PROCEDURE — 82728 ASSAY OF FERRITIN: CPT | Mod: ORL | Performed by: PHYSICIAN ASSISTANT

## 2022-12-12 PROCEDURE — 82306 VITAMIN D 25 HYDROXY: CPT | Mod: ORL | Performed by: PHYSICIAN ASSISTANT

## 2022-12-12 PROCEDURE — 82607 VITAMIN B-12: CPT | Mod: ORL | Performed by: PHYSICIAN ASSISTANT

## 2022-12-12 PROCEDURE — 36415 COLL VENOUS BLD VENIPUNCTURE: CPT | Mod: ORL | Performed by: PHYSICIAN ASSISTANT

## 2022-12-22 NOTE — TELEPHONE ENCOUNTER
Routed to provider. Please see message below. Family requesting home care through [facility]. Thank you.   Mile Bird RN     0

## 2023-01-05 ENCOUNTER — VIRTUAL VISIT (OUTPATIENT)
Dept: FAMILY MEDICINE | Facility: CLINIC | Age: 88
End: 2023-01-05
Payer: COMMERCIAL

## 2023-01-05 DIAGNOSIS — H91.93 HEARING PROBLEM OF BOTH EARS: Primary | ICD-10-CM

## 2023-01-05 PROCEDURE — 99441 PR PHYSICIAN TELEPHONE EVALUATION 5-10 MIN: CPT | Mod: 95 | Performed by: FAMILY MEDICINE

## 2023-01-05 NOTE — PROGRESS NOTES
Israel is a 87 year old who is being evaluated via a billable telephone visit.      What phone number would you like to be contacted at? [phone]  How would you like to obtain your AVS? Javno  Distant Location (provider location):  On-site    ICD-10-CM    1. Hearing problem of both ears  H91.93 Adult Audiology [facility] Referral        Patient had an appoint at [facility] for hearing test, I did place a referral to [place] as we have out own audiology group  He will solomon and schedule  He considers Dr Hoyt his pcp, he is due for preventive visit, advised follow up with her    Subjective   Israel is a 87 year old}, presenting for the following health issues:  Referral (Audiologist/)  he has had wax removed and did not help with his hearing   No pain, no headache, no neck pain      HPI     Patient needs referral for hearing testing.           Review of Systems   Constitutional, HEENT, cardiovascular, pulmonary, GI, , musculoskeletal, neuro, skin, endocrine and psych systems are negative, except as otherwise noted.      Objective           Vitals:  No vitals were obtained today due to virtual visit.    Physical Exam   healthy, alert and no distress  PSYCH: Alert and oriented times 3; coherent speech, normal   rate and volume, able to articulate logical thoughts, able   to abstract reason, no tangential thoughts, no hallucinations   or delusions  His affect is normal  RESP: No cough, no audible wheezing, able to talk in full sentences  Remainder of exam unable to be completed due to telephone visits                Phone call duration: 8 minutes

## 2023-01-10 NOTE — TELEPHONE ENCOUNTER
Med rec completed placed in provider sign me folder  Mel Rasmussen,Clinic Rn  [facility] Rowe     Labs/Imaging Studies/Medications Labs/EKG/Imaging Studies/Medications

## 2023-02-18 ENCOUNTER — LAB REQUISITION (OUTPATIENT)
Dept: LAB | Facility: CLINIC | Age: 88
End: 2023-02-18
Payer: COMMERCIAL

## 2023-02-18 DIAGNOSIS — R35.0 FREQUENCY OF MICTURITION: ICD-10-CM

## 2023-02-20 LAB — PSA SERPL-MCNC: 0.59 NG/ML

## 2023-02-20 PROCEDURE — G0103 PSA SCREENING: HCPCS | Mod: ORL | Performed by: FAMILY MEDICINE

## 2023-02-20 PROCEDURE — 36415 COLL VENOUS BLD VENIPUNCTURE: CPT | Mod: ORL | Performed by: FAMILY MEDICINE

## 2023-02-20 PROCEDURE — P9604 ONE-WAY ALLOW PRORATED TRIP: HCPCS | Mod: ORL | Performed by: FAMILY MEDICINE

## 2023-02-23 ENCOUNTER — LAB REQUISITION (OUTPATIENT)
Dept: LAB | Facility: CLINIC | Age: 88
End: 2023-02-23
Payer: COMMERCIAL

## 2023-02-23 DIAGNOSIS — R35.0 FREQUENCY OF MICTURITION: ICD-10-CM

## 2023-02-23 LAB
ALBUMIN UR-MCNC: NEGATIVE MG/DL
APPEARANCE UR: CLEAR
BILIRUB UR QL STRIP: NEGATIVE
COLOR UR AUTO: ABNORMAL
GLUCOSE UR STRIP-MCNC: NEGATIVE MG/DL
HGB UR QL STRIP: NEGATIVE
KETONES UR STRIP-MCNC: NEGATIVE MG/DL
LEUKOCYTE ESTERASE UR QL STRIP: ABNORMAL
NITRATE UR QL: NEGATIVE
PH UR STRIP: 5 [PH] (ref 5–7)
RBC URINE: 1 /HPF
SP GR UR STRIP: 1.02 (ref 1–1.03)
SQUAMOUS EPITHELIAL: <1 /HPF
TRANSITIONAL EPI: <1 /HPF
UROBILINOGEN UR STRIP-MCNC: NORMAL MG/DL
WBC URINE: 2 /HPF

## 2023-02-23 PROCEDURE — 87086 URINE CULTURE/COLONY COUNT: CPT | Mod: ORL | Performed by: FAMILY MEDICINE

## 2023-02-23 PROCEDURE — 81001 URINALYSIS AUTO W/SCOPE: CPT | Mod: ORL | Performed by: FAMILY MEDICINE

## 2023-02-25 LAB — BACTERIA UR CULT: NO GROWTH

## 2023-02-27 ENCOUNTER — TELEPHONE (OUTPATIENT)
Dept: FAMILY MEDICINE | Facility: CLINIC | Age: 88
End: 2023-02-27
Payer: COMMERCIAL

## 2023-02-27 NOTE — LETTER
March 10, 2023      Israel Moyer  [address] [address] [address]          Dear Israel Moyer,     Your clinic record indicates that you are due for an Annual Medicare Wellness visit. Please call the  at [phone] to schedule an appointment.         If you have questions about this request please contact your provider.     Sincerely,     Your [facility] Team

## 2023-02-27 NOTE — TELEPHONE ENCOUNTER
Patient Quality Outreach    Patient is due for the following:   Hypertension -  Hypertension follow-up visit  Physical Annual Wellness Visit    Next Steps:   Schedule a Annual Wellness Visit    Type of outreach:    Sent [employer] message.     Sent letter 3/10/23      Questions for provider review:    None     Karen Zuniga CMA

## 2023-04-15 ENCOUNTER — HEALTH MAINTENANCE LETTER (OUTPATIENT)
Age: 88
End: 2023-04-15

## 2023-06-21 NOTE — TELEPHONE ENCOUNTER
"Lars Gutierrez is a 37 y o  female who presents today for annual GYN exam   Her last pap smear was performed 2022 and result was NILM, HR-HPV negative  She reports no history of abnormal pap smears in her past  She has not received the HPV vaccine in the past   She reports menses as normal   Patient's last menstrual period was 2023  She reports occasional deep pelvic pain  Her general medical history has been reviewed and she reports it as follows:    Past Medical History:   Diagnosis Date   • Varicella     disease as a child     Past Surgical History:   Procedure Laterality Date   •  SECTION     • COSMETIC SURGERY      Pt states \" Sterling Hanane Lift\"   • LIPOSUCTION     • [place]  DELIVERY ONLY N/A 2023    Procedure:  SECTION () REPEAT;  Surgeon: Vannesa Posada MD;  [facility]: [facility];  Service: Obstetrics   • [place] INSERTION INTRAUTERINE DEVICE IUD N/A 2023    Procedure: INSERTION OF INTRAUTERINE DEVICE (IUD); Surgeon: Vannesa Posada MD;  Location: [facility];  Service: Obstetrics     OB History        5    Para   3    Term   3            AB   2    Living   3       SAB        IAB   2    Ectopic        Multiple   0    Live Births   3               Social History     Tobacco Use   • Smoking status: Former     Types: Cigarettes     Quit date: 2019     Years since quittin 4   • Smokeless tobacco: Never   • Tobacco comments:     Former smoker - As per [facility]   • Vaping Use: Never used   Substance Use Topics   • Alcohol use: Never   • Drug use: Not Currently     Types: Marijuana     Social History     Substance and Sexual Activity   Sexual Activity Yes   • Partners: Male   • Birth control/protection: None     Cancer-related family history is negative for Breast cancer, Colon cancer, and Ovarian cancer      Current Outpatient Medications   Medication Instructions   • acetaminophen (TYLENOL) 1,000 mg, Every " Forms received from [facility]/ Revision to plan of care # [id number] (cert period: 8/31/21 to 10/29/21) for Mary Beth Hoyt DO.  Forms placed in provider 'sign me' folder.  Please fax forms to [phone] after completion.    Manny Benson RT (R) (ARRT)  Radiology Dept   "6 hours PRN   • docusate sodium (COLACE) 100 mg, Oral, 2 times daily   • ferrous sulfate (MISHA-IN-SOL) 75 (15 Fe) mg/mL drops 30 mg of iron, Oral, Daily   • ibuprofen (MOTRIN) 600 mg, Oral, Every 6 hours PRN   • ondansetron (ZOFRAN) 4 mg, Oral, Every 8 hours PRN   • Prenatal Vit-Fe Fumarate-FA (PRENATAL VITAMINS PO) Take by mouth       Review of Systems:  Review of Systems   Constitutional: Negative  Gastrointestinal: Negative  Genitourinary: Negative  Skin: Negative  Physical Exam:  /87 (BP Location: Left arm)   Ht 5' 6\" (1 676 m)   Wt 69 9 kg (154 lb)   LMP 06/03/2023   BMI 24 86 kg/m²   Physical Exam  Constitutional:       General: She is not in acute distress  Appearance: Normal appearance  Genitourinary:      Vulva and bladder normal       No lesions in the vagina  No vaginal erythema or ulceration  Right Adnexa: not tender and no mass present  Left Adnexa: not tender and no mass present  No cervical motion tenderness or lesion  Uterus is not enlarged or tender  No uterine mass detected  Breasts:     Right: No mass, nipple discharge or skin change  Left: No mass, nipple discharge or skin change  Cardiovascular:      Rate and Rhythm: Normal rate and regular rhythm  Pulmonary:      Effort: Pulmonary effort is normal       Breath sounds: Normal breath sounds  Abdominal:      General: Abdomen is flat  Palpations: Abdomen is soft  Musculoskeletal:      Cervical back: Neck supple  Neurological:      Mental Status: She is alert  Skin:     General: Skin is warm and dry  Psychiatric:         Mood and Affect: Mood normal          Behavior: Behavior normal    Vitals reviewed  Assessment/Plan:   1  Normal well-woman GYN exam   2  Cervical cancer screening:  Normal cervical exam   Pap smear up to date  Has received HPV vaccine in the past  She would like to receive this  First dose given today      3  STD screening:  Patient " declines  4  Breast cancer screening:  Normal breast exam   Reviewed breast self-awareness  5  Depression Screening: Patient's depression screening was assessed with a PHQ-2 score of 1  Their PHQ-9 score was 2  Clinically patient does not have depression  No treatment is required  6  BMI Counseling: Body mass index is 24 86 kg/m²  Discussed the patient's BMI with her  The BMI is normal     7  Contraception:  Not currently sexually active  8  Pelvic US ordered    9  Return to office in one year for annual exam or sooner if needed  Reviewed with patient that [facility], but that they will not necessarily be reviewed by me immediately  Explained that I will review results at my earliest opportunity and contact patient appropriately

## 2023-06-28 NOTE — MR AVS SNAPSHOT
Israel Moyer   2/1/2018 8:45 AM   Anticoagulation Therapy Visit    Description:  82 year old male   Provider:  NE ANTI EDDY   Department:  Ne Nurse           INR as of 2/1/2018     Today's INR 2.8      Anticoagulation Summary as of 2/1/2018     INR goal 2.0-3.0   Today's INR 2.8   Full instructions 5 mg on Mon; 2.5 mg all other days   Next INR check 2/15/2018    Indications   Long term current use of anticoagulant therapy [Z79.01]  ATRIAL FIBRILLATION [I48.91]         Your next Anticoagulation Clinic appointment(s)     Feb 15, 2018  8:45 AM CST   [facility]    [address] [place] [id number] [phone]              Contact Numbers     Please call [phone] to cancel and/or reschedule your appointment.  Please call [phone] with any problems or questions regarding your therapy          February 2018 Details    Sun Mon Tue Wed Thu Fri Sat         1      2.5 mg   See details      2      2.5 mg         3      2.5 mg           4      2.5 mg         5      5 mg         6      2.5 mg         7      2.5 mg         8      2.5 mg         9      2.5 mg         10      2.5 mg           11      2.5 mg         12      5 mg         13      2.5 mg         14      2.5 mg         15            16               17                 18               19               20               21               22               23               24                 25               26               27               28                   Date Details   02/01 This INR check       Date of next INR:  2/15/2018         How to take your warfarin dose     To take:  2.5 mg Take 1 of the 2.5 mg tablets.    To take:  5 mg Take 2 of the 2.5 mg tablets.            Rotation Flap Text: The defect edges were debeveled with a #15 scalpel blade.  Given the location of the defect, shape of the defect and the proximity to free margins a rotation flap was deemed most appropriate.  Using a sterile surgical marker, an appropriate rotation flap was drawn incorporating the defect and placing the expected incisions within the relaxed skin tension lines where possible.    The area thus outlined was incised deep to adipose tissue with a #15 scalpel blade.  The skin margins were undermined to an appropriate distance in all directions utilizing iris scissors.

## 2023-07-01 ENCOUNTER — LAB REQUISITION (OUTPATIENT)
Dept: LAB | Facility: CLINIC | Age: 88
End: 2023-07-01
Payer: COMMERCIAL

## 2023-07-01 DIAGNOSIS — E07.9 DISORDER OF THYROID, UNSPECIFIED: ICD-10-CM

## 2023-07-01 DIAGNOSIS — N40.0 BENIGN PROSTATIC HYPERPLASIA WITHOUT LOWER URINARY TRACT SYMPTOMS: ICD-10-CM

## 2023-07-01 DIAGNOSIS — D64.9 ANEMIA, UNSPECIFIED: ICD-10-CM

## 2023-07-01 DIAGNOSIS — E53.8 DEFICIENCY OF OTHER SPECIFIED B GROUP VITAMINS: ICD-10-CM

## 2023-07-01 DIAGNOSIS — M10.9 GOUT, UNSPECIFIED: ICD-10-CM

## 2023-07-01 DIAGNOSIS — E55.9 VITAMIN D DEFICIENCY, UNSPECIFIED: ICD-10-CM

## 2023-07-03 LAB
DEPRECATED CALCIDIOL+CALCIFEROL SERPL-MC: 33 UG/L (ref 20–75)
ERYTHROCYTE [DISTWIDTH] IN BLOOD BY AUTOMATED COUNT: 14.6 % (ref 10–15)
FERRITIN SERPL-MCNC: 585 NG/ML (ref 31–409)
HCT VFR BLD AUTO: 33.7 % (ref 40–53)
HGB BLD-MCNC: 10.7 G/DL (ref 13.3–17.7)
MCH RBC QN AUTO: 32.3 PG (ref 26.5–33)
MCHC RBC AUTO-ENTMCNC: 31.8 G/DL (ref 31.5–36.5)
MCV RBC AUTO: 102 FL (ref 78–100)
PLATELET # BLD AUTO: 170 10E3/UL (ref 150–450)
PSA SERPL DL<=0.01 NG/ML-MCNC: 0.32 NG/ML
RBC # BLD AUTO: 3.31 10E6/UL (ref 4.4–5.9)
TSH SERPL DL<=0.005 MIU/L-ACNC: 7.21 UIU/ML (ref 0.3–4.2)
URATE SERPL-MCNC: 6.6 MG/DL (ref 3.4–7)
VIT B12 SERPL-MCNC: 788 PG/ML (ref 232–1245)
WBC # BLD AUTO: 6.8 10E3/UL (ref 4–11)

## 2023-07-03 PROCEDURE — 84550 ASSAY OF BLOOD/URIC ACID: CPT | Mod: ORL | Performed by: PHYSICIAN ASSISTANT

## 2023-07-03 PROCEDURE — P9603 ONE-WAY ALLOW PRORATED MILES: HCPCS | Mod: ORL | Performed by: PHYSICIAN ASSISTANT

## 2023-07-03 PROCEDURE — 85027 COMPLETE CBC AUTOMATED: CPT | Mod: ORL | Performed by: PHYSICIAN ASSISTANT

## 2023-07-03 PROCEDURE — 82728 ASSAY OF FERRITIN: CPT | Mod: ORL | Performed by: PHYSICIAN ASSISTANT

## 2023-07-03 PROCEDURE — 82306 VITAMIN D 25 HYDROXY: CPT | Mod: ORL | Performed by: PHYSICIAN ASSISTANT

## 2023-07-03 PROCEDURE — 84153 ASSAY OF PSA TOTAL: CPT | Mod: ORL | Performed by: PHYSICIAN ASSISTANT

## 2023-07-03 PROCEDURE — 36415 COLL VENOUS BLD VENIPUNCTURE: CPT | Mod: ORL | Performed by: PHYSICIAN ASSISTANT

## 2023-07-03 PROCEDURE — 84443 ASSAY THYROID STIM HORMONE: CPT | Mod: ORL | Performed by: PHYSICIAN ASSISTANT

## 2023-07-03 PROCEDURE — 82607 VITAMIN B-12: CPT | Mod: ORL | Performed by: PHYSICIAN ASSISTANT

## 2023-08-26 NOTE — MR AVS SNAPSHOT
Israel Moeyr   10/5/2017 9:30 AM   Anticoagulation Therapy Visit    Description:  81 year old male   Provider:  NE ANTI COAWILLIAM   Department:  Ne Nurse           INR as of 10/5/2017     Today's INR 2.6      Anticoagulation Summary as of 10/5/2017     INR goal 2.0-3.0   Today's INR 2.6   Full instructions 5 mg on Mon, Wed; 2.5 mg all other days   Next INR check 11/2/2017    Indications   Long term current use of anticoagulant therapy [Z79.01]  ATRIAL FIBRILLATION [I48.91]         Your next Anticoagulation Clinic appointment(s)     Nov 02, 2017  9:30 AM CDT   Anticoagulation Visit with NE ANTI COAG   [facility]    [address] [place] [id number] [phone]              Contact Numbers     Please call [phone] to cancel and/or reschedule your appointment.  Please call [phone] with any problems or questions regarding your therapy          October 2017 Details    Sun Mon Tue Wed Thu Fri Sat     1               2               3               4               5      2.5 mg   See details      6      2.5 mg         7      2.5 mg           8      2.5 mg         9      5 mg         10      2.5 mg         11      5 mg         12      2.5 mg         13      2.5 mg         14      2.5 mg           15      2.5 mg         16      5 mg         17      2.5 mg         18      5 mg         19      2.5 mg         20      2.5 mg         21      2.5 mg           22      2.5 mg         23      5 mg         24      2.5 mg         25      5 mg         26      2.5 mg         27      2.5 mg         28      2.5 mg           29      2.5 mg         30      5 mg         31      2.5 mg              Date Details   10/05 This INR check               How to take your warfarin dose     To take:  2.5 mg Take 1 of the 2.5 mg tablets.    To take:  5 mg Take 2 of the 2.5 mg tablets.           November 2017 Details    Sun Mon Tue Wed Thu Fri Sat        1      5 mg          2            3               4                 5               6               7               8               9               10               11                 12               13               14               15               16               17               18                 19               20               21               22               23               24               25                 26               27               28               29               30                  Date Details   No additional details    Date of next INR:  11/2/2017         How to take your warfarin dose     To take:  2.5 mg Take 1 of the 2.5 mg tablets.    To take:  5 mg Take 2 of the 2.5 mg tablets.            Cindy

## 2023-09-10 ENCOUNTER — HEALTH MAINTENANCE LETTER (OUTPATIENT)
Age: 88
End: 2023-09-10

## 2023-09-10 ENCOUNTER — LAB REQUISITION (OUTPATIENT)
Dept: LAB | Facility: CLINIC | Age: 88
End: 2023-09-10
Payer: COMMERCIAL

## 2023-09-10 DIAGNOSIS — E07.9 DISORDER OF THYROID, UNSPECIFIED: ICD-10-CM

## 2023-09-11 LAB
T4 FREE SERPL-MCNC: 1.02 NG/DL (ref 0.9–1.7)
TSH SERPL DL<=0.005 MIU/L-ACNC: 3.95 UIU/ML (ref 0.3–4.2)

## 2023-09-11 PROCEDURE — P9603 ONE-WAY ALLOW PRORATED MILES: HCPCS | Mod: ORL | Performed by: PHYSICIAN ASSISTANT

## 2023-09-11 PROCEDURE — 84439 ASSAY OF FREE THYROXINE: CPT | Mod: ORL | Performed by: PHYSICIAN ASSISTANT

## 2023-09-11 PROCEDURE — 36415 COLL VENOUS BLD VENIPUNCTURE: CPT | Mod: ORL | Performed by: PHYSICIAN ASSISTANT

## 2023-09-11 PROCEDURE — 84443 ASSAY THYROID STIM HORMONE: CPT | Mod: ORL | Performed by: PHYSICIAN ASSISTANT

## 2023-10-03 ENCOUNTER — TELEPHONE (OUTPATIENT)
Dept: FAMILY MEDICINE | Facility: CLINIC | Age: 88
End: 2023-10-03
Payer: COMMERCIAL

## 2023-10-03 NOTE — TELEPHONE ENCOUNTER
Patient Quality Outreach    Patient is due for the following:   Hypertension -  Hypertension follow-up visit  Physical Annual Wellness Visit    Next Steps:   Schedule a Annual Wellness Visit    Type of outreach:    Sent [employer] message.      Questions for provider review:    None           Karen Zuniga CMA  Chart routed to Care Team.

## 2023-10-03 NOTE — LETTER
October 3, 2023      Israel Moyer  [address] [address] [address]            Dear Israel Moyer,    Your clinic record indicates that you are due for a Medicare Annual Wellness Visit. Please call the  at [phone] to schedule an appointment.     If you have questions about this request please contact your provider.    Sincerely,    Your [facility] Team

## 2023-10-20 NOTE — TELEPHONE ENCOUNTER
Sury from [facility] called and left lengthy voicemail regarding what is needed for patient to have home care services.    She is setting up services through [facility] and is using an Alternative Care Waiver.    Dr Shukla please have referral order for [facility] for services to include  Home health aid 3 times a week and a homemaker services to help with house hold tasks  HOMECARE REFERRAL PENDED if agreeable.      Dr Gao seen patient last 10/12/19       When complete send back to RN pool.    Fax number for [facility] [phone]  Phone : [phone]    Lucila Castro RN   16

## 2023-11-17 ENCOUNTER — LAB REQUISITION (OUTPATIENT)
Dept: LAB | Facility: CLINIC | Age: 88
End: 2023-11-17
Payer: COMMERCIAL

## 2023-11-17 DIAGNOSIS — E11.65 TYPE 2 DIABETES MELLITUS WITH HYPERGLYCEMIA (H): ICD-10-CM

## 2023-12-13 ENCOUNTER — TELEPHONE (OUTPATIENT)
Dept: PHARMACY | Facility: OTHER | Age: 88
End: 2023-12-13
Payer: COMMERCIAL

## 2023-12-13 NOTE — TELEPHONE ENCOUNTER
MTM Recruitment: [facility] insurance     Referral outreach attempt #1 on December 13, 2023      Outcome: left voicemail    Deloris Mckeon, MariajoseD  Medication Therapy Management Pharmacist

## 2023-12-27 ENCOUNTER — LAB REQUISITION (OUTPATIENT)
Dept: LAB | Facility: CLINIC | Age: 88
End: 2023-12-27
Payer: COMMERCIAL

## 2023-12-27 DIAGNOSIS — E03.9 HYPOTHYROIDISM, UNSPECIFIED: ICD-10-CM

## 2023-12-27 DIAGNOSIS — M10.9 GOUT, UNSPECIFIED: ICD-10-CM

## 2023-12-27 DIAGNOSIS — E11.65 TYPE 2 DIABETES MELLITUS WITH HYPERGLYCEMIA (H): ICD-10-CM

## 2023-12-27 DIAGNOSIS — N18.31 CHRONIC KIDNEY DISEASE, STAGE 3A (H): ICD-10-CM

## 2023-12-27 DIAGNOSIS — D50.9 IRON DEFICIENCY ANEMIA, UNSPECIFIED: ICD-10-CM

## 2023-12-28 LAB
BASOPHILS # BLD AUTO: 0.1 10E3/UL (ref 0–0.2)
BASOPHILS NFR BLD AUTO: 1 %
EOSINOPHIL # BLD AUTO: 0.3 10E3/UL (ref 0–0.7)
EOSINOPHIL NFR BLD AUTO: 3 %
ERYTHROCYTE [DISTWIDTH] IN BLOOD BY AUTOMATED COUNT: 15.6 % (ref 10–15)
HBA1C MFR BLD: 6.1 %
HCT VFR BLD AUTO: 36.1 % (ref 40–53)
HGB BLD-MCNC: 11.3 G/DL (ref 13.3–17.7)
IMM GRANULOCYTES # BLD: 0 10E3/UL
IMM GRANULOCYTES NFR BLD: 0 %
LYMPHOCYTES # BLD AUTO: 1.7 10E3/UL (ref 0.8–5.3)
LYMPHOCYTES NFR BLD AUTO: 18 %
MCH RBC QN AUTO: 30.6 PG (ref 26.5–33)
MCHC RBC AUTO-ENTMCNC: 31.3 G/DL (ref 31.5–36.5)
MCV RBC AUTO: 98 FL (ref 78–100)
MONOCYTES # BLD AUTO: 0.6 10E3/UL (ref 0–1.3)
MONOCYTES NFR BLD AUTO: 6 %
NEUTROPHILS # BLD AUTO: 6.8 10E3/UL (ref 1.6–8.3)
NEUTROPHILS NFR BLD AUTO: 72 %
NRBC # BLD AUTO: 0 10E3/UL
NRBC BLD AUTO-RTO: 0 /100
PLATELET # BLD AUTO: 226 10E3/UL (ref 150–450)
RBC # BLD AUTO: 3.69 10E6/UL (ref 4.4–5.9)
T4 SERPL-MCNC: 4.6 UG/DL (ref 4.5–11.7)
TSH SERPL DL<=0.005 MIU/L-ACNC: 3.81 UIU/ML (ref 0.3–4.2)
URATE SERPL-MCNC: 7.1 MG/DL (ref 3.4–7)
WBC # BLD AUTO: 9.4 10E3/UL (ref 4–11)

## 2023-12-28 PROCEDURE — 84443 ASSAY THYROID STIM HORMONE: CPT | Mod: ORL | Performed by: PHYSICIAN ASSISTANT

## 2023-12-28 PROCEDURE — 84436 ASSAY OF TOTAL THYROXINE: CPT | Mod: ORL | Performed by: PHYSICIAN ASSISTANT

## 2023-12-28 PROCEDURE — 36415 COLL VENOUS BLD VENIPUNCTURE: CPT | Mod: ORL | Performed by: PHYSICIAN ASSISTANT

## 2023-12-28 PROCEDURE — P9603 ONE-WAY ALLOW PRORATED MILES: HCPCS | Mod: ORL | Performed by: PHYSICIAN ASSISTANT

## 2023-12-28 PROCEDURE — 83036 HEMOGLOBIN GLYCOSYLATED A1C: CPT | Mod: ORL | Performed by: PHYSICIAN ASSISTANT

## 2023-12-28 PROCEDURE — 84550 ASSAY OF BLOOD/URIC ACID: CPT | Mod: ORL | Performed by: PHYSICIAN ASSISTANT

## 2023-12-28 PROCEDURE — 85025 COMPLETE CBC W/AUTO DIFF WBC: CPT | Mod: ORL | Performed by: PHYSICIAN ASSISTANT

## 2024-01-16 NOTE — TELEPHONE ENCOUNTER
MTM referral from: Patient's insurance      MTM referral outreach attempt #2 on January 16, 2024      Outcome: left message    Genevieve Jefferson PharmD  Medication Therapy Management Pharmacist  [facility]

## 2024-01-18 ENCOUNTER — TELEPHONE (OUTPATIENT)
Dept: PHARMACY | Facility: CLINIC | Age: 89
End: 2024-01-18
Payer: COMMERCIAL

## 2024-01-18 NOTE — TELEPHONE ENCOUNTER
MTM referral from: insurance plan    MTM referral outreach attempt #3 on January 18, 2024 at 1:36 PM      Outcome: Patient scheduled for MTM appointment on 1/25/24 - facility administers his meds.  Asked him to have them print a list so that he has that available for 1/25 visit.    Luli Johnson, Pharm.D.,[facility] [place]  Board Certified Geriatric Pharmacist  Medication Therapy Management Pharmacist  [phone]

## 2024-03-21 ENCOUNTER — LAB REQUISITION (OUTPATIENT)
Dept: LAB | Facility: CLINIC | Age: 89
End: 2024-03-21
Payer: COMMERCIAL

## 2024-03-21 DIAGNOSIS — T84.53XD INFECTION AND INFLAMMATORY REACTION DUE TO INTERNAL RIGHT KNEE PROSTHESIS, SUBSEQUENT ENCOUNTER: ICD-10-CM

## 2024-03-21 DIAGNOSIS — T81.49XD INFECTION FOLLOWING A PROCEDURE, OTHER SURGICAL SITE, SUBSEQUENT ENCOUNTER: ICD-10-CM

## 2024-03-21 LAB — VANCOMYCIN SERPL-MCNC: 15 UG/ML

## 2024-03-21 PROCEDURE — 80202 ASSAY OF VANCOMYCIN: CPT | Mod: ORL | Performed by: FAMILY MEDICINE

## 2024-03-21 PROCEDURE — P9603 ONE-WAY ALLOW PRORATED MILES: HCPCS | Mod: ORL | Performed by: FAMILY MEDICINE

## 2024-03-21 PROCEDURE — 36415 COLL VENOUS BLD VENIPUNCTURE: CPT | Mod: ORL | Performed by: FAMILY MEDICINE

## 2024-03-25 LAB
AST SERPL W P-5'-P-CCNC: 26 U/L (ref 0–45)
BASOPHILS # BLD AUTO: 0.1 10E3/UL (ref 0–0.2)
BASOPHILS NFR BLD AUTO: 1 %
CREAT SERPL-MCNC: 1.5 MG/DL (ref 0.67–1.17)
CRP SERPL-MCNC: 77.3 MG/L
EGFRCR SERPLBLD CKD-EPI 2021: 45 ML/MIN/1.73M2
EOSINOPHIL # BLD AUTO: 0.2 10E3/UL (ref 0–0.7)
EOSINOPHIL NFR BLD AUTO: 2 %
ERYTHROCYTE [DISTWIDTH] IN BLOOD BY AUTOMATED COUNT: 16.2 % (ref 10–15)
ERYTHROCYTE [SEDIMENTATION RATE] IN BLOOD BY WESTERGREN METHOD: 103 MM/HR (ref 0–20)
HCT VFR BLD AUTO: 30.1 % (ref 40–53)
HGB BLD-MCNC: 9.1 G/DL (ref 13.3–17.7)
IMM GRANULOCYTES # BLD: 0.2 10E3/UL
IMM GRANULOCYTES NFR BLD: 2 %
LYMPHOCYTES # BLD AUTO: 1.2 10E3/UL (ref 0.8–5.3)
LYMPHOCYTES NFR BLD AUTO: 13 %
MCH RBC QN AUTO: 29.5 PG (ref 26.5–33)
MCHC RBC AUTO-ENTMCNC: 30.2 G/DL (ref 31.5–36.5)
MCV RBC AUTO: 98 FL (ref 78–100)
MONOCYTES # BLD AUTO: 0.6 10E3/UL (ref 0–1.3)
MONOCYTES NFR BLD AUTO: 6 %
NEUTROPHILS # BLD AUTO: 7.1 10E3/UL (ref 1.6–8.3)
NEUTROPHILS NFR BLD AUTO: 76 %
NRBC # BLD AUTO: 0 10E3/UL
NRBC BLD AUTO-RTO: 0 /100
PLATELET # BLD AUTO: 425 10E3/UL (ref 150–450)
RBC # BLD AUTO: 3.08 10E6/UL (ref 4.4–5.9)
WBC # BLD AUTO: 9.4 10E3/UL (ref 4–11)

## 2024-03-25 PROCEDURE — 86140 C-REACTIVE PROTEIN: CPT | Mod: ORL | Performed by: FAMILY MEDICINE

## 2024-03-25 PROCEDURE — 85025 COMPLETE CBC W/AUTO DIFF WBC: CPT | Mod: ORL | Performed by: FAMILY MEDICINE

## 2024-03-25 PROCEDURE — 82565 ASSAY OF CREATININE: CPT | Mod: ORL | Performed by: FAMILY MEDICINE

## 2024-03-25 PROCEDURE — 36415 COLL VENOUS BLD VENIPUNCTURE: CPT | Mod: ORL | Performed by: FAMILY MEDICINE

## 2024-03-25 PROCEDURE — 85652 RBC SED RATE AUTOMATED: CPT | Mod: ORL | Performed by: FAMILY MEDICINE

## 2024-03-25 PROCEDURE — P9604 ONE-WAY ALLOW PRORATED TRIP: HCPCS | Mod: ORL | Performed by: FAMILY MEDICINE

## 2024-03-25 PROCEDURE — 84450 TRANSFERASE (AST) (SGOT): CPT | Mod: ORL | Performed by: FAMILY MEDICINE

## 2024-03-26 ENCOUNTER — LAB REQUISITION (OUTPATIENT)
Dept: LAB | Facility: CLINIC | Age: 89
End: 2024-03-26
Payer: COMMERCIAL

## 2024-03-26 DIAGNOSIS — T84.53XD INFECTION AND INFLAMMATORY REACTION DUE TO INTERNAL RIGHT KNEE PROSTHESIS, SUBSEQUENT ENCOUNTER: ICD-10-CM

## 2024-03-27 LAB — VANCOMYCIN SERPL-MCNC: 19.8 UG/ML

## 2024-03-27 PROCEDURE — 80202 ASSAY OF VANCOMYCIN: CPT | Mod: ORL | Performed by: FAMILY MEDICINE

## 2024-03-27 PROCEDURE — 36415 COLL VENOUS BLD VENIPUNCTURE: CPT | Mod: ORL | Performed by: FAMILY MEDICINE

## 2024-03-27 PROCEDURE — P9603 ONE-WAY ALLOW PRORATED MILES: HCPCS | Mod: ORL | Performed by: FAMILY MEDICINE

## 2024-04-01 ENCOUNTER — LAB REQUISITION (OUTPATIENT)
Dept: LAB | Facility: CLINIC | Age: 89
End: 2024-04-01
Payer: COMMERCIAL

## 2024-04-01 DIAGNOSIS — T84.53XD INFECTION AND INFLAMMATORY REACTION DUE TO INTERNAL RIGHT KNEE PROSTHESIS, SUBSEQUENT ENCOUNTER: ICD-10-CM

## 2024-04-03 LAB
AST SERPL W P-5'-P-CCNC: 18 U/L (ref 0–45)
BASOPHILS # BLD AUTO: 0.1 10E3/UL (ref 0–0.2)
BASOPHILS NFR BLD AUTO: 1 %
CREAT SERPL-MCNC: 1.47 MG/DL (ref 0.67–1.17)
CRP SERPL-MCNC: 17.3 MG/L
EGFRCR SERPLBLD CKD-EPI 2021: 46 ML/MIN/1.73M2
EOSINOPHIL # BLD AUTO: 0.3 10E3/UL (ref 0–0.7)
EOSINOPHIL NFR BLD AUTO: 5 %
ERYTHROCYTE [DISTWIDTH] IN BLOOD BY AUTOMATED COUNT: 16.5 % (ref 10–15)
ERYTHROCYTE [SEDIMENTATION RATE] IN BLOOD BY WESTERGREN METHOD: 79 MM/HR (ref 0–20)
HCT VFR BLD AUTO: 28.4 % (ref 40–53)
HGB BLD-MCNC: 8.5 G/DL (ref 13.3–17.7)
IMM GRANULOCYTES # BLD: 0 10E3/UL
IMM GRANULOCYTES NFR BLD: 1 %
LYMPHOCYTES # BLD AUTO: 1.2 10E3/UL (ref 0.8–5.3)
LYMPHOCYTES NFR BLD AUTO: 21 %
MCH RBC QN AUTO: 29 PG (ref 26.5–33)
MCHC RBC AUTO-ENTMCNC: 29.9 G/DL (ref 31.5–36.5)
MCV RBC AUTO: 97 FL (ref 78–100)
MONOCYTES # BLD AUTO: 0.5 10E3/UL (ref 0–1.3)
MONOCYTES NFR BLD AUTO: 9 %
NEUTROPHILS # BLD AUTO: 3.5 10E3/UL (ref 1.6–8.3)
NEUTROPHILS NFR BLD AUTO: 63 %
NRBC # BLD AUTO: 0 10E3/UL
NRBC BLD AUTO-RTO: 0 /100
PLATELET # BLD AUTO: 227 10E3/UL (ref 150–450)
RBC # BLD AUTO: 2.93 10E6/UL (ref 4.4–5.9)
VANCOMYCIN SERPL-MCNC: 21.9 UG/ML
WBC # BLD AUTO: 5.5 10E3/UL (ref 4–11)

## 2024-04-03 PROCEDURE — 82565 ASSAY OF CREATININE: CPT | Mod: ORL | Performed by: FAMILY MEDICINE

## 2024-04-03 PROCEDURE — 85652 RBC SED RATE AUTOMATED: CPT | Mod: ORL | Performed by: FAMILY MEDICINE

## 2024-04-03 PROCEDURE — 84450 TRANSFERASE (AST) (SGOT): CPT | Mod: ORL | Performed by: FAMILY MEDICINE

## 2024-04-03 PROCEDURE — 36415 COLL VENOUS BLD VENIPUNCTURE: CPT | Mod: ORL | Performed by: FAMILY MEDICINE

## 2024-04-03 PROCEDURE — 86140 C-REACTIVE PROTEIN: CPT | Mod: ORL | Performed by: FAMILY MEDICINE

## 2024-04-03 PROCEDURE — 85025 COMPLETE CBC W/AUTO DIFF WBC: CPT | Mod: ORL | Performed by: FAMILY MEDICINE

## 2024-04-03 PROCEDURE — 80202 ASSAY OF VANCOMYCIN: CPT | Mod: ORL | Performed by: FAMILY MEDICINE

## 2024-04-03 PROCEDURE — P9603 ONE-WAY ALLOW PRORATED MILES: HCPCS | Mod: ORL | Performed by: FAMILY MEDICINE

## 2024-04-04 ENCOUNTER — LAB REQUISITION (OUTPATIENT)
Dept: LAB | Facility: CLINIC | Age: 89
End: 2024-04-04
Payer: COMMERCIAL

## 2024-04-04 DIAGNOSIS — T84.53XD INFECTION AND INFLAMMATORY REACTION DUE TO INTERNAL RIGHT KNEE PROSTHESIS, SUBSEQUENT ENCOUNTER: ICD-10-CM

## 2024-04-04 DIAGNOSIS — M71.061 ABSCESS OF BURSA, RIGHT KNEE: ICD-10-CM

## 2024-04-05 LAB
ALBUMIN SERPL BCG-MCNC: 3.4 G/DL (ref 3.5–5.2)
ALP SERPL-CCNC: 115 U/L (ref 40–150)
ALT SERPL W P-5'-P-CCNC: 9 U/L (ref 0–70)
ANION GAP SERPL CALCULATED.3IONS-SCNC: 17 MMOL/L (ref 7–15)
AST SERPL W P-5'-P-CCNC: 14 U/L (ref 0–45)
BILIRUB SERPL-MCNC: 0.4 MG/DL
BUN SERPL-MCNC: 35.2 MG/DL (ref 8–23)
CALCIUM SERPL-MCNC: 8.5 MG/DL (ref 8.8–10.2)
CHLORIDE SERPL-SCNC: 98 MMOL/L (ref 98–107)
CREAT SERPL-MCNC: 1.53 MG/DL (ref 0.67–1.17)
DEPRECATED HCO3 PLAS-SCNC: 21 MMOL/L (ref 22–29)
EGFRCR SERPLBLD CKD-EPI 2021: 44 ML/MIN/1.73M2
GLUCOSE SERPL-MCNC: 97 MG/DL (ref 70–99)
POTASSIUM SERPL-SCNC: 4.2 MMOL/L (ref 3.4–5.3)
PROT SERPL-MCNC: 5.7 G/DL (ref 6.4–8.3)
SODIUM SERPL-SCNC: 136 MMOL/L (ref 135–145)

## 2024-04-05 PROCEDURE — 36415 COLL VENOUS BLD VENIPUNCTURE: CPT | Mod: ORL | Performed by: FAMILY MEDICINE

## 2024-04-05 PROCEDURE — P9603 ONE-WAY ALLOW PRORATED MILES: HCPCS | Mod: ORL | Performed by: FAMILY MEDICINE

## 2024-04-05 PROCEDURE — 80053 COMPREHEN METABOLIC PANEL: CPT | Mod: ORL | Performed by: FAMILY MEDICINE

## 2024-04-07 ENCOUNTER — HEALTH MAINTENANCE LETTER (OUTPATIENT)
Age: 89
End: 2024-04-07

## 2024-04-08 ENCOUNTER — LAB REQUISITION (OUTPATIENT)
Dept: LAB | Facility: CLINIC | Age: 89
End: 2024-04-08

## 2024-04-08 ENCOUNTER — LAB REQUISITION (OUTPATIENT)
Dept: LAB | Facility: CLINIC | Age: 89
End: 2024-04-08
Payer: COMMERCIAL

## 2024-04-08 DIAGNOSIS — T84.53XD INFECTION AND INFLAMMATORY REACTION DUE TO INTERNAL RIGHT KNEE PROSTHESIS, SUBSEQUENT ENCOUNTER: ICD-10-CM

## 2024-04-08 LAB
CREAT SERPL-MCNC: 1.45 MG/DL (ref 0.67–1.17)
EGFRCR SERPLBLD CKD-EPI 2021: 47 ML/MIN/1.73M2
VANCOMYCIN SERPL-MCNC: 17.8 UG/ML

## 2024-04-08 PROCEDURE — 82565 ASSAY OF CREATININE: CPT | Mod: ORL | Performed by: FAMILY MEDICINE

## 2024-04-08 PROCEDURE — 36415 COLL VENOUS BLD VENIPUNCTURE: CPT | Mod: ORL | Performed by: FAMILY MEDICINE

## 2024-04-08 PROCEDURE — 80202 ASSAY OF VANCOMYCIN: CPT | Mod: ORL | Performed by: FAMILY MEDICINE

## 2024-04-08 PROCEDURE — P9603 ONE-WAY ALLOW PRORATED MILES: HCPCS | Mod: ORL | Performed by: FAMILY MEDICINE

## 2024-04-10 LAB
AST SERPL W P-5'-P-CCNC: 17 U/L (ref 0–45)
BASOPHILS # BLD AUTO: 0.1 10E3/UL (ref 0–0.2)
BASOPHILS NFR BLD AUTO: 1 %
CREAT SERPL-MCNC: 1.57 MG/DL (ref 0.67–1.17)
CRP SERPL-MCNC: 16.2 MG/L
EGFRCR SERPLBLD CKD-EPI 2021: 43 ML/MIN/1.73M2
EOSINOPHIL # BLD AUTO: 0.3 10E3/UL (ref 0–0.7)
EOSINOPHIL NFR BLD AUTO: 3 %
ERYTHROCYTE [DISTWIDTH] IN BLOOD BY AUTOMATED COUNT: 17.4 % (ref 10–15)
ERYTHROCYTE [SEDIMENTATION RATE] IN BLOOD BY WESTERGREN METHOD: 87 MM/HR (ref 0–20)
HCT VFR BLD AUTO: 30.4 % (ref 40–53)
HGB BLD-MCNC: 9.4 G/DL (ref 13.3–17.7)
IMM GRANULOCYTES # BLD: 0 10E3/UL
IMM GRANULOCYTES NFR BLD: 1 %
LYMPHOCYTES # BLD AUTO: 1.3 10E3/UL (ref 0.8–5.3)
LYMPHOCYTES NFR BLD AUTO: 16 %
MCH RBC QN AUTO: 29.9 PG (ref 26.5–33)
MCHC RBC AUTO-ENTMCNC: 30.9 G/DL (ref 31.5–36.5)
MCV RBC AUTO: 97 FL (ref 78–100)
MONOCYTES # BLD AUTO: 0.7 10E3/UL (ref 0–1.3)
MONOCYTES NFR BLD AUTO: 9 %
NEUTROPHILS # BLD AUTO: 5.6 10E3/UL (ref 1.6–8.3)
NEUTROPHILS NFR BLD AUTO: 70 %
NRBC # BLD AUTO: 0 10E3/UL
NRBC BLD AUTO-RTO: 0 /100
PLATELET # BLD AUTO: 219 10E3/UL (ref 150–450)
RBC # BLD AUTO: 3.14 10E6/UL (ref 4.4–5.9)
WBC # BLD AUTO: 7.9 10E3/UL (ref 4–11)

## 2024-04-10 PROCEDURE — 86140 C-REACTIVE PROTEIN: CPT | Performed by: FAMILY MEDICINE

## 2024-04-10 PROCEDURE — 36415 COLL VENOUS BLD VENIPUNCTURE: CPT | Performed by: FAMILY MEDICINE

## 2024-04-10 PROCEDURE — 84450 TRANSFERASE (AST) (SGOT): CPT | Performed by: FAMILY MEDICINE

## 2024-04-10 PROCEDURE — 82565 ASSAY OF CREATININE: CPT | Performed by: FAMILY MEDICINE

## 2024-04-10 PROCEDURE — 85652 RBC SED RATE AUTOMATED: CPT | Performed by: FAMILY MEDICINE

## 2024-04-10 PROCEDURE — 85025 COMPLETE CBC W/AUTO DIFF WBC: CPT | Performed by: FAMILY MEDICINE

## 2024-04-10 PROCEDURE — P9603 ONE-WAY ALLOW PRORATED MILES: HCPCS | Performed by: FAMILY MEDICINE

## 2024-04-11 LAB
AST SERPL W P-5'-P-CCNC: 16 U/L (ref 0–45)
BASOPHILS # BLD AUTO: 0.1 10E3/UL (ref 0–0.2)
BASOPHILS NFR BLD AUTO: 1 %
CREAT SERPL-MCNC: 1.44 MG/DL (ref 0.67–1.17)
CRP SERPL-MCNC: 13.5 MG/L
EGFRCR SERPLBLD CKD-EPI 2021: 47 ML/MIN/1.73M2
EOSINOPHIL # BLD AUTO: 0.3 10E3/UL (ref 0–0.7)
EOSINOPHIL NFR BLD AUTO: 6 %
ERYTHROCYTE [DISTWIDTH] IN BLOOD BY AUTOMATED COUNT: 17.2 % (ref 10–15)
ERYTHROCYTE [SEDIMENTATION RATE] IN BLOOD BY WESTERGREN METHOD: 84 MM/HR (ref 0–20)
HCT VFR BLD AUTO: 28 % (ref 40–53)
HGB BLD-MCNC: 8.5 G/DL (ref 13.3–17.7)
IMM GRANULOCYTES # BLD: 0 10E3/UL
IMM GRANULOCYTES NFR BLD: 1 %
LYMPHOCYTES # BLD AUTO: 1 10E3/UL (ref 0.8–5.3)
LYMPHOCYTES NFR BLD AUTO: 17 %
MCH RBC QN AUTO: 29.2 PG (ref 26.5–33)
MCHC RBC AUTO-ENTMCNC: 30.4 G/DL (ref 31.5–36.5)
MCV RBC AUTO: 96 FL (ref 78–100)
MONOCYTES # BLD AUTO: 0.6 10E3/UL (ref 0–1.3)
MONOCYTES NFR BLD AUTO: 10 %
NEUTROPHILS # BLD AUTO: 4 10E3/UL (ref 1.6–8.3)
NEUTROPHILS NFR BLD AUTO: 65 %
NRBC # BLD AUTO: 0 10E3/UL
NRBC BLD AUTO-RTO: 0 /100
PLATELET # BLD AUTO: 171 10E3/UL (ref 150–450)
RBC # BLD AUTO: 2.91 10E6/UL (ref 4.4–5.9)
VANCOMYCIN SERPL-MCNC: 13.3 UG/ML
WBC # BLD AUTO: 6.1 10E3/UL (ref 4–11)

## 2024-04-11 PROCEDURE — 80202 ASSAY OF VANCOMYCIN: CPT | Mod: ORL | Performed by: FAMILY MEDICINE

## 2024-04-11 PROCEDURE — 82565 ASSAY OF CREATININE: CPT | Mod: ORL | Performed by: FAMILY MEDICINE

## 2024-04-11 PROCEDURE — P9603 ONE-WAY ALLOW PRORATED MILES: HCPCS | Mod: ORL | Performed by: FAMILY MEDICINE

## 2024-04-11 PROCEDURE — 86140 C-REACTIVE PROTEIN: CPT | Mod: ORL | Performed by: FAMILY MEDICINE

## 2024-04-11 PROCEDURE — 85025 COMPLETE CBC W/AUTO DIFF WBC: CPT | Mod: ORL | Performed by: FAMILY MEDICINE

## 2024-04-11 PROCEDURE — 84450 TRANSFERASE (AST) (SGOT): CPT | Mod: ORL | Performed by: FAMILY MEDICINE

## 2024-04-11 PROCEDURE — 85652 RBC SED RATE AUTOMATED: CPT | Mod: ORL | Performed by: FAMILY MEDICINE

## 2024-04-13 ENCOUNTER — LAB REQUISITION (OUTPATIENT)
Dept: LAB | Facility: CLINIC | Age: 89
End: 2024-04-13
Payer: COMMERCIAL

## 2024-04-13 DIAGNOSIS — T84.53XD INFECTION AND INFLAMMATORY REACTION DUE TO INTERNAL RIGHT KNEE PROSTHESIS, SUBSEQUENT ENCOUNTER: ICD-10-CM

## 2024-04-13 DIAGNOSIS — T81.49XD INFECTION FOLLOWING A PROCEDURE, OTHER SURGICAL SITE, SUBSEQUENT ENCOUNTER: ICD-10-CM

## 2024-04-15 LAB
AST SERPL W P-5'-P-CCNC: 14 U/L (ref 0–45)
BASOPHILS # BLD AUTO: 0.1 10E3/UL (ref 0–0.2)
BASOPHILS NFR BLD AUTO: 1 %
CREAT SERPL-MCNC: 1.66 MG/DL (ref 0.67–1.17)
CRP SERPL-MCNC: 8.06 MG/L
EGFRCR SERPLBLD CKD-EPI 2021: 40 ML/MIN/1.73M2
EOSINOPHIL # BLD AUTO: 0.3 10E3/UL (ref 0–0.7)
EOSINOPHIL NFR BLD AUTO: 5 %
ERYTHROCYTE [DISTWIDTH] IN BLOOD BY AUTOMATED COUNT: 17.3 % (ref 10–15)
ERYTHROCYTE [SEDIMENTATION RATE] IN BLOOD BY WESTERGREN METHOD: 13 MM/HR (ref 0–20)
HCT VFR BLD AUTO: 29.9 % (ref 40–53)
HGB BLD-MCNC: 8.9 G/DL (ref 13.3–17.7)
IMM GRANULOCYTES # BLD: 0 10E3/UL
IMM GRANULOCYTES NFR BLD: 1 %
LYMPHOCYTES # BLD AUTO: 1.1 10E3/UL (ref 0.8–5.3)
LYMPHOCYTES NFR BLD AUTO: 16 %
MCH RBC QN AUTO: 29 PG (ref 26.5–33)
MCHC RBC AUTO-ENTMCNC: 29.8 G/DL (ref 31.5–36.5)
MCV RBC AUTO: 97 FL (ref 78–100)
MONOCYTES # BLD AUTO: 0.4 10E3/UL (ref 0–1.3)
MONOCYTES NFR BLD AUTO: 6 %
NEUTROPHILS # BLD AUTO: 4.6 10E3/UL (ref 1.6–8.3)
NEUTROPHILS NFR BLD AUTO: 71 %
NRBC # BLD AUTO: 0 10E3/UL
NRBC BLD AUTO-RTO: 0 /100
PLATELET # BLD AUTO: 168 10E3/UL (ref 150–450)
RBC # BLD AUTO: 3.07 10E6/UL (ref 4.4–5.9)
VANCOMYCIN SERPL-MCNC: 11.6 UG/ML
WBC # BLD AUTO: 6.4 10E3/UL (ref 4–11)

## 2024-04-15 PROCEDURE — 82565 ASSAY OF CREATININE: CPT | Mod: ORL | Performed by: FAMILY MEDICINE

## 2024-04-15 PROCEDURE — 80202 ASSAY OF VANCOMYCIN: CPT | Mod: ORL | Performed by: FAMILY MEDICINE

## 2024-04-15 PROCEDURE — 85025 COMPLETE CBC W/AUTO DIFF WBC: CPT | Mod: ORL | Performed by: FAMILY MEDICINE

## 2024-04-15 PROCEDURE — 85652 RBC SED RATE AUTOMATED: CPT | Mod: ORL | Performed by: FAMILY MEDICINE

## 2024-04-15 PROCEDURE — 36415 COLL VENOUS BLD VENIPUNCTURE: CPT | Mod: ORL | Performed by: FAMILY MEDICINE

## 2024-04-15 PROCEDURE — 84450 TRANSFERASE (AST) (SGOT): CPT | Mod: ORL | Performed by: FAMILY MEDICINE

## 2024-04-15 PROCEDURE — P9603 ONE-WAY ALLOW PRORATED MILES: HCPCS | Mod: ORL | Performed by: FAMILY MEDICINE

## 2024-04-15 PROCEDURE — 86140 C-REACTIVE PROTEIN: CPT | Mod: ORL | Performed by: FAMILY MEDICINE

## 2024-04-18 ENCOUNTER — LAB REQUISITION (OUTPATIENT)
Dept: LAB | Facility: CLINIC | Age: 89
End: 2024-04-18
Payer: COMMERCIAL

## 2024-04-18 DIAGNOSIS — T84.53XD INFECTION AND INFLAMMATORY REACTION DUE TO INTERNAL RIGHT KNEE PROSTHESIS, SUBSEQUENT ENCOUNTER: ICD-10-CM

## 2024-04-22 LAB
AST SERPL W P-5'-P-CCNC: 15 U/L (ref 0–45)
BASOPHILS # BLD AUTO: 0 10E3/UL (ref 0–0.2)
BASOPHILS NFR BLD AUTO: 1 %
CREAT SERPL-MCNC: 1.45 MG/DL (ref 0.67–1.17)
CRP SERPL-MCNC: 12.7 MG/L
EGFRCR SERPLBLD CKD-EPI 2021: 47 ML/MIN/1.73M2
EOSINOPHIL # BLD AUTO: 0.2 10E3/UL (ref 0–0.7)
EOSINOPHIL NFR BLD AUTO: 4 %
ERYTHROCYTE [DISTWIDTH] IN BLOOD BY AUTOMATED COUNT: 17.4 % (ref 10–15)
ERYTHROCYTE [SEDIMENTATION RATE] IN BLOOD BY WESTERGREN METHOD: 81 MM/HR (ref 0–20)
HCT VFR BLD AUTO: 28.2 % (ref 40–53)
HGB BLD-MCNC: 8.6 G/DL (ref 13.3–17.7)
IMM GRANULOCYTES # BLD: 0 10E3/UL
IMM GRANULOCYTES NFR BLD: 1 %
LYMPHOCYTES # BLD AUTO: 1 10E3/UL (ref 0.8–5.3)
LYMPHOCYTES NFR BLD AUTO: 16 %
MCH RBC QN AUTO: 29.7 PG (ref 26.5–33)
MCHC RBC AUTO-ENTMCNC: 30.5 G/DL (ref 31.5–36.5)
MCV RBC AUTO: 97 FL (ref 78–100)
MONOCYTES # BLD AUTO: 0.5 10E3/UL (ref 0–1.3)
MONOCYTES NFR BLD AUTO: 8 %
NEUTROPHILS # BLD AUTO: 4.1 10E3/UL (ref 1.6–8.3)
NEUTROPHILS NFR BLD AUTO: 70 %
NRBC # BLD AUTO: 0 10E3/UL
NRBC BLD AUTO-RTO: 0 /100
PLATELET # BLD AUTO: 199 10E3/UL (ref 150–450)
RBC # BLD AUTO: 2.9 10E6/UL (ref 4.4–5.9)
VANCOMYCIN SERPL-MCNC: 13.7 UG/ML
WBC # BLD AUTO: 5.8 10E3/UL (ref 4–11)

## 2024-04-22 PROCEDURE — 85652 RBC SED RATE AUTOMATED: CPT | Mod: ORL | Performed by: FAMILY MEDICINE

## 2024-04-22 PROCEDURE — 85025 COMPLETE CBC W/AUTO DIFF WBC: CPT | Mod: ORL | Performed by: FAMILY MEDICINE

## 2024-04-22 PROCEDURE — 82565 ASSAY OF CREATININE: CPT | Mod: ORL | Performed by: FAMILY MEDICINE

## 2024-04-22 PROCEDURE — 86140 C-REACTIVE PROTEIN: CPT | Mod: ORL | Performed by: FAMILY MEDICINE

## 2024-04-22 PROCEDURE — 36415 COLL VENOUS BLD VENIPUNCTURE: CPT | Mod: ORL | Performed by: FAMILY MEDICINE

## 2024-04-22 PROCEDURE — 84450 TRANSFERASE (AST) (SGOT): CPT | Mod: ORL | Performed by: FAMILY MEDICINE

## 2024-04-22 PROCEDURE — P9603 ONE-WAY ALLOW PRORATED MILES: HCPCS | Mod: ORL | Performed by: FAMILY MEDICINE

## 2024-04-22 PROCEDURE — 80202 ASSAY OF VANCOMYCIN: CPT | Mod: ORL | Performed by: FAMILY MEDICINE

## 2024-05-29 ENCOUNTER — PATIENT OUTREACH (OUTPATIENT)
Dept: CARE COORDINATION | Facility: CLINIC | Age: 89
End: 2024-05-29
Payer: COMMERCIAL

## 2024-06-12 ENCOUNTER — PATIENT OUTREACH (OUTPATIENT)
Dept: CARE COORDINATION | Facility: CLINIC | Age: 89
End: 2024-06-12
Payer: COMMERCIAL

## 2024-08-25 ENCOUNTER — HEALTH MAINTENANCE LETTER (OUTPATIENT)
Age: 89
End: 2024-08-25

## 2024-12-02 NOTE — TELEPHONE ENCOUNTER
Forms received from [employer]./ Revision to Plan of Care # [id number]/ Cert. Period: 7/2/2021-8/30/2021 for Mary Beth Hoyt DO.  Forms placed in provider 'sign me' folder.  Please fax forms to [phone] after completion.    SHAHIDA BLAS (R)  Radiology Department     Next Appt 12/18/24  Last Appt 12/20/23    Refill request for   Date Department Ordering/Authorizing   4/12/2024 [facility] [address], [place]     Outpatient Medication Detail     Disp Refills Start End    tadalafil (CIALIS) 10 MG tablet 90 tablet 1 4/12/2024 --    Sig - Route: Take 1 tablet by mouth daily as needed for Erectile Dysfunction. Max dose 24hr 20 mg - Oral    Sent to pharmacy as: Tadalafil 10 MG Oral Tablet (CIALIS)      Date Department Ordering Authorizing   8/2/2024 [facility] P[address], [place], [place]     Outpatient Medication Detail     Disp Refills Start End    topiramate (TOPAMAX) 50 MG tablet 360 tablet 3 8/2/2024 --    Sig - Route: Take 2 tablets by mouth in the morning and 2 tablets in the evening. - Oral      Date Department [facility]ing Authorizing   [facility] [address] [address], Grace [place]cc[place], [place]     Outpatient Medication Detail     Disp Refills Start End    Valacyclovir HCl 1000 MG Tab 90 tablet 3 3/6/2024 3/1/2025    Sig - Route: Take 1 tablet by mouth daily. - Oral    Sent to pharmacy as: valACYclovir HCl 1 GM Oral Tablet      Refilled per standing med protocol.

## 2024-12-29 ENCOUNTER — HEALTH MAINTENANCE LETTER (OUTPATIENT)
Age: 89
End: 2024-12-29

## 2025-02-14 ENCOUNTER — LAB REQUISITION (OUTPATIENT)
Dept: LAB | Facility: CLINIC | Age: 89
End: 2025-02-14
Payer: COMMERCIAL

## 2025-02-14 DIAGNOSIS — S91.302A UNSPECIFIED OPEN WOUND, LEFT FOOT, INITIAL ENCOUNTER: ICD-10-CM

## 2025-02-14 DIAGNOSIS — I73.9 PERIPHERAL VASCULAR DISEASE, UNSPECIFIED: ICD-10-CM
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
## 2025-02-14 DIAGNOSIS — T14.8XXA OTHER INJURY OF UNSPECIFIED BODY REGION, INITIAL ENCOUNTER: ICD-10-CM

## 2025-02-14 DIAGNOSIS — I10 ESSENTIAL (PRIMARY) HYPERTENSION: ICD-10-CM

## 2025-02-17 LAB
ANION GAP SERPL CALCULATED.3IONS-SCNC: 14 MMOL/L (ref 7–15)
BUN SERPL-MCNC: 42.2 MG/DL (ref 8–23)
CALCIUM SERPL-MCNC: 9.2 MG/DL (ref 8.8–10.4)
CHLORIDE SERPL-SCNC: 103 MMOL/L (ref 98–107)
CREAT SERPL-MCNC: 1.88 MG/DL (ref 0.67–1.17)
EGFRCR SERPLBLD CKD-EPI 2021: 34 ML/MIN/1.73M2
ERYTHROCYTE [DISTWIDTH] IN BLOOD BY AUTOMATED COUNT: 16.2 % (ref 10–15)
EST. AVERAGE GLUCOSE BLD GHB EST-MCNC: 131 MG/DL
GLUCOSE SERPL-MCNC: 115 MG/DL (ref 70–99)
HBA1C MFR BLD: 6.2 %
HCO3 SERPL-SCNC: 22 MMOL/L (ref 22–29)
HCT VFR BLD AUTO: 27.7 % (ref 40–53)
HGB BLD-MCNC: 8.2 G/DL (ref 13.3–17.7)
MCH RBC QN AUTO: 30.3 PG (ref 26.5–33)
MCHC RBC AUTO-ENTMCNC: 29.6 G/DL (ref 31.5–36.5)
MCV RBC AUTO: 102 FL (ref 78–100)
PLATELET # BLD AUTO: 202 10E3/UL (ref 150–450)
POTASSIUM SERPL-SCNC: 4.6 MMOL/L (ref 3.4–5.3)
RBC # BLD AUTO: 2.71 10E6/UL (ref 4.4–5.9)
SODIUM SERPL-SCNC: 139 MMOL/L (ref 135–145)
TSH SERPL DL<=0.005 MIU/L-ACNC: 5.79 UIU/ML (ref 0.3–4.2)
WBC # BLD AUTO: 6.9 10E3/UL (ref 4–11)

## 2025-05-31 ENCOUNTER — HEALTH MAINTENANCE LETTER (OUTPATIENT)
Age: 89
End: 2025-05-31